# Patient Record
Sex: FEMALE | Race: WHITE | Employment: UNEMPLOYED | URBAN - METROPOLITAN AREA
[De-identification: names, ages, dates, MRNs, and addresses within clinical notes are randomized per-mention and may not be internally consistent; named-entity substitution may affect disease eponyms.]

---

## 2018-10-30 ENCOUNTER — HOSPITAL ENCOUNTER (OUTPATIENT)
Dept: LAB | Age: 63
Discharge: HOME | End: 2018-10-30

## 2018-10-30 LAB — MAGNESIUM SERPL-MCNC: 2.3 MG/DL (ref 1.8–2.4)

## 2018-10-30 PROCEDURE — 83735 ASSAY OF MAGNESIUM: CPT

## 2018-12-03 ENCOUNTER — HOSPITAL ENCOUNTER (OUTPATIENT)
Dept: LAB | Age: 63
Discharge: HOME | End: 2018-12-03

## 2018-12-03 PROCEDURE — 83735 ASSAY OF MAGNESIUM: CPT

## 2018-12-03 PROCEDURE — 84443 ASSAY THYROID STIM HORMONE: CPT

## 2018-12-04 LAB
MAGNESIUM SERPL-MCNC: 2.5 MG/DL (ref 1.8–2.4)
TSH SERPL DL<=0.005 MIU/L-ACNC: 0.74 UIU/ML (ref 0.36–3.74)

## 2019-01-21 ENCOUNTER — HOSPITAL ENCOUNTER (OUTPATIENT)
Dept: LAB | Age: 64
Discharge: HOME | End: 2019-01-21

## 2019-01-21 LAB
BASOPHILS # BLD: 0.1 K/UL (ref 0–0.2)
BASOPHILS NFR BLD: 1 % (ref 0–2)
DIFFERENTIAL METHOD BLD: ABNORMAL
EOSINOPHIL # BLD: 0.5 K/UL (ref 0–0.8)
EOSINOPHIL NFR BLD: 6 % (ref 0.5–7.8)
ERYTHROCYTE [DISTWIDTH] IN BLOOD BY AUTOMATED COUNT: 14.3 % (ref 11.9–14.6)
HCT VFR BLD AUTO: 47.9 % (ref 35.8–46.3)
HGB BLD-MCNC: 14.8 G/DL (ref 11.7–15.4)
IMM GRANULOCYTES # BLD AUTO: 0 K/UL (ref 0–0.5)
IMM GRANULOCYTES NFR BLD AUTO: 0 % (ref 0–5)
LYMPHOCYTES # BLD: 2.9 K/UL (ref 0.5–4.6)
LYMPHOCYTES NFR BLD: 31 % (ref 13–44)
MCH RBC QN AUTO: 28.5 PG (ref 26.1–32.9)
MCHC RBC AUTO-ENTMCNC: 30.9 G/DL (ref 31.4–35)
MCV RBC AUTO: 92.3 FL (ref 79.6–97.8)
MONOCYTES # BLD: 0.8 K/UL (ref 0.1–1.3)
MONOCYTES NFR BLD: 8 % (ref 4–12)
NEUTS SEG # BLD: 5.2 K/UL (ref 1.7–8.2)
NEUTS SEG NFR BLD: 55 % (ref 43–78)
NRBC # BLD: 0 K/UL (ref 0–0.2)
PLATELET # BLD AUTO: 245 K/UL (ref 150–450)
PMV BLD AUTO: 9.6 FL (ref 9.4–12.3)
RBC # BLD AUTO: 5.19 M/UL (ref 4.05–5.2)
WBC # BLD AUTO: 9.5 K/UL (ref 4.3–11.1)

## 2019-01-21 PROCEDURE — 85025 COMPLETE CBC W/AUTO DIFF WBC: CPT

## 2019-12-16 ENCOUNTER — HOSPITAL ENCOUNTER (OUTPATIENT)
Dept: LAB | Age: 64
Discharge: HOME | End: 2019-12-16

## 2019-12-16 LAB
BASOPHILS # BLD: 0.1 K/UL (ref 0–0.2)
BASOPHILS NFR BLD: 1 % (ref 0–2)
DIFFERENTIAL METHOD BLD: ABNORMAL
EOSINOPHIL # BLD: 0.5 K/UL (ref 0–0.8)
EOSINOPHIL NFR BLD: 7 % (ref 0.5–7.8)
ERYTHROCYTE [DISTWIDTH] IN BLOOD BY AUTOMATED COUNT: 14.1 % (ref 11.9–14.6)
EST. AVERAGE GLUCOSE BLD GHB EST-MCNC: 120 MG/DL
HBA1C MFR BLD: 5.8 % (ref 4.8–6)
HCT VFR BLD AUTO: 43.1 % (ref 35.8–46.3)
HGB BLD-MCNC: 13.4 G/DL (ref 11.7–15.4)
IMM GRANULOCYTES # BLD AUTO: 0 K/UL (ref 0–0.5)
IMM GRANULOCYTES NFR BLD AUTO: 0 % (ref 0–5)
LYMPHOCYTES # BLD: 2.7 K/UL (ref 0.5–4.6)
LYMPHOCYTES NFR BLD: 36 % (ref 13–44)
MCH RBC QN AUTO: 28 PG (ref 26.1–32.9)
MCHC RBC AUTO-ENTMCNC: 31.1 G/DL (ref 31.4–35)
MCV RBC AUTO: 90 FL (ref 79.6–97.8)
MONOCYTES # BLD: 0.5 K/UL (ref 0.1–1.3)
MONOCYTES NFR BLD: 6 % (ref 4–12)
NEUTS SEG # BLD: 3.7 K/UL (ref 1.7–8.2)
NEUTS SEG NFR BLD: 50 % (ref 43–78)
NRBC # BLD: 0 K/UL (ref 0–0.2)
PLATELET # BLD AUTO: 193 K/UL (ref 150–450)
PMV BLD AUTO: 10.3 FL (ref 9.4–12.3)
RBC # BLD AUTO: 4.79 M/UL (ref 4.05–5.2)
WBC # BLD AUTO: 7.5 K/UL (ref 4.3–11.1)

## 2019-12-16 PROCEDURE — 85025 COMPLETE CBC W/AUTO DIFF WBC: CPT

## 2019-12-16 PROCEDURE — 83036 HEMOGLOBIN GLYCOSYLATED A1C: CPT

## 2022-06-03 ENCOUNTER — APPOINTMENT (OUTPATIENT)
Dept: GENERAL RADIOLOGY | Age: 67
DRG: 689 | End: 2022-06-03
Payer: MEDICARE

## 2022-06-03 ENCOUNTER — HOSPITAL ENCOUNTER (INPATIENT)
Age: 67
LOS: 11 days | Discharge: NURSING, REHAB OR LONG-TERM CARE | DRG: 689 | End: 2022-06-14
Attending: EMERGENCY MEDICINE
Payer: MEDICARE

## 2022-06-03 ENCOUNTER — APPOINTMENT (OUTPATIENT)
Dept: CT IMAGING | Age: 67
DRG: 689 | End: 2022-06-03
Payer: MEDICARE

## 2022-06-03 DIAGNOSIS — R50.9 ACUTE FEBRILE ILLNESS: ICD-10-CM

## 2022-06-03 DIAGNOSIS — R41.0 DELIRIUM: Primary | ICD-10-CM

## 2022-06-03 PROBLEM — N39.0 UTI (URINARY TRACT INFECTION): Status: ACTIVE | Noted: 2022-01-01

## 2022-06-03 PROBLEM — G93.41 ACUTE METABOLIC ENCEPHALOPATHY: Status: ACTIVE | Noted: 2022-06-03

## 2022-06-03 LAB
ALBUMIN SERPL-MCNC: 3 G/DL (ref 3.2–4.6)
ALBUMIN/GLOB SERPL: 0.8 {RATIO} (ref 1.2–3.5)
ALP SERPL-CCNC: 113 U/L (ref 50–136)
ALT SERPL-CCNC: 8 U/L (ref 12–65)
AMORPH CRY URNS QL MICRO: ABNORMAL
ANION GAP SERPL CALC-SCNC: 10 MMOL/L (ref 7–16)
APPEARANCE FLD: CLEAR
APPEARANCE UR: ABNORMAL
AST SERPL-CCNC: 14 U/L (ref 15–37)
B PERT DNA SPEC QL NAA+PROBE: NOT DETECTED
BACTERIA URNS QL MICRO: ABNORMAL /HPF
BASOPHILS # BLD: 0 K/UL (ref 0–0.2)
BASOPHILS NFR BLD: 1 % (ref 0–2)
BILIRUB SERPL-MCNC: 0.5 MG/DL (ref 0.2–1.1)
BILIRUB UR QL: NEGATIVE
BORDETELLA PARAPERTUSSIS BY PCR: NOT DETECTED
BUN SERPL-MCNC: 7 MG/DL (ref 8–23)
C PNEUM DNA SPEC QL NAA+PROBE: NOT DETECTED
CALCIUM SERPL-MCNC: 8.7 MG/DL (ref 8.3–10.4)
CASTS URNS QL MICRO: 0 /LPF
CHLORIDE SERPL-SCNC: 104 MMOL/L (ref 98–107)
CO2 SERPL-SCNC: 24 MMOL/L (ref 21–32)
COLOR FLD: COLORLESS
COLOR UR: YELLOW
CREAT SERPL-MCNC: 0.62 MG/DL (ref 0.6–1)
CRYSTALS URNS QL MICRO: 0 /LPF
DIFFERENTIAL METHOD BLD: ABNORMAL
EKG ATRIAL RATE: 115 BPM
EKG DIAGNOSIS: NORMAL
EKG P AXIS: 59 DEGREES
EKG P-R INTERVAL: 152 MS
EKG Q-T INTERVAL: 324 MS
EKG QRS DURATION: 69 MS
EKG QTC CALCULATION (BAZETT): 449 MS
EKG R AXIS: -20 DEGREES
EKG T AXIS: 47 DEGREES
EKG VENTRICULAR RATE: 115 BPM
EOSINOPHIL # BLD: 0.1 K/UL (ref 0–0.8)
EOSINOPHIL NFR BLD: 1 % (ref 0.5–7.8)
EPI CELLS #/AREA URNS HPF: 0 /HPF
ERYTHROCYTE [DISTWIDTH] IN BLOOD BY AUTOMATED COUNT: 14 % (ref 11.9–14.6)
FLUAV SUBTYP SPEC NAA+PROBE: NOT DETECTED
FLUBV RNA SPEC QL NAA+PROBE: NOT DETECTED
GLOBULIN SER CALC-MCNC: 3.6 G/DL (ref 2.3–3.5)
GLUCOSE BLD STRIP.AUTO-MCNC: 148 MG/DL (ref 65–100)
GLUCOSE CSF-MCNC: 67 MG/DL (ref 40–70)
GLUCOSE SERPL-MCNC: 125 MG/DL (ref 65–100)
GLUCOSE UR STRIP.AUTO-MCNC: NEGATIVE MG/DL
HADV DNA SPEC QL NAA+PROBE: NOT DETECTED
HCOV 229E RNA SPEC QL NAA+PROBE: NOT DETECTED
HCOV HKU1 RNA SPEC QL NAA+PROBE: NOT DETECTED
HCOV NL63 RNA SPEC QL NAA+PROBE: NOT DETECTED
HCOV OC43 RNA SPEC QL NAA+PROBE: NOT DETECTED
HCT VFR BLD AUTO: 40 % (ref 35.8–46.3)
HGB BLD-MCNC: 13 G/DL (ref 11.7–15.4)
HGB UR QL STRIP: ABNORMAL
HMPV RNA SPEC QL NAA+PROBE: NOT DETECTED
HPIV1 RNA SPEC QL NAA+PROBE: NOT DETECTED
HPIV2 RNA SPEC QL NAA+PROBE: NOT DETECTED
HPIV3 RNA SPEC QL NAA+PROBE: NOT DETECTED
HPIV4 RNA SPEC QL NAA+PROBE: NOT DETECTED
IMM GRANULOCYTES # BLD AUTO: 0 K/UL (ref 0–0.5)
IMM GRANULOCYTES NFR BLD AUTO: 1 % (ref 0–5)
KETONES UR QL STRIP.AUTO: ABNORMAL MG/DL
LACTATE SERPL-SCNC: 1.4 MMOL/L (ref 0.4–2)
LEUKOCYTE ESTERASE UR QL STRIP.AUTO: NEGATIVE
LYMPHOCYTES # BLD: 0.3 K/UL (ref 0.5–4.6)
LYMPHOCYTES NFR BLD: 6 % (ref 13–44)
M PNEUMO DNA SPEC QL NAA+PROBE: NOT DETECTED
MAGNESIUM SERPL-MCNC: 2.4 MG/DL (ref 1.8–2.4)
MCH RBC QN AUTO: 28 PG (ref 26.1–32.9)
MCHC RBC AUTO-ENTMCNC: 32.5 G/DL (ref 31.4–35)
MCV RBC AUTO: 86.2 FL (ref 79.6–97.8)
MONOCYTES # BLD: 0.2 K/UL (ref 0.1–1.3)
MONOCYTES NFR BLD: 5 % (ref 4–12)
MUCOUS THREADS URNS QL MICRO: 0 /LPF
NEUTS SEG # BLD: 4.2 K/UL (ref 1.7–8.2)
NEUTS SEG NFR BLD: 86 % (ref 43–78)
NITRITE UR QL STRIP.AUTO: POSITIVE
NRBC # BLD: 0 K/UL (ref 0–0.2)
NUC CELL # FLD: 2 /CU MM
PH UR STRIP: 7.5 [PH] (ref 5–9)
PLATELET # BLD AUTO: 141 K/UL (ref 150–450)
PMV BLD AUTO: 8.9 FL (ref 9.4–12.3)
POTASSIUM SERPL-SCNC: 3.3 MMOL/L (ref 3.5–5.1)
PROCALCITONIN SERPL-MCNC: 0.1 NG/ML (ref 0–0.49)
PROT CSF-MCNC: 80 MG/DL (ref 15–45)
PROT SERPL-MCNC: 6.6 G/DL (ref 6.3–8.2)
PROT UR STRIP-MCNC: 30 MG/DL
RBC # BLD AUTO: 4.64 M/UL (ref 4.05–5.2)
RBC # FLD: 3 /CU MM
RBC #/AREA URNS HPF: ABNORMAL /HPF
RSV RNA SPEC QL NAA+PROBE: NOT DETECTED
RV+EV RNA SPEC QL NAA+PROBE: NOT DETECTED
SARS-COV-2 RNA RESP QL NAA+PROBE: NOT DETECTED
SERVICE CMNT-IMP: ABNORMAL
SODIUM SERPL-SCNC: 138 MMOL/L (ref 136–145)
SP GR UR REFRACTOMETRY: 1.02 (ref 1–1.02)
SPECIMEN SOURCE FLD: NORMAL
T4 SERPL-MCNC: 9.4 UG/DL (ref 4.8–13.9)
TSH W FREE THYROID IF ABNORMAL: 0.87 UIU/ML (ref 0.36–3.74)
TSH, 3RD GENERATION: 0.91 UIU/ML (ref 0.36–3.74)
TUBE # CSF: 1
TUBE # CSF: 1
UROBILINOGEN UR QL STRIP.AUTO: 0.2 EU/DL (ref 0.2–1)
WBC # BLD AUTO: 4.9 K/UL (ref 4.3–11.1)
WBC URNS QL MICRO: ABNORMAL /HPF

## 2022-06-03 PROCEDURE — 99285 EMERGENCY DEPT VISIT HI MDM: CPT

## 2022-06-03 PROCEDURE — 87186 SC STD MICRODIL/AGAR DIL: CPT

## 2022-06-03 PROCEDURE — 84443 ASSAY THYROID STIM HORMONE: CPT

## 2022-06-03 PROCEDURE — 81015 MICROSCOPIC EXAM OF URINE: CPT

## 2022-06-03 PROCEDURE — 009U3ZX DRAINAGE OF SPINAL CANAL, PERCUTANEOUS APPROACH, DIAGNOSTIC: ICD-10-PCS | Performed by: INTERNAL MEDICINE

## 2022-06-03 PROCEDURE — 83735 ASSAY OF MAGNESIUM: CPT

## 2022-06-03 PROCEDURE — 85025 COMPLETE CBC W/AUTO DIFF WBC: CPT

## 2022-06-03 PROCEDURE — 70450 CT HEAD/BRAIN W/O DYE: CPT

## 2022-06-03 PROCEDURE — 6360000002 HC RX W HCPCS: Performed by: EMERGENCY MEDICINE

## 2022-06-03 PROCEDURE — 1100000000 HC RM PRIVATE

## 2022-06-03 PROCEDURE — 87205 SMEAR GRAM STAIN: CPT

## 2022-06-03 PROCEDURE — 71045 X-RAY EXAM CHEST 1 VIEW: CPT

## 2022-06-03 PROCEDURE — 82962 GLUCOSE BLOOD TEST: CPT

## 2022-06-03 PROCEDURE — 2580000003 HC RX 258: Performed by: FAMILY MEDICINE

## 2022-06-03 PROCEDURE — 87088 URINE BACTERIA CULTURE: CPT

## 2022-06-03 PROCEDURE — 6360000002 HC RX W HCPCS: Performed by: FAMILY MEDICINE

## 2022-06-03 PROCEDURE — 84436 ASSAY OF TOTAL THYROXINE: CPT

## 2022-06-03 PROCEDURE — 87040 BLOOD CULTURE FOR BACTERIA: CPT

## 2022-06-03 PROCEDURE — 96361 HYDRATE IV INFUSION ADD-ON: CPT

## 2022-06-03 PROCEDURE — 83605 ASSAY OF LACTIC ACID: CPT

## 2022-06-03 PROCEDURE — 87086 URINE CULTURE/COLONY COUNT: CPT

## 2022-06-03 PROCEDURE — 80053 COMPREHEN METABOLIC PANEL: CPT

## 2022-06-03 PROCEDURE — 82945 GLUCOSE OTHER FLUID: CPT

## 2022-06-03 PROCEDURE — 96365 THER/PROPH/DIAG IV INF INIT: CPT

## 2022-06-03 PROCEDURE — 81003 URINALYSIS AUTO W/O SCOPE: CPT

## 2022-06-03 PROCEDURE — 84145 PROCALCITONIN (PCT): CPT

## 2022-06-03 PROCEDURE — 0202U NFCT DS 22 TRGT SARS-COV-2: CPT

## 2022-06-03 PROCEDURE — 84157 ASSAY OF PROTEIN OTHER: CPT

## 2022-06-03 PROCEDURE — 2580000003 HC RX 258: Performed by: EMERGENCY MEDICINE

## 2022-06-03 PROCEDURE — 89050 BODY FLUID CELL COUNT: CPT

## 2022-06-03 PROCEDURE — 62270 DX LMBR SPI PNXR: CPT

## 2022-06-03 PROCEDURE — 93005 ELECTROCARDIOGRAM TRACING: CPT | Performed by: EMERGENCY MEDICINE

## 2022-06-03 RX ORDER — CARBOXYMETHYLCELLULOSE SODIUM 10 MG/ML
1 GEL OPHTHALMIC EVERY 12 HOURS
Status: DISCONTINUED | OUTPATIENT
Start: 2022-06-03 | End: 2022-06-14 | Stop reason: HOSPADM

## 2022-06-03 RX ORDER — DULOXETIN HYDROCHLORIDE 30 MG/1
120 CAPSULE, DELAYED RELEASE ORAL DAILY
Status: DISCONTINUED | OUTPATIENT
Start: 2022-06-03 | End: 2022-06-14 | Stop reason: HOSPADM

## 2022-06-03 RX ORDER — ACETAMINOPHEN 650 MG/1
650 SUPPOSITORY RECTAL EVERY 6 HOURS PRN
Status: DISCONTINUED | OUTPATIENT
Start: 2022-06-03 | End: 2022-06-14 | Stop reason: HOSPADM

## 2022-06-03 RX ORDER — POLYETHYLENE GLYCOL 3350 17 G/17G
17 POWDER, FOR SOLUTION ORAL DAILY PRN
Status: DISCONTINUED | OUTPATIENT
Start: 2022-06-03 | End: 2022-06-14 | Stop reason: HOSPADM

## 2022-06-03 RX ORDER — SODIUM CHLORIDE, SODIUM LACTATE, POTASSIUM CHLORIDE, AND CALCIUM CHLORIDE .6; .31; .03; .02 G/100ML; G/100ML; G/100ML; G/100ML
30 INJECTION, SOLUTION INTRAVENOUS ONCE
Status: COMPLETED | OUTPATIENT
Start: 2022-06-03 | End: 2022-06-03

## 2022-06-03 RX ORDER — LEVOTHYROXINE SODIUM 0.05 MG/1
25 TABLET ORAL DAILY
Status: DISCONTINUED | OUTPATIENT
Start: 2022-06-03 | End: 2022-06-14 | Stop reason: HOSPADM

## 2022-06-03 RX ORDER — PANTOPRAZOLE SODIUM 40 MG/1
40 TABLET, DELAYED RELEASE ORAL
Status: DISCONTINUED | OUTPATIENT
Start: 2022-06-04 | End: 2022-06-03

## 2022-06-03 RX ORDER — INSULIN LISPRO 100 [IU]/ML
0-8 INJECTION, SOLUTION INTRAVENOUS; SUBCUTANEOUS
Status: DISCONTINUED | OUTPATIENT
Start: 2022-06-03 | End: 2022-06-04

## 2022-06-03 RX ORDER — TRAZODONE HYDROCHLORIDE 50 MG/1
25 TABLET ORAL NIGHTLY
Status: DISCONTINUED | OUTPATIENT
Start: 2022-06-03 | End: 2022-06-14 | Stop reason: HOSPADM

## 2022-06-03 RX ORDER — BUTALBITAL, ACETAMINOPHEN AND CAFFEINE 50; 325; 40 MG/1; MG/1; MG/1
1 TABLET ORAL EVERY 4 HOURS PRN
Status: DISCONTINUED | OUTPATIENT
Start: 2022-06-03 | End: 2022-06-14 | Stop reason: HOSPADM

## 2022-06-03 RX ORDER — ARIPIPRAZOLE 5 MG/1
15 TABLET ORAL DAILY
Status: DISCONTINUED | OUTPATIENT
Start: 2022-06-03 | End: 2022-06-14 | Stop reason: HOSPADM

## 2022-06-03 RX ORDER — SODIUM CHLORIDE 0.9 % (FLUSH) 0.9 %
3 SYRINGE (ML) INJECTION EVERY 8 HOURS
Status: DISCONTINUED | OUTPATIENT
Start: 2022-06-03 | End: 2022-06-14

## 2022-06-03 RX ORDER — SODIUM CHLORIDE 9 MG/ML
INJECTION, SOLUTION INTRAVENOUS PRN
Status: DISCONTINUED | OUTPATIENT
Start: 2022-06-03 | End: 2022-06-14 | Stop reason: HOSPADM

## 2022-06-03 RX ORDER — ACETAMINOPHEN 325 MG/1
650 TABLET ORAL EVERY 6 HOURS PRN
Status: DISCONTINUED | OUTPATIENT
Start: 2022-06-03 | End: 2022-06-14 | Stop reason: HOSPADM

## 2022-06-03 RX ORDER — HYDRALAZINE HYDROCHLORIDE 20 MG/ML
20 INJECTION INTRAMUSCULAR; INTRAVENOUS EVERY 6 HOURS PRN
Status: DISCONTINUED | OUTPATIENT
Start: 2022-06-03 | End: 2022-06-14 | Stop reason: HOSPADM

## 2022-06-03 RX ORDER — KETOROLAC TROMETHAMINE 10 MG/1
10 TABLET, FILM COATED ORAL EVERY 6 HOURS PRN
Status: DISPENSED | OUTPATIENT
Start: 2022-06-03 | End: 2022-06-08

## 2022-06-03 RX ORDER — SODIUM CHLORIDE 0.9 % (FLUSH) 0.9 %
5-40 SYRINGE (ML) INJECTION PRN
Status: DISCONTINUED | OUTPATIENT
Start: 2022-06-03 | End: 2022-06-14 | Stop reason: HOSPADM

## 2022-06-03 RX ORDER — INSULIN LISPRO 100 [IU]/ML
0-4 INJECTION, SOLUTION INTRAVENOUS; SUBCUTANEOUS NIGHTLY
Status: DISCONTINUED | OUTPATIENT
Start: 2022-06-03 | End: 2022-06-04 | Stop reason: SDUPTHER

## 2022-06-03 RX ORDER — DIVALPROEX SODIUM 125 MG/1
250 CAPSULE, COATED PELLETS ORAL EVERY 8 HOURS SCHEDULED
Status: DISCONTINUED | OUTPATIENT
Start: 2022-06-03 | End: 2022-06-14 | Stop reason: HOSPADM

## 2022-06-03 RX ORDER — TOPIRAMATE 50 MG/1
50 TABLET, FILM COATED ORAL DAILY
Status: DISCONTINUED | OUTPATIENT
Start: 2022-06-03 | End: 2022-06-14 | Stop reason: HOSPADM

## 2022-06-03 RX ORDER — POTASSIUM CHLORIDE 20 MEQ/1
40 TABLET, EXTENDED RELEASE ORAL ONCE
Status: COMPLETED | OUTPATIENT
Start: 2022-06-03 | End: 2022-06-06

## 2022-06-03 RX ORDER — HEPARIN SODIUM 5000 [USP'U]/ML
5000 INJECTION, SOLUTION INTRAVENOUS; SUBCUTANEOUS EVERY 8 HOURS SCHEDULED
Status: DISCONTINUED | OUTPATIENT
Start: 2022-06-03 | End: 2022-06-14 | Stop reason: HOSPADM

## 2022-06-03 RX ORDER — SODIUM CHLORIDE 0.9 % (FLUSH) 0.9 %
5-40 SYRINGE (ML) INJECTION EVERY 12 HOURS SCHEDULED
Status: DISCONTINUED | OUTPATIENT
Start: 2022-06-03 | End: 2022-06-14 | Stop reason: HOSPADM

## 2022-06-03 RX ADMIN — CEFTRIAXONE 1000 MG: 1 INJECTION, POWDER, FOR SOLUTION INTRAMUSCULAR; INTRAVENOUS at 22:42

## 2022-06-03 RX ADMIN — PIPERACILLIN AND TAZOBACTAM 4500 MG: 4; .5 INJECTION, POWDER, LYOPHILIZED, FOR SOLUTION INTRAVENOUS at 10:30

## 2022-06-03 RX ADMIN — SODIUM CHLORIDE, PRESERVATIVE FREE 10 ML: 5 INJECTION INTRAVENOUS at 22:28

## 2022-06-03 RX ADMIN — SODIUM CHLORIDE, POTASSIUM CHLORIDE, SODIUM LACTATE AND CALCIUM CHLORIDE 1641 ML: 600; 310; 30; 20 INJECTION, SOLUTION INTRAVENOUS at 11:14

## 2022-06-03 RX ADMIN — HEPARIN SODIUM 5000 UNITS: 5000 INJECTION INTRAVENOUS; SUBCUTANEOUS at 22:47

## 2022-06-03 ASSESSMENT — ENCOUNTER SYMPTOMS: VOMITING: 0

## 2022-06-03 NOTE — PROGRESS NOTES
TRANSFER - IN REPORT:    Verbal report received from ALICIA Whyte on Angel Vivas  being received from ER for routine progression of patient care      Report consisted of patient's Situation, Background, Assessment and   Recommendations(SBAR). Information from the following report(s) Nurse Handoff Report was reviewed with the receiving nurse. Opportunity for questions and clarification was provided. Assessment completed upon patient's arrival to unit and care assumed.

## 2022-06-03 NOTE — ED TRIAGE NOTES
Pt arrives via [facility] from [facility]. Per EMS facility reports pt is altered. At baseline facility reports pt is conversational and able to move all extremities. On arrival pt is nonverbal. Last time facility reports pt was normal was Wednesday.

## 2022-06-03 NOTE — H&P
Hospitalist Admission History and Physical         NAME:            Zeny Guerrero    Age:                61 y.o.    :               1955    MRN:              [id number]    PCP: Chiquis Contreras MD    Consulting MD:    Treatment Team: Attending Provider: Nidia Ash DO; Registered Nurse: Shanika Zamora RN         Chief Complaint   Patient presents with    Altered Mental Status   HPI:    Patient is a 61 y.o. female who presented from nursing facility for cc AMS. History cannot be obtained by the patient but sister is by bedside who states her sisters AMS started this AM by not talking. Usually able to speak without issues. Hx of CVA with residual left sided weakness, bedbound, no teeth but able to eat regular food, CAD, DM type II, PE no longer on anticoagulation, anxiety, hypothyroidism, chronic pain, and parkinsons. Vitals - , BP elevated. Labs- K 3.3. Procal 0.1. Viral panel negative. UA with positive nitrites and 4+ bacteria. WBC 0-3    CT head and chest x ray with no significant acute findings.   Past Medical History:   Diagnosis Date    Arthritis     Back, knees    CAD (coronary artery disease)     Chronic kidney disease     2010 ARF-- no dialysis--  r/t sepsis per pt    Chronic pain     Coagulation defects     Diabetes ([address] x 6 yrs     type2- sqbs avg am- 125-130---- hypo at [de-identified] -80's    GERD (gastroesophageal reflux disease)     controlled with meds    Hypertension x 5 + yrs    controlled with meds    Morbid obesity ([address]     bmi=47    PE [facility]ol[facility]) [facility] ,     Psychiatric disorder     anxiety depression    Stroke [facility]) 2 yrs ago    left sided weakness    Thromboembolus ([address]     Thyroid disease     Unspecified sleep apnea     wears cpap-\" most of time \" per pt-- wears O2 at hs \" sometimes\" per pt            Past Surgical History:   Procedure Laterality Date    ADENOIDECTOMY  as child   [address], [address] [facility] liothyronine (CYTOMEL) 25 MCG tablet Take 25 mcg by mouth daily    Ar Automatic Reconciliation   LORazepam (ATIVAN) 2 MG tablet Take 2 mg by mouth 2 times daily. Ar Automatic Reconciliation   nitroGLYCERIN (NITROSTAT) 0.4 MG SL tablet Place 0.4 mg under the tongue    Ar Automatic Reconciliation   oxyCODONE (OXY-IR) 15 MG immediate release tablet Take 15 mg by mouth every 8 hours as needed. Ar Automatic Reconciliation   pregabalin (LYRICA) 75 MG capsule Take by mouth. Ar Automatic Reconciliation   rosuvastatin (CRESTOR) 40 MG tablet Take 20 mg by mouth    Ar Automatic Reconciliation   tolterodine (DETROL LA) 4 MG extended release capsule Take 4 mg by mouth daily    Ar Automatic Reconciliation   traMADol (ULTRAM) 50 MG tablet Take 50 mg by mouth every 6 hours as needed. Ar Automatic Reconciliation   zolpidem (AMBIEN) 10 MG tablet Take by mouth. Ar Automatic Reconciliation                      Review of Systems    Cannot obtain due to AMS      Objective:         Patient Vitals for the past 24 hrs:   Temp Pulse Resp BP SpO2   06/03/22 1116 -- (!) 111 19 (!) 171/112 97 %   06/03/22 1056 -- (!) 114 20 (!) 162/110 96 %   06/03/22 1053 100.3 °F (37.9 °C) (!) 113 20 -- 95 %   06/03/22 1023 -- (!) 116 24 (!) 152/81 92 %            No intake/output data recorded. No intake/output data recorded.          Data Review:   Recent Results (from the past 24 hour(s))   CBC with Auto Differential    Collection Time: 06/03/22 10:37 AM   Result Value Ref Range    WBC 4.9 4.3 - 11.1 K/uL    RBC 4.64 4.05 - 5.2 M/uL    Hemoglobin 13.0 11.7 - 15.4 g/dL    Hematocrit 40.0 35.8 - 46.3 %    MCV 86.2 79.6 - 97.8 FL    MCH 28.0 26.1 - 32.9 PG    MCHC 32.5 31.4 - 35.0 g/dL    RDW 14.0 11.9 - 14.6 %    Platelets 517 (L) 819 - 450 K/uL    MPV 8.9 (L) 9.4 - 12.3 FL    nRBC 0.00 0.0 - 0.2 K/uL    Differential Type AUTOMATED      Seg Neutrophils 86 (H) 43 - 78 %    Lymphocytes 6 (L) 13 - 44 %    Monocytes 5 4.0 - 12.0 % Eosinophils % 1 0.5 - 7.8 %    Basophils 1 0.0 - 2.0 %    Immature Granulocytes 1 0.0 - 5.0 %    Segs Absolute 4.2 1.7 - 8.2 K/UL    Absolute Lymph # 0.3 (L) 0.5 - 4.6 K/UL    Absolute Mono # 0.2 0.1 - 1.3 K/UL    Absolute Eos # 0.1 0.0 - 0.8 K/UL    Basophils Absolute 0.0 0.0 - 0.2 K/UL    Absolute Immature Granulocyte 0.0 0.0 - 0.5 K/UL   Lactic Acid Now and in 2 Hours    Collection Time: 06/03/22 10:37 AM   Result Value Ref Range    Lactic Acid, Plasma 1.4 0.4 - 2.0 MMOL/L   Procalcitonin    Collection Time: 06/03/22 10:37 AM   Result Value Ref Range    Procalcitonin 0.10 0.00 - 0.49 ng/mL   EKG 12 Lead    Collection Time: 06/03/22 10:43 AM   Result Value Ref Range    Ventricular Rate 115 BPM    Atrial Rate 115 BPM    P-R Interval 152 ms    QRS Duration 69 ms    Q-T Interval 324 ms    QTc Calculation (Bazett) 449 ms    P Axis 59 degrees    R Axis -20 degrees    T Axis 47 degrees    Diagnosis Sinus tachycardia    Urinalysis w rflx microscopic    Collection Time: 06/03/22 10:52 AM   Result Value Ref Range    Color, UA YELLOW      Appearance HAZY      Specific [place], UA 1.020 1.001 - 1.023      pH, Urine 7.5 5.0 - 9.0      Protein, UA 30 (A) NEG mg/dL    Glucose, UA Negative mg/dL    Ketones, Urine TRACE (A) NEG mg/dL    Bilirubin Urine Negative NEG      Blood, Urine TRACE (A) NEG      Urobilinogen, Urine 0.2 0.2 - 1.0 EU/dL    Nitrite, Urine Positive (A) NEG      Leukocyte Esterase, Urine Negative NEG     Urinalysis, Micro    Collection Time: 06/03/22 10:52 AM   Result Value Ref Range    WBC, UA 0-3 0 /hpf    RBC, UA 3-5 0 /hpf    Epithelial Cells UA 0 0 /hpf    BACTERIA, URINE 4+ (H) 0 /hpf    Casts 0 0 /lpf    Crystals 0 0 /LPF    AMORPHOUS CRYSTAL TRACE 0      Mucus, UA 0 0 /lpf   Respiratory Panel, Molecular, with COVID-19 (Restricted: peds pts or suitable admitted adults)    Collection Time: 06/03/22 10:56 AM    Specimen: Nasopharyngeal   Result Value Ref Range    Adenovirus by PCR NOT DETECTED Coronavirus 229E by PCR NOT DETECTED      Coronavirus HKU1 by PCR NOT DETECTED      Coronavirus NL63 by PCR NOT DETECTED      Coronavirus OC43 by PCR NOT DETECTED      SARS-CoV-2, PCR NOT DETECTED      Human Metapneumovirus by PCR NOT DETECTED      Rhinovirus Enterovirus PCR NOT DETECTED      Influenza A by PCR NOT DETECTED      INFLUENZA B PCR NOT DETECTED      Parainfluenza 1 PCR NOT DETECTED      Parainfluenza 2 PCR NOT DETECTED      Parainfluenza 3 PCR NOT DETECTED      Parainfluenza 4 PCR NOT DETECTED      Respiratory Syncytial Virus by PCR NOT DETECTED      Bordetella parapertussis by PCR NOT DETECTED      Bordetella pertussis by PCR NOT DETECTED      Chlamydophila Pneumonia PCR NOT DETECTED      Mycoplasma pneumo by PCR NOT DETECTED     Comprehensive Metabolic Panel    Collection Time: 06/03/22 12:44 PM   Result Value Ref Range    Sodium 138 136 - 145 mmol/L    Potassium 3.3 (L) 3.5 - 5.1 mmol/L    Chloride 104 98 - 107 mmol/L    CO2 24 21 - 32 mmol/L    Anion Gap 10 7 - 16 mmol/L    Glucose 125 (H) 65 - 100 mg/dL    BUN 7 (L) 8 - 23 MG/DL    CREATININE 0.62 0.6 - 1.0 MG/DL    GFR African American >60 >60 ml/min/1.73m2    GFR Non- >60 >60 ml/min/1.73m2    Calcium 8.7 8.3 - 10.4 MG/DL    Total Bilirubin 0.5 0.2 - 1.1 MG/DL    ALT 8 (L) 12 - 65 U/L    AST 14 (L) 15 - 37 U/L    Alk Phosphatase 113 50 - 136 U/L    Total Protein 6.6 6.3 - 8.2 g/dL    Albumin 3.0 (L) 3.2 - 4.6 g/dL    Globulin 3.6 (H) 2.3 - 3.5 g/dL    Albumin/Globulin Ratio 0.8 (L) 1.2 - 3.5     TSH with Reflex    Collection Time: 06/03/22 12:44 PM   Result Value Ref Range    TSH w Free Thyroid if Abnormal 0.87 0.358 - 3.740 UIU/ML            Physical Exam:         General:    Alert, frail appearing, not speaking. Pale    Eyes:    Conjunctivae/corneas clear. Ears:    Normal     Nose:    Nares normal.     Mouth/Throat:    No obvious deficit    Neck:     no JVD.     Back:     deferred    Lungs:     Clear to auscultation bilaterally. Heart:    Sinus tachycardia    Abdomen:     Soft, non-tender. Bowel sounds normal. No masses,  No organomegaly. Extremities:    Chronic 2+ edema to LE bilaterally     Skin:    Skin color, texture, turgor normal. No rashes or lesions    Neurologic:    Not speaking. Very limited ROM in bed. Assessment and Plan         Principal Problem:    Acute metabolic encephalopathy  Active Problems:    UTI (urinary tract infection)    Diabetes mellitus (HCC)    RYAN (obstructive sleep apnea)    Hypokalemia    Stroke ([facility]    HTN (hypertension)  Resolved Problems:    * No resolved hospital problems. *    Metabolic encephalopathy - Likely due to suspected UTI. Order urine culture and blood cultures pending. ER to perform LP but my suspicion is low since she is moving her neck in room without obvious pain. Recheck TSH/T4, ammonia. Ceftriaxone for now. Was given Zosyn in the ER. Holding gabapentin and Zanaflex. Parkinson d/o- Sinemet TID    Hypothyroidism - Recheck TSH/T4, snythroid    Depression - abilify, depakote, trazodone    Chronic headaches - Topamax. DM type II - SS    HTN- does not appear to take any medications for BP control. PRN hydralazine. RYAN- does not wear CPAP     No nursing home papers by bedside. I have reviewed a note from April of this year to obtain her home medications. FULL CODE until we have papers from nursing home. Sister states the patient makes her own medical decisions.      DVT prophylaxis - Heparin    Signed By:    Chikis Serna DO    Meagan 3, 2022

## 2022-06-03 NOTE — ED PROVIDER NOTES
visualization of images, tracings, or specimens: yes    Risk of Complications, Morbidity, and/or Mortality  Presenting problems: moderate  Diagnostic procedures: low  Management options: theresa Connolly is a 61 y.o. female who presents to the Emergency Department with chief complaint of    Chief Complaint   Patient presents with    Altered Mental Status      82-year-old female brought in by EMS from [facility]-care facility. Patient is lethargic cannot give any history whatsoever. Medics state patient has had increasing lethargy and decreased responsiveness. They state and they asked repeatedly when last known well was and they were told 48 hours ago. No known trauma. No fever no vomiting. No MAR with the patient. There is some documentation denies a history of hypertension and diabetes along with coronary artery disease. Mention of a stroke in the past with residual left-sided weakness. Also history of pulmonary embolism. Unknown if patient is on anticoagulants. Also history of thyroid disease. Medics thought patient's left arm is somewhat weaker than the right. The history is provided by the EMS personnel and the nursing home. Altered Mental Status  Presenting symptoms: lethargy    Severity:  Moderate  Most recent episode:  2 days ago  Episode history:  Continuous  Duration:  2 days  Timing:  Constant  Progression:  Worsening  Chronicity:  New  Context: dementia and nursing home resident    Associated symptoms: no fever, no seizures and no vomiting        All other systems reviewed and are negative. Review of Systems   Unable to perform ROS: Dementia   Constitutional: Negative for fever. Gastrointestinal: Negative for vomiting. Neurological: Negative for seizures.        Past Medical History:   Diagnosis Date    Arthritis     Back, knees    CAD (coronary artery disease)     Chronic kidney disease     August 2010 ARF-- no dialysis--  r/t sepsis per pt    Chronic pain     Coagulation defects     Diabetes ([facility] x 6 yrs     type2- sqbs avg am- 125-130---- hypo at [de-identified] -80's    GERD (gastroesophageal reflux disease)     controlled with meds    Hypertension x 5 + yrs    controlled with meds    Morbid obesity ([facility]     bmi=47    PE (pulmonary thromboembolism) ([facility] 2007, 2009    Psychiatric disorder     anxiety depression    Stroke [facility]) 2 yrs ago    left sided weakness    Thromboembolus ([facility]     Thyroid disease     Unspecified sleep apnea     wears cpap-\" most of time \" per pt-- wears O2 at hs \" sometimes\" per pt        Past Surgical History:   Procedure Laterality Date    ADENOIDECTOMY  as child    CHOLECYSTECTOMY, LAPAROSCOPIC      1993    HEENT      myringotomy x 2    HYSTERECTOMY  1992    OTHER SURGICAL HISTORY      Odon teeth    TONSILLECTOMY  as teen        Family History   Problem Relation Age of Onset    Liver Disease Father     Kidney Disease Father     Cancer Mother            Social Connections:     Frequency of Communication with Friends and Family: Not on file    Frequency of Social Gatherings with Friends and Family: Not on file    Attends Islam Services: Not on file    Active Member of [address] [place] [address] [employer] or Organizations: Not on file    Attends Club or Organization Meetings: Not on file    Marital Status: Not on file        Allergies   Allergen Reactions    Acetaminophen Other (See Comments)    Carisoprodol Other (See Comments)    Cephalexin Nausea Only    Cyclobenzaprine Other (See Comments)    Erythromycin Nausea Only    Gabapentin Other (See Comments)    Hydrocodone-Acetaminophen Other (See Comments)    Metoclopramide Other (See Comments)    Penicillins Swelling    Povidone-Iodine Other (See Comments)    Risperidone Other (See Comments)    Sulfa Antibiotics Nausea Only    Codeine Rash    Oxycodone-Acetaminophen Rash        Vitals signs and nursing note reviewed. No data found. Physical Exam  Vitals and nursing note reviewed. Constitutional:       General: She is in acute distress. Appearance: She is ill-appearing. Comments: Lethargic   HENT:      Head: Normocephalic and atraumatic. Right Ear: External ear normal.      Left Ear: External ear normal.      Mouth/Throat:      Mouth: Mucous membranes are moist.      Pharynx: Oropharynx is clear. Eyes:      General: No scleral icterus. Extraocular Movements: Extraocular movements intact. Pupils: Pupils are equal, round, and reactive to light. Cardiovascular:      Rate and Rhythm: Normal rate and regular rhythm. Pulmonary:      Effort: Pulmonary effort is normal.      Breath sounds: Normal breath sounds. Abdominal:      Palpations: Abdomen is soft. Tenderness: There is no abdominal tenderness. Musculoskeletal:         General: No swelling or tenderness. Cervical back: Normal range of motion and neck supple. Right lower leg: No edema. Left lower leg: No edema. Skin:     General: Skin is warm and dry. Neurological:      General: No focal deficit present. Mental Status: She is lethargic. GCS: GCS eye subscore is 3. GCS verbal subscore is 4. GCS motor subscore is 6. Comments: Opens eyes to loud verbal.  Will  equally. Withdraws to stimulation of heels. Somewhat poor muscle tone throughout. No obvious focal weakness at this point. Lumbar Puncture    Date/Time: 6/3/2022 4:17 PM  Performed by: Emani Mccray MD  Authorized by: Cheli Boateng DO     Consent:     Consent obtained:  Written    Consent given by:  Healthcare agent    Risks discussed:  Infection and nerve damage    Alternatives discussed:  No treatment  Pre-procedure details:     Procedure purpose:  Diagnostic  Anesthesia (see MAR for exact dosages):      Anesthesia method:  Local infiltration    Local anesthetic:  Lidocaine 1% WITH epi  Procedure details:     Lumbar space:  L4-L5 interspace    Needle gauge:  20    Needle type:  Spinal needle - Quincke tip    Needle length (in):  3.5    Number of attempts:  2    Fluid appearance:  Clear    Tubes of fluid:  4    Total volume (ml):  6  Post-procedure:     Puncture site:  Adhesive bandage applied    Patient tolerance of procedure: Tolerated well, no immediate complications  Comments:      No specimens obtained. No blood loss. Bedside timeout performed.         Labs Reviewed   CULTURE, BLOOD 1   CULTURE, BLOOD 1   COVID-19, FLU A/B, AND RSV COMBO   CULTURE, URINE   CBC WITH AUTO DIFFERENTIAL   COMPREHENSIVE METABOLIC PANEL   TSH WITH REFLEX   LACTIC ACID   LACTIC ACID   PROCALCITONIN   URINALYSIS   POCT GLUCOSE        XR CHEST PORTABLE    (Results Pending)            Emerson Coma Scale  Eye Opening: Spontaneous  Best Verbal Response: None  Best Motor Response: Obeys commands  Emerson Coma Scale Score: 11           Results Include:    Recent Results (from the past 24 hour(s))   CBC with Auto Differential    Collection Time: 06/03/22 10:37 AM   Result Value Ref Range    WBC 4.9 4.3 - 11.1 K/uL    RBC 4.64 4.05 - 5.2 M/uL    Hemoglobin 13.0 11.7 - 15.4 g/dL    Hematocrit 40.0 35.8 - 46.3 %    MCV 86.2 79.6 - 97.8 FL    MCH 28.0 26.1 - 32.9 PG    MCHC 32.5 31.4 - 35.0 g/dL    RDW 14.0 11.9 - 14.6 %    Platelets 604 (L) 019 - 450 K/uL    MPV 8.9 (L) 9.4 - 12.3 FL    nRBC 0.00 0.0 - 0.2 K/uL    Differential Type AUTOMATED      Seg Neutrophils 86 (H) 43 - 78 %    Lymphocytes 6 (L) 13 - 44 %    Monocytes 5 4.0 - 12.0 %    Eosinophils % 1 0.5 - 7.8 %    Basophils 1 0.0 - 2.0 %    Immature Granulocytes 1 0.0 - 5.0 %    Segs Absolute 4.2 1.7 - 8.2 K/UL    Absolute Lymph # 0.3 (L) 0.5 - 4.6 K/UL    Absolute Mono # 0.2 0.1 - 1.3 K/UL    Absolute Eos # 0.1 0.0 - 0.8 K/UL    Basophils Absolute 0.0 0.0 - 0.2 K/UL    Absolute Immature Granulocyte 0.0 0.0 - 0.5 K/UL   Lactic Acid Now and in 2 Hours    Collection Time: 06/03/22 10:37 AM   Result Value Ref Range    Lactic Acid, Plasma 1.4 0.4 - 2.0 MMOL/L   Procalcitonin Collection Time: 06/03/22 10:37 AM   Result Value Ref Range    Procalcitonin 0.10 0.00 - 0.49 ng/mL   EKG 12 Lead    Collection Time: 06/03/22 10:43 AM   Result Value Ref Range    Ventricular Rate 115 BPM    Atrial Rate 115 BPM    P-R Interval 152 ms    QRS Duration 69 ms    Q-T Interval 324 ms    QTc Calculation (Bazett) 449 ms    P Axis 59 degrees    R Axis -20 degrees    T Axis 47 degrees    Diagnosis Sinus tachycardia    Urinalysis w rflx microscopic    Collection Time: 06/03/22 10:52 AM   Result Value Ref Range    Color, UA YELLOW      Appearance HAZY      Specific [place], UA 1.020 1.001 - 1.023      pH, Urine 7.5 5.0 - 9.0      Protein, UA 30 (A) NEG mg/dL    Glucose, UA Negative mg/dL    Ketones, Urine TRACE (A) NEG mg/dL    Bilirubin Urine Negative NEG      Blood, Urine TRACE (A) NEG      Urobilinogen, Urine 0.2 0.2 - 1.0 EU/dL    Nitrite, Urine Positive (A) NEG      Leukocyte Esterase, Urine Negative NEG     Urinalysis, Micro    Collection Time: 06/03/22 10:52 AM   Result Value Ref Range    WBC, UA 0-3 0 /hpf    RBC, UA 3-5 0 /hpf    Epithelial Cells UA 0 0 /hpf    BACTERIA, URINE 4+ (H) 0 /hpf    Casts 0 0 /lpf    Crystals 0 0 /LPF    AMORPHOUS CRYSTAL TRACE 0      Mucus, UA 0 0 /lpf   Respiratory Panel, Molecular, with COVID-19 (Restricted: peds pts or suitable admitted adults)    Collection Time: 06/03/22 10:56 AM    Specimen: Nasopharyngeal   Result Value Ref Range    Adenovirus by PCR NOT DETECTED      Coronavirus 229E by PCR NOT DETECTED      Coronavirus HKU1 by PCR NOT DETECTED      Coronavirus NL63 by PCR NOT DETECTED      Coronavirus OC43 by PCR NOT DETECTED      SARS-CoV-2, PCR NOT DETECTED      Human Metapneumovirus by PCR NOT DETECTED      Rhinovirus Enterovirus PCR NOT DETECTED      Influenza A by PCR NOT DETECTED      INFLUENZA B PCR NOT DETECTED      Parainfluenza 1 PCR NOT DETECTED      Parainfluenza 2 PCR NOT DETECTED      Parainfluenza 3 PCR NOT DETECTED      Parainfluenza 4 PCR NOT DETECTED      Respiratory Syncytial Virus by PCR NOT DETECTED      Bordetella parapertussis by PCR NOT DETECTED      Bordetella pertussis by PCR NOT DETECTED      Chlamydophila Pneumonia PCR NOT DETECTED      Mycoplasma pneumo by PCR NOT DETECTED     Comprehensive Metabolic Panel    Collection Time: 06/03/22 12:44 PM   Result Value Ref Range    Sodium 138 136 - 145 mmol/L    Potassium 3.3 (L) 3.5 - 5.1 mmol/L    Chloride 104 98 - 107 mmol/L    CO2 24 21 - 32 mmol/L    Anion Gap 10 7 - 16 mmol/L    Glucose 125 (H) 65 - 100 mg/dL    BUN 7 (L) 8 - 23 MG/DL    CREATININE 0.62 0.6 - 1.0 MG/DL    GFR African American >60 >60 ml/min/1.73m2    GFR Non- >60 >60 ml/min/1.73m2    Calcium 8.7 8.3 - 10.4 MG/DL    Total Bilirubin 0.5 0.2 - 1.1 MG/DL    ALT 8 (L) 12 - 65 U/L    AST 14 (L) 15 - 37 U/L    Alk Phosphatase 113 50 - 136 U/L    Total Protein 6.6 6.3 - 8.2 g/dL    Albumin 3.0 (L) 3.2 - 4.6 g/dL    Globulin 3.6 (H) 2.3 - 3.5 g/dL    Albumin/Globulin Ratio 0.8 (L) 1.2 - 3.5     TSH with Reflex    Collection Time: 06/03/22 12:44 PM   Result Value Ref Range    TSH w Free Thyroid if Abnormal 0.87 0.358 - 3.740 UIU/ML     CT HEAD WO CONTRAST    Result Date: 6/3/2022  CT of the head without contrast. CLINICAL INDICATION: Lethargy, altered mental status PROCEDURE: Serial thin section axial images are obtained from the cranial vertex through the skull base without the administration of intravenous contrast. Radiation dose reduction techniques were used for this study. Our CT scanners use one or all of the following: Automated exposure control, adjusted of the mA and/or kV according to patient size, iterative reconstruction COMPARISON: Head CT dated 2/15/2013. FINDINGS: There is no acute intracranial hemorrhage, mass, or mass effect. No abnormal extra-axial fluid collections identified. There is no hydrocephalus. The basilar cisterns are widely patent.  Extensive bilateral patchy white matter hypoattenuation again noted. It is unchanged. No findings present to indicate large, cortical-based territorial infarction. There is a small old right basal ganglia lacunar infarct. No skull fracture or aggressive osseous lesion noted. Thick mucoperiosteal thickening is noted in the right maxillary sinus. The mastoid air cells are clear. 1. No acute intracranial abnormality. 2. Extensive bilateral white matter hypoattenuation most in keeping with chronic microangiopathic disease. 3. Chronic appearing mucoperiosteal thickening in the right maxillary sinus. XR CHEST PORTABLE    Result Date: 6/3/2022  Portable chest x-ray CLINICAL INDICATION: Altered mental status. FINDINGS: Single AP view the chest compared to a similar exam dated 8/17/2006 shows mild subsegmental atelectasis in the right midlung. Otherwise the lung parenchyma is clear. There is no pleural effusion or pneumothorax. The cardiac silhouette and mediastinum are unremarkable. Mild subsegmental atelectasis in the right midlung. Otherwise no acute abnormality. 3:14 PM  Family has arrived. They do state that the patient is much more lethargic than usual.  Sister was able to FaceTime with her last night. Patient gives occasional one-word answers. Patient answers yes when asked that she has had a headache. Sister states that she always has a headache. Sister states situation happened like this before that cleared up after a few days. Perhaps viral syndrome with metabolic encephalopathy. We will asked hospitalist regarding admission. Voice dictation software was used during the making of this note. This software is not perfect and grammatical and other typographical errors may be present. This note has not been completely proofread for errors.      Tayo Vail MD  06/03/22 1037       Tayo Vail MD  06/03/22 9171       Tayo Vail MD  06/03/22 3249

## 2022-06-04 LAB
ANION GAP SERPL CALC-SCNC: 13 MMOL/L (ref 7–16)
BASOPHILS # BLD: 0 K/UL (ref 0–0.2)
BASOPHILS NFR BLD: 0 % (ref 0–2)
BUN SERPL-MCNC: 7 MG/DL (ref 8–23)
CALCIUM SERPL-MCNC: 8.9 MG/DL (ref 8.3–10.4)
CHLORIDE SERPL-SCNC: 99 MMOL/L (ref 98–107)
CO2 SERPL-SCNC: 23 MMOL/L (ref 21–32)
CREAT SERPL-MCNC: 0.5 MG/DL (ref 0.6–1)
DIFFERENTIAL METHOD BLD: ABNORMAL
EOSINOPHIL # BLD: 0 K/UL (ref 0–0.8)
EOSINOPHIL NFR BLD: 0 % (ref 0.5–7.8)
ERYTHROCYTE [DISTWIDTH] IN BLOOD BY AUTOMATED COUNT: 13.3 % (ref 11.9–14.6)
GLUCOSE BLD STRIP.AUTO-MCNC: 108 MG/DL (ref 65–100)
GLUCOSE BLD STRIP.AUTO-MCNC: 119 MG/DL (ref 65–100)
GLUCOSE BLD STRIP.AUTO-MCNC: 127 MG/DL (ref 65–100)
GLUCOSE BLD STRIP.AUTO-MCNC: 132 MG/DL (ref 65–100)
GLUCOSE SERPL-MCNC: 133 MG/DL (ref 65–100)
HCT VFR BLD AUTO: 44.9 % (ref 35.8–46.3)
HGB BLD-MCNC: 14.7 G/DL (ref 11.7–15.4)
IMM GRANULOCYTES # BLD AUTO: 0 K/UL (ref 0–0.5)
IMM GRANULOCYTES NFR BLD AUTO: 0 % (ref 0–5)
LYMPHOCYTES # BLD: 0.7 K/UL (ref 0.5–4.6)
LYMPHOCYTES NFR BLD: 7 % (ref 13–44)
MAGNESIUM SERPL-MCNC: 2.3 MG/DL (ref 1.8–2.4)
MCH RBC QN AUTO: 27.6 PG (ref 26.1–32.9)
MCHC RBC AUTO-ENTMCNC: 32.7 G/DL (ref 31.4–35)
MCV RBC AUTO: 84.4 FL (ref 79.6–97.8)
MONOCYTES # BLD: 0.7 K/UL (ref 0.1–1.3)
MONOCYTES NFR BLD: 8 % (ref 4–12)
NEUTS SEG # BLD: 7.9 K/UL (ref 1.7–8.2)
NEUTS SEG NFR BLD: 85 % (ref 43–78)
NRBC # BLD: 0 K/UL (ref 0–0.2)
PLATELET # BLD AUTO: 142 K/UL (ref 150–450)
PMV BLD AUTO: 10 FL (ref 9.4–12.3)
POTASSIUM SERPL-SCNC: 2.9 MMOL/L (ref 3.5–5.1)
RBC # BLD AUTO: 5.32 M/UL (ref 4.05–5.2)
SERVICE CMNT-IMP: ABNORMAL
SODIUM SERPL-SCNC: 135 MMOL/L (ref 136–145)
WBC # BLD AUTO: 9.3 K/UL (ref 4.3–11.1)

## 2022-06-04 PROCEDURE — 80048 BASIC METABOLIC PNL TOTAL CA: CPT

## 2022-06-04 PROCEDURE — 85025 COMPLETE CBC W/AUTO DIFF WBC: CPT

## 2022-06-04 PROCEDURE — 6370000000 HC RX 637 (ALT 250 FOR IP): Performed by: FAMILY MEDICINE

## 2022-06-04 PROCEDURE — 82962 GLUCOSE BLOOD TEST: CPT

## 2022-06-04 PROCEDURE — 6360000002 HC RX W HCPCS: Performed by: FAMILY MEDICINE

## 2022-06-04 PROCEDURE — 51798 US URINE CAPACITY MEASURE: CPT

## 2022-06-04 PROCEDURE — 83735 ASSAY OF MAGNESIUM: CPT

## 2022-06-04 PROCEDURE — 2500000003 HC RX 250 WO HCPCS: Performed by: HOSPITALIST

## 2022-06-04 PROCEDURE — 36415 COLL VENOUS BLD VENIPUNCTURE: CPT

## 2022-06-04 PROCEDURE — 2580000003 HC RX 258: Performed by: INTERNAL MEDICINE

## 2022-06-04 PROCEDURE — 1100000000 HC RM PRIVATE

## 2022-06-04 PROCEDURE — 2580000003 HC RX 258: Performed by: FAMILY MEDICINE

## 2022-06-04 RX ORDER — DEXTROSE MONOHYDRATE 50 MG/ML
100 INJECTION, SOLUTION INTRAVENOUS PRN
Status: DISCONTINUED | OUTPATIENT
Start: 2022-06-04 | End: 2022-06-14 | Stop reason: HOSPADM

## 2022-06-04 RX ORDER — SODIUM CHLORIDE 9 MG/ML
INJECTION, SOLUTION INTRAVENOUS CONTINUOUS
Status: DISCONTINUED | OUTPATIENT
Start: 2022-06-04 | End: 2022-06-14 | Stop reason: HOSPADM

## 2022-06-04 RX ORDER — ROSUVASTATIN CALCIUM 10 MG/1
20 TABLET, COATED ORAL NIGHTLY
Status: DISCONTINUED | OUTPATIENT
Start: 2022-06-04 | End: 2022-06-14 | Stop reason: HOSPADM

## 2022-06-04 RX ORDER — ASPIRIN 325 MG
325 TABLET ORAL DAILY
Status: DISCONTINUED | OUTPATIENT
Start: 2022-06-04 | End: 2022-06-14 | Stop reason: HOSPADM

## 2022-06-04 RX ORDER — METOPROLOL TARTRATE 5 MG/5ML
5 INJECTION INTRAVENOUS EVERY 6 HOURS PRN
Status: DISCONTINUED | OUTPATIENT
Start: 2022-06-04 | End: 2022-06-14 | Stop reason: HOSPADM

## 2022-06-04 RX ORDER — GLUCAGON 1 MG/ML
1 KIT INJECTION PRN
Status: DISCONTINUED | OUTPATIENT
Start: 2022-06-04 | End: 2022-06-14 | Stop reason: HOSPADM

## 2022-06-04 RX ORDER — INSULIN LISPRO 100 [IU]/ML
0-8 INJECTION, SOLUTION INTRAVENOUS; SUBCUTANEOUS EVERY 6 HOURS
Status: DISCONTINUED | OUTPATIENT
Start: 2022-06-04 | End: 2022-06-08

## 2022-06-04 RX ADMIN — CARBOXYMETHYLCELLULOSE SODIUM 1 DROP: 10 GEL OPHTHALMIC at 21:45

## 2022-06-04 RX ADMIN — CEFTRIAXONE 1000 MG: 1 INJECTION, POWDER, FOR SOLUTION INTRAMUSCULAR; INTRAVENOUS at 17:31

## 2022-06-04 RX ADMIN — HEPARIN SODIUM 5000 UNITS: 5000 INJECTION INTRAVENOUS; SUBCUTANEOUS at 05:43

## 2022-06-04 RX ADMIN — SODIUM CHLORIDE: 900 INJECTION, SOLUTION INTRAVENOUS at 16:00

## 2022-06-04 RX ADMIN — CARBOXYMETHYLCELLULOSE SODIUM 1 DROP: 10 GEL OPHTHALMIC at 01:13

## 2022-06-04 RX ADMIN — SODIUM CHLORIDE, PRESERVATIVE FREE 10 ML: 5 INJECTION INTRAVENOUS at 21:49

## 2022-06-04 RX ADMIN — HEPARIN SODIUM 5000 UNITS: 5000 INJECTION INTRAVENOUS; SUBCUTANEOUS at 16:16

## 2022-06-04 RX ADMIN — METOPROLOL TARTRATE 5 MG: 5 INJECTION INTRAVENOUS at 23:55

## 2022-06-04 RX ADMIN — HEPARIN SODIUM 5000 UNITS: 5000 INJECTION INTRAVENOUS; SUBCUTANEOUS at 21:44

## 2022-06-04 ASSESSMENT — PAIN SCALES - WONG BAKER
WONGBAKER_NUMERICALRESPONSE: 0
WONGBAKER_NUMERICALRESPONSE: 0

## 2022-06-04 NOTE — PROGRESS NOTES
Hospitalist Progress Note   Admit Date:  6/3/2022 10:16 AM   Name:  Tasia Palmer   Age:  61 y.o. Sex:  female  :  1955   MRN:  [id number]   Room:  [facility]/    Presenting Complaint: Altered Mental Status     Reason(s) for Admission: Delirium [R41.0]  Acute febrile illness [Y42.7]  Acute metabolic encephalopathy [G76.27]     Hospital Course & Interval History:     Patient with past medical history of  Previous CVA with residual left-sided weakness  Bed-bound  Patient has no teeth but able to eat regular food. CAD  Type 2 diabetes mellitus   history of pulmonary embolism, not on anticoagulation now. Anxiety  Hypothyroidism  Chronic pain  Parkinson disease    Patient was brought to hospital from nursing facility due to change in mental status. Normally patient could speak without issues. No detailed history is available. Patient was found to have elevated blood pressure, with heart rate of 111/min. Potassium was low, procalcitonin was low. Viral panel was negative. UA is positive for nitrites and 4+ bacteria, no white blood cells however. CT head and chest x-ray showed no significant acute findings. Patient was admitted due to change in mental status and for treatment of UTI. Subjective/24hr Events (22):    22   Patient is lying in bed and opens eyes when she is called and stimulated. She does not answer questions. No fever. No shaking. No chills. No report of shortness of breath. No report of chest pain. No diarrhea. No blood per rectum. No blood per urine. Assessment & Plan:     Principal Problem:    Acute metabolic encephalopathy    UTI (urinary tract infection)  Patient is on empiric ceftriaxone 1 g every 24 hours. We will follow-up on culture result  We will monitor the patient's mentation, and response to treatment. Active Problems:    Diabetes mellitus ([address]  Monitor blood sugar. Cover with insulin sliding scale accordingly.         RYAN (obstructive sleep apnea)  Noted       Hypokalemia  Potassium supplementation      Stroke [facility])  Previous stroke  Continue with aspirin, rosuvastatin. HTN (hypertension)  Blood pressure is elevated sometimes. Will monitor closely. May need to start antihypertensive medication gingerly. Discharge Planning:    to be determined     Diet:  Diet NPO  DVT PPx: heparin SC   Code status: Full Code    Hospital Problems:  Principal Problem:    Acute metabolic encephalopathy  Active Problems:    UTI (urinary tract infection)    Diabetes mellitus (HCC)    RYAN (obstructive sleep apnea)    Hypokalemia    Stroke ([facility] [address]    HTN (hypertension)  Resolved Problems:    * No resolved hospital problems. *      Objective:     Patient Vitals for the past 24 hrs:   Temp Pulse Resp BP SpO2   06/04/22 0818 98.1 °F (36.7 °C) (!) 105 18 (!) 148/102 96 %   06/04/22 0338 97.9 °F (36.6 °C) (!) 110 12 (!) 141/104 95 %   06/03/22 2247 -- (!) 115 -- (!) 150/113 --   06/03/22 2243 98.2 °F (36.8 °C) (!) 121 -- (!) 140/111 95 %   06/03/22 1849 98.2 °F (36.8 °C) (!) 118 18 (!) 151/106 94 %   06/03/22 1516 -- (!) 108 -- -- --   06/03/22 1446 -- -- 20 -- --   06/03/22 1116 -- (!) 111 19 (!) 171/112 97 %   06/03/22 1056 -- (!) 114 20 (!) 162/110 96 %   06/03/22 1053 100.3 °F (37.9 °C) (!) 113 20 -- 95 %       Oxygen Therapy  SpO2: 96 %  O2 Device: None (Room air)  O2 Flow Rate (L/min): 4 L/min    Estimated body mass index is 25.92 kg/m² as calculated from the following:    Height as of this encounter: 5' 4\" (1.626 m). Weight as of this encounter: 151 lb (68.5 kg). Intake/Output Summary (Last 24 hours) at 6/4/2022 1049  Last data filed at 6/4/2022 0339  Gross per 24 hour   Intake 96 ml   Output 101 ml   Net -5 ml         Physical Exam:     Blood pressure (!) 148/102, pulse (!) 105, temperature 98.1 °F (36.7 °C), temperature source Oral, resp. rate 18, height 5' 4\" (1.626 m), weight 151 lb (68.5 kg), SpO2 96 %. General:    Well nourished. Patient is lying flat quietly in bed. She opens eyes when she is called. She does not answer questions. She does not answer her name when she was asked about it. Head:  Normocephalic, atraumatic  Eyes:  Sclerae appear normal.  Pupils equally round. ENT:  Nares appear normal, no drainage. Moist oral mucosa  Neck:  No restricted ROM. Trachea midline   CV:   RRR. No m/r/g. No jugular venous distension. Lungs:   CTAB. No wheezing, rhonchi, or rales. Respirations even, unlabored  Abdomen: Bowel sounds present. Soft, nontender, nondistended. Extremities: No cyanosis or clubbing. No edema  Skin:     No rashes and normal coloration. Warm and dry. Neuro:  As mentioned above, patient responded minimally to verbal stimuli. Does not seem to have localized weakness at this time. No spasticity of her extremities.        I have reviewed ordered lab tests and independently visualized imaging below:    Recent Labs:  Recent Results (from the past 48 hour(s))   CBC with Auto Differential    Collection Time: 06/03/22 10:37 AM   Result Value Ref Range    WBC 4.9 4.3 - 11.1 K/uL    RBC 4.64 4.05 - 5.2 M/uL    Hemoglobin 13.0 11.7 - 15.4 g/dL    Hematocrit 40.0 35.8 - 46.3 %    MCV 86.2 79.6 - 97.8 FL    MCH 28.0 26.1 - 32.9 PG    MCHC 32.5 31.4 - 35.0 g/dL    RDW 14.0 11.9 - 14.6 %    Platelets 509 (L) 348 - 450 K/uL    MPV 8.9 (L) 9.4 - 12.3 FL    nRBC 0.00 0.0 - 0.2 K/uL    Differential Type AUTOMATED      Seg Neutrophils 86 (H) 43 - 78 %    Lymphocytes 6 (L) 13 - 44 %    Monocytes 5 4.0 - 12.0 %    Eosinophils % 1 0.5 - 7.8 %    Basophils 1 0.0 - 2.0 %    Immature Granulocytes 1 0.0 - 5.0 %    Segs Absolute 4.2 1.7 - 8.2 K/UL    Absolute Lymph # 0.3 (L) 0.5 - 4.6 K/UL    Absolute Mono # 0.2 0.1 - 1.3 K/UL    Absolute Eos # 0.1 0.0 - 0.8 K/UL    Basophils Absolute 0.0 0.0 - 0.2 K/UL    Absolute Immature Granulocyte 0.0 0.0 - 0.5 K/UL   Lactic Acid Now and in 2 Hours    Collection Time: 06/03/22 10:37 AM   Result 0 0 /lpf   Respiratory Panel, Molecular, with COVID-19 (Restricted: peds pts or suitable admitted adults)    Collection Time: 06/03/22 10:56 AM    Specimen: Nasopharyngeal   Result Value Ref Range    Adenovirus by PCR NOT DETECTED      Coronavirus 229E by PCR NOT DETECTED      Coronavirus HKU1 by PCR NOT DETECTED      Coronavirus NL63 by PCR NOT DETECTED      Coronavirus OC43 by PCR NOT DETECTED      SARS-CoV-2, PCR NOT DETECTED      Human Metapneumovirus by PCR NOT DETECTED      Rhinovirus Enterovirus PCR NOT DETECTED      Influenza A by PCR NOT DETECTED      INFLUENZA B PCR NOT DETECTED      Parainfluenza 1 PCR NOT DETECTED      Parainfluenza 2 PCR NOT DETECTED      Parainfluenza 3 PCR NOT DETECTED      Parainfluenza 4 PCR NOT DETECTED      Respiratory Syncytial Virus by PCR NOT DETECTED      Bordetella parapertussis by PCR NOT DETECTED      Bordetella pertussis by PCR NOT DETECTED      Chlamydophila Pneumonia PCR NOT DETECTED      Mycoplasma pneumo by PCR NOT DETECTED     Culture, Blood 1    Collection Time: 06/03/22 12:44 PM    Specimen: Blood   Result Value Ref Range    Special Requests LEFT  HAND        Culture NO GROWTH AFTER 15 HOURS     Comprehensive Metabolic Panel    Collection Time: 06/03/22 12:44 PM   Result Value Ref Range    Sodium 138 136 - 145 mmol/L    Potassium 3.3 (L) 3.5 - 5.1 mmol/L    Chloride 104 98 - 107 mmol/L    CO2 24 21 - 32 mmol/L    Anion Gap 10 7 - 16 mmol/L    Glucose 125 (H) 65 - 100 mg/dL    BUN 7 (L) 8 - 23 MG/DL    CREATININE 0.62 0.6 - 1.0 MG/DL    GFR African American >60 >60 ml/min/1.73m2    GFR Non- >60 >60 ml/min/1.73m2    Calcium 8.7 8.3 - 10.4 MG/DL    Total Bilirubin 0.5 0.2 - 1.1 MG/DL    ALT 8 (L) 12 - 65 U/L    AST 14 (L) 15 - 37 U/L    Alk Phosphatase 113 50 - 136 U/L    Total Protein 6.6 6.3 - 8.2 g/dL    Albumin 3.0 (L) 3.2 - 4.6 g/dL    Globulin 3.6 (H) 2.3 - 3.5 g/dL    Albumin/Globulin Ratio 0.8 (L) 1.2 - 3.5     TSH with Reflex    Collection Time: 06/03/22 12:44 PM   Result Value Ref Range    TSH w Free Thyroid if Abnormal 0.87 0.358 - 3.740 UIU/ML   Magnesium    Collection Time: 06/03/22 12:44 PM   Result Value Ref Range    Magnesium 2.4 1.8 - 2.4 mg/dL   Glucose CSF    Collection Time: 06/03/22  4:23 PM   Result Value Ref Range    TUBE NUMBER 1      Glucose, CSF 67 40 - 70 MG/DL   Protein CSF    Collection Time: 06/03/22  4:23 PM   Result Value Ref Range    TUBE NUMBER 1      Protein, CSF 80 (H) 15 - 45 MG/DL   Culture, CSF    Collection Time: 06/03/22  4:23 PM    Specimen: CSF   Result Value Ref Range    Special Requests NO SPECIAL REQUESTS      Gram stain NO DEFINITE ORGANISM SEEN      Gram stain 0 wbc seen per hpf     Culture        No growth after short period of incubation. Further results to follow after overnight incubation.    Body fluid cell count    Collection Time: 06/03/22  4:23 PM   Result Value Ref Range    Specimen CEREBROSPINAL FLUID      Color, Fluid COLORLESS     Appearance, Fluid CLEAR      RBC, Fluid 3 /cu mm    WBC, Fluid 2 /cu mm   POCT Glucose    Collection Time: 06/03/22 10:43 PM   Result Value Ref Range    POC Glucose 148 (H) 65 - 100 mg/dL    Performed by: Aydin Rhodes    Basic Metabolic Panel w/ Reflex to MG    Collection Time: 06/04/22  4:28 AM   Result Value Ref Range    Sodium 135 (L) 136 - 145 mmol/L    Potassium 2.9 (L) 3.5 - 5.1 mmol/L    Chloride 99 98 - 107 mmol/L    CO2 23 21 - 32 mmol/L    Anion Gap 13 7 - 16 mmol/L    Glucose 133 (H) 65 - 100 mg/dL    BUN 7 (L) 8 - 23 MG/DL    CREATININE 0.50 (L) 0.6 - 1.0 MG/DL    GFR African American >60 >60 ml/min/1.73m2    GFR Non- >60 >60 ml/min/1.73m2    Calcium 8.9 8.3 - 10.4 MG/DL   CBC with Auto Differential    Collection Time: 06/04/22  4:28 AM   Result Value Ref Range    WBC 9.3 4.3 - 11.1 K/uL    RBC 5.32 (H) 4.05 - 5.2 M/uL    Hemoglobin 14.7 11.7 - 15.4 g/dL    Hematocrit 44.9 35.8 - 46.3 %    MCV 84.4 79.6 - 97.8 FL    MCH 27.6 26.1 - 32.9 PG    MCHC 32.7 31.4 - 35.0 g/dL    RDW 13.3 11.9 - 14.6 %    Platelets 303 (L) 190 - 450 K/uL    MPV 10.0 9.4 - 12.3 FL    nRBC 0.00 0.0 - 0.2 K/uL    Differential Type AUTOMATED      Seg Neutrophils 85 (H) 43 - 78 %    Lymphocytes 7 (L) 13 - 44 %    Monocytes 8 4.0 - 12.0 %    Eosinophils % 0 (L) 0.5 - 7.8 %    Basophils 0 0.0 - 2.0 %    Immature Granulocytes 0 0.0 - 5.0 %    Segs Absolute 7.9 1.7 - 8.2 K/UL    Absolute Lymph # 0.7 0.5 - 4.6 K/UL    Absolute Mono # 0.7 0.1 - 1.3 K/UL    Absolute Eos # 0.0 0.0 - 0.8 K/UL    Basophils Absolute 0.0 0.0 - 0.2 K/UL    Absolute Immature Granulocyte 0.0 0.0 - 0.5 K/UL   Magnesium    Collection Time: 06/04/22  4:28 AM   Result Value Ref Range    Magnesium 2.3 1.8 - 2.4 mg/dL   POCT Glucose    Collection Time: 06/04/22  8:20 AM   Result Value Ref Range    POC Glucose 132 (H) 65 - 100 mg/dL    Performed by: Nereida        Other Studies:  CT HEAD WO CONTRAST    Result Date: 6/3/2022  CT of the head without contrast. CLINICAL INDICATION: Lethargy, altered mental status PROCEDURE: Serial thin section axial images are obtained from the cranial vertex through the skull base without the administration of intravenous contrast. Radiation dose reduction techniques were used for this study. Our CT scanners use one or all of the following: Automated exposure control, adjusted of the mA and/or kV according to patient size, iterative reconstruction COMPARISON: Head CT dated 2/15/2013. FINDINGS: There is no acute intracranial hemorrhage, mass, or mass effect. No abnormal extra-axial fluid collections identified. There is no hydrocephalus. The basilar cisterns are widely patent. Extensive bilateral patchy white matter hypoattenuation again noted. It is unchanged. No findings present to indicate large, cortical-based territorial infarction. There is a small old right basal ganglia lacunar infarct. No skull fracture or aggressive osseous lesion noted.  Thick mucoperiosteal thickening is noted in the right maxillary sinus. The mastoid air cells are clear. 1. No acute intracranial abnormality. 2. Extensive bilateral white matter hypoattenuation most in keeping with chronic microangiopathic disease. 3. Chronic appearing mucoperiosteal thickening in the right maxillary sinus. XR CHEST PORTABLE    Result Date: 6/3/2022  Portable chest x-ray CLINICAL INDICATION: Altered mental status. FINDINGS: Single AP view the chest compared to a similar exam dated 8/17/2006 shows mild subsegmental atelectasis in the right midlung. Otherwise the lung parenchyma is clear. There is no pleural effusion or pneumothorax. The cardiac silhouette and mediastinum are unremarkable. Mild subsegmental atelectasis in the right midlung. Otherwise no acute abnormality.       Current Meds:  Current Facility-Administered Medications   Medication Dose Route Frequency    sodium chloride flush 0.9 % injection 3 mL  3 mL IntraVENous Q8H    DULoxetine (CYMBALTA) extended release capsule 120 mg  120 mg Oral Daily    insulin lispro (HUMALOG) injection vial 0-8 Units  0-8 Units SubCUTAneous TID WC    insulin lispro (HUMALOG) injection vial 0-4 Units  0-4 Units SubCUTAneous Nightly    levothyroxine (SYNTHROID) tablet 25 mcg  25 mcg Oral Daily    carbidopa-levodopa (SINEMET)  MG per tablet 2 tablet  2 tablet Oral TID    carboxymethylcellulose (THERATEARS) 1 % ophthalmic gel 1 drop  1 drop Both Eyes Q12H    ARIPiprazole (ABILIFY) tablet 15 mg  15 mg Oral Daily    topiramate (TOPAMAX) tablet 50 mg  50 mg Oral Daily    divalproex (DEPAKOTE SPRINKLE) capsule 250 mg  250 mg Oral 3 times per day    traZODone (DESYREL) tablet 25 mg  25 mg Oral Nightly    ketorolac (TORADOL) tablet 10 mg  10 mg Oral Q6H PRN    sodium chloride flush 0.9 % injection 5-40 mL  5-40 mL IntraVENous PRN    cefTRIAXone (ROCEPHIN) 1000 mg IVPB in NS 50ml minibag  1,000 mg IntraVENous Q24H    sodium chloride flush 0.9 % injection 5-40 mL  5-40 mL IntraVENous 2 times per day    sodium chloride flush 0.9 % injection 5-40 mL  5-40 mL IntraVENous PRN    0.9 % sodium chloride infusion   IntraVENous PRN    polyethylene glycol (GLYCOLAX) packet 17 g  17 g Oral Daily PRN    acetaminophen (TYLENOL) tablet 650 mg  650 mg Oral Q6H PRN    Or    acetaminophen (TYLENOL) suppository 650 mg  650 mg Rectal Q6H PRN    heparin (porcine) injection 5,000 Units  5,000 Units SubCUTAneous 3 times per day    potassium chloride (KLOR-CON M) extended release tablet 40 mEq  40 mEq Oral Once    hydrALAZINE (APRESOLINE) injection 20 mg  20 mg IntraVENous Q6H PRN    butalbital-acetaminophen-caffeine (FIORICET, ESGIC) per tablet 1 tablet  1 tablet Oral Q4H PRN    tuberculin injection 5 Units  5 Units IntraDERmal Once    pantoprazole (PROTONIX) 40 mg in sodium chloride (PF) 10 mL injection  40 mg IntraVENous Daily       Signed:  Gorge Lundberg MD    Part of this note may have been written by using a voice dictation software. The note has been proof read but may still contain some grammatical/other typographical errors.

## 2022-06-04 NOTE — PROGRESS NOTES
Attempted to see pt this am, however, pt not appropriate for PO on this date     Ja Pastrana MA/ISI/SLP

## 2022-06-05 ENCOUNTER — APPOINTMENT (OUTPATIENT)
Dept: GENERAL RADIOLOGY | Age: 67
DRG: 689 | End: 2022-06-05
Payer: MEDICARE

## 2022-06-05 LAB
ALBUMIN SERPL-MCNC: 3 G/DL (ref 3.2–4.6)
ALBUMIN/GLOB SERPL: 0.8 {RATIO} (ref 1.2–3.5)
ALP SERPL-CCNC: 111 U/L (ref 50–136)
ALT SERPL-CCNC: 11 U/L (ref 12–65)
ANION GAP SERPL CALC-SCNC: 11 MMOL/L (ref 7–16)
AST SERPL-CCNC: 15 U/L (ref 15–37)
BASOPHILS # BLD: 0 K/UL (ref 0–0.2)
BASOPHILS NFR BLD: 0 % (ref 0–2)
BILIRUB SERPL-MCNC: 0.3 MG/DL (ref 0.2–1.1)
BUN SERPL-MCNC: 15 MG/DL (ref 8–23)
CALCIUM SERPL-MCNC: 8.9 MG/DL (ref 8.3–10.4)
CHLORIDE SERPL-SCNC: 103 MMOL/L (ref 98–107)
CO2 SERPL-SCNC: 21 MMOL/L (ref 21–32)
CREAT SERPL-MCNC: 0.5 MG/DL (ref 0.6–1)
DIFFERENTIAL METHOD BLD: ABNORMAL
EOSINOPHIL # BLD: 0 K/UL (ref 0–0.8)
EOSINOPHIL NFR BLD: 0 % (ref 0.5–7.8)
ERYTHROCYTE [DISTWIDTH] IN BLOOD BY AUTOMATED COUNT: 13.6 % (ref 11.9–14.6)
GLOBULIN SER CALC-MCNC: 3.9 G/DL (ref 2.3–3.5)
GLUCOSE BLD STRIP.AUTO-MCNC: 109 MG/DL (ref 65–100)
GLUCOSE BLD STRIP.AUTO-MCNC: 110 MG/DL (ref 65–100)
GLUCOSE BLD STRIP.AUTO-MCNC: 114 MG/DL (ref 65–100)
GLUCOSE BLD STRIP.AUTO-MCNC: 125 MG/DL (ref 65–100)
GLUCOSE SERPL-MCNC: 127 MG/DL (ref 65–100)
HCT VFR BLD AUTO: 42.2 % (ref 35.8–46.3)
HGB BLD-MCNC: 14 G/DL (ref 11.7–15.4)
IMM GRANULOCYTES # BLD AUTO: 0.1 K/UL (ref 0–0.5)
IMM GRANULOCYTES NFR BLD AUTO: 1 % (ref 0–5)
LYMPHOCYTES # BLD: 1 K/UL (ref 0.5–4.6)
LYMPHOCYTES NFR BLD: 10 % (ref 13–44)
MCH RBC QN AUTO: 27.6 PG (ref 26.1–32.9)
MCHC RBC AUTO-ENTMCNC: 33.2 G/DL (ref 31.4–35)
MCV RBC AUTO: 83.2 FL (ref 79.6–97.8)
MONOCYTES # BLD: 1 K/UL (ref 0.1–1.3)
MONOCYTES NFR BLD: 10 % (ref 4–12)
NEUTS SEG # BLD: 8 K/UL (ref 1.7–8.2)
NEUTS SEG NFR BLD: 79 % (ref 43–78)
NRBC # BLD: 0 K/UL (ref 0–0.2)
PLATELET # BLD AUTO: 140 K/UL (ref 150–450)
PMV BLD AUTO: 10.2 FL (ref 9.4–12.3)
POTASSIUM SERPL-SCNC: 3.1 MMOL/L (ref 3.5–5.1)
PROT SERPL-MCNC: 6.9 G/DL (ref 6.3–8.2)
RBC # BLD AUTO: 5.07 M/UL (ref 4.05–5.2)
SERVICE CMNT-IMP: ABNORMAL
SODIUM SERPL-SCNC: 135 MMOL/L (ref 136–145)
WBC # BLD AUTO: 10.1 K/UL (ref 4.3–11.1)

## 2022-06-05 PROCEDURE — 94760 N-INVAS EAR/PLS OXIMETRY 1: CPT

## 2022-06-05 PROCEDURE — C9113 INJ PANTOPRAZOLE SODIUM, VIA: HCPCS | Performed by: HOSPITALIST

## 2022-06-05 PROCEDURE — 2500000003 HC RX 250 WO HCPCS: Performed by: HOSPITALIST

## 2022-06-05 PROCEDURE — 6370000000 HC RX 637 (ALT 250 FOR IP): Performed by: FAMILY MEDICINE

## 2022-06-05 PROCEDURE — 92610 EVALUATE SWALLOWING FUNCTION: CPT

## 2022-06-05 PROCEDURE — 2700000000 HC OXYGEN THERAPY PER DAY

## 2022-06-05 PROCEDURE — 1100000000 HC RM PRIVATE

## 2022-06-05 PROCEDURE — 51702 INSERT TEMP BLADDER CATH: CPT

## 2022-06-05 PROCEDURE — 2580000003 HC RX 258: Performed by: EMERGENCY MEDICINE

## 2022-06-05 PROCEDURE — 85025 COMPLETE CBC W/AUTO DIFF WBC: CPT

## 2022-06-05 PROCEDURE — 2580000003 HC RX 258: Performed by: INTERNAL MEDICINE

## 2022-06-05 PROCEDURE — 2580000003 HC RX 258: Performed by: FAMILY MEDICINE

## 2022-06-05 PROCEDURE — 36415 COLL VENOUS BLD VENIPUNCTURE: CPT

## 2022-06-05 PROCEDURE — 82962 GLUCOSE BLOOD TEST: CPT

## 2022-06-05 PROCEDURE — 80053 COMPREHEN METABOLIC PANEL: CPT

## 2022-06-05 PROCEDURE — A4216 STERILE WATER/SALINE, 10 ML: HCPCS | Performed by: HOSPITALIST

## 2022-06-05 PROCEDURE — 51798 US URINE CAPACITY MEASURE: CPT

## 2022-06-05 PROCEDURE — 6370000000 HC RX 637 (ALT 250 FOR IP): Performed by: INTERNAL MEDICINE

## 2022-06-05 PROCEDURE — 6360000002 HC RX W HCPCS: Performed by: FAMILY MEDICINE

## 2022-06-05 PROCEDURE — 2580000003 HC RX 258: Performed by: HOSPITALIST

## 2022-06-05 PROCEDURE — 6360000002 HC RX W HCPCS: Performed by: HOSPITALIST

## 2022-06-05 PROCEDURE — 71045 X-RAY EXAM CHEST 1 VIEW: CPT

## 2022-06-05 RX ADMIN — DIVALPROEX SODIUM 250 MG: 125 CAPSULE ORAL at 09:57

## 2022-06-05 RX ADMIN — HEPARIN SODIUM 5000 UNITS: 5000 INJECTION INTRAVENOUS; SUBCUTANEOUS at 22:25

## 2022-06-05 RX ADMIN — DIVALPROEX SODIUM 250 MG: 125 CAPSULE ORAL at 22:20

## 2022-06-05 RX ADMIN — METOPROLOL TARTRATE 5 MG: 5 INJECTION INTRAVENOUS at 06:31

## 2022-06-05 RX ADMIN — CARBOXYMETHYLCELLULOSE SODIUM 1 DROP: 10 GEL OPHTHALMIC at 10:12

## 2022-06-05 RX ADMIN — DULOXETINE HYDROCHLORIDE 120 MG: 30 CAPSULE, DELAYED RELEASE ORAL at 09:58

## 2022-06-05 RX ADMIN — CARBIDOPA AND LEVODOPA 2 TABLET: 25; 100 TABLET ORAL at 22:24

## 2022-06-05 RX ADMIN — CEFTRIAXONE 1000 MG: 1 INJECTION, POWDER, FOR SOLUTION INTRAMUSCULAR; INTRAVENOUS at 17:09

## 2022-06-05 RX ADMIN — HYDRALAZINE HYDROCHLORIDE 20 MG: 20 INJECTION INTRAMUSCULAR; INTRAVENOUS at 03:02

## 2022-06-05 RX ADMIN — TOPIRAMATE 50 MG: 50 TABLET ORAL at 09:58

## 2022-06-05 RX ADMIN — HEPARIN SODIUM 5000 UNITS: 5000 INJECTION INTRAVENOUS; SUBCUTANEOUS at 13:47

## 2022-06-05 RX ADMIN — SODIUM CHLORIDE, PRESERVATIVE FREE 40 MG: 5 INJECTION INTRAVENOUS at 09:58

## 2022-06-05 RX ADMIN — TRAZODONE HYDROCHLORIDE 25 MG: 50 TABLET ORAL at 22:20

## 2022-06-05 RX ADMIN — ARIPIPRAZOLE 15 MG: 5 TABLET ORAL at 09:57

## 2022-06-05 RX ADMIN — LEVOTHYROXINE SODIUM 25 MCG: 0.05 TABLET ORAL at 09:58

## 2022-06-05 RX ADMIN — HEPARIN SODIUM 5000 UNITS: 5000 INJECTION INTRAVENOUS; SUBCUTANEOUS at 06:24

## 2022-06-05 RX ADMIN — CARBOXYMETHYLCELLULOSE SODIUM 1 DROP: 10 GEL OPHTHALMIC at 22:28

## 2022-06-05 RX ADMIN — CARBIDOPA AND LEVODOPA 2 TABLET: 25; 100 TABLET ORAL at 09:57

## 2022-06-05 RX ADMIN — DIVALPROEX SODIUM 250 MG: 125 CAPSULE ORAL at 13:47

## 2022-06-05 RX ADMIN — SODIUM CHLORIDE, PRESERVATIVE FREE 10 ML: 5 INJECTION INTRAVENOUS at 22:29

## 2022-06-05 RX ADMIN — CARBIDOPA AND LEVODOPA 2 TABLET: 25; 100 TABLET ORAL at 13:46

## 2022-06-05 RX ADMIN — SODIUM CHLORIDE, PRESERVATIVE FREE 10 ML: 5 INJECTION INTRAVENOUS at 10:02

## 2022-06-05 RX ADMIN — SODIUM CHLORIDE: 900 INJECTION, SOLUTION INTRAVENOUS at 06:24

## 2022-06-05 RX ADMIN — ASPIRIN 325 MG ORAL TABLET 325 MG: 325 PILL ORAL at 10:04

## 2022-06-05 RX ADMIN — KETOROLAC TROMETHAMINE 10 MG: 10 TABLET, FILM COATED ORAL at 10:01

## 2022-06-05 RX ADMIN — ROSUVASTATIN CALCIUM 20 MG: 10 TABLET, FILM COATED ORAL at 22:20

## 2022-06-05 RX ADMIN — SODIUM CHLORIDE, PRESERVATIVE FREE 3 ML: 5 INJECTION INTRAVENOUS at 17:09

## 2022-06-05 ASSESSMENT — PAIN DESCRIPTION - DESCRIPTORS: DESCRIPTORS: ACHING

## 2022-06-05 ASSESSMENT — PAIN DESCRIPTION - LOCATION: LOCATION: HEAD

## 2022-06-05 ASSESSMENT — PAIN SCALES - WONG BAKER
WONGBAKER_NUMERICALRESPONSE: 0

## 2022-06-05 NOTE — PROGRESS NOTES
LTG: Patient will tolerate least restrictive oral diet without overt s/sx of airway compromise. STG: Patient will tolerate minced and moist with thin liquids without overt s/sx of airway compromise. STG pt will participate in a full ST evaluation if indicated. SPEECH LANGUAGE PATHOLOGY: DYSPHAGIA  Initial Assessment    NAME: Zachariah Pastor  : 1955  MRN: [id number]    ADMISSION DATE: 6/3/2022  ADMITTING DIAGNOSIS: has Diabetes mellitus ([facility] [address] Nausea; Coagulopathy ([facility] [address] RYAN (obstructive sleep apnea); Syncope and collapse; Anemia, unspecified; Hypokalemia; Abdominal pain; Stroke [facility]); Diarrhea; Hematoma; Tachycardia; ARF (acute renal failure) (HCC); DJD (degenerative joint disease); HTN (hypertension); Acute metabolic encephalopathy; and UTI (urinary tract infection) on their problem list.  Date of Eval: 2022  ICD-10: Treatment Diagnosis: R13.11 Dysphagia, Oral Phase    RECOMMENDATIONS   Diet:  Diet Solids Recommendation: Minced & Moist  Liquid Consistency Recommendation: Thin    Medications: Crushed in puree as able     Recommendations: Assistance with meals; Dysphagia treatment   Compensatory Swallowing Strategies:Assist feed; Alternate solids and liquids;Upright as possible for all oral intake;Small bites/sips   Therapeutic Intervention:Patient/Family education;Diet tolerance monitoring   Patient continues to require skilled intervention: Yes  D/C Recommendations: Ongoing speech therapy is recommended during this hospitalization     ASSESSMENT    Dysphagia Diagnosis: Mild to moderate oral stage dysphagia  Dysphagia Impression : Pt much more alert today and accepted PO trials. Pt tolerated thin liquids via cup and straw and pureed with no overt signs/x of aspiration. Pt with increased mastcation with mixed. Solid not assessed. ? family to bring in dentures. recommend minced and moist with thin liquids with medication crushed/whole in pureed. follow up PO trials/diet tolerance.  ST evaluation if indicated      GENERAL    History of Present Injury/Illness: Ms. Megan Urbina  has a past medical history of Arthritis, CAD (coronary artery disease), Chronic kidney disease, Chronic pain, Coagulation defects, Diabetes ([address] GERD (gastroesophageal reflux disease), Hypertension, Morbid obesity ([facility] [address] PE (pulmonary thromboembolism) ([facility] [address] Psychiatric disorder, Stroke ([facility] [address] Thromboembolus ([facility] [address] Thyroid disease, and Unspecified sleep apnea. . She also  has a past surgical history that includes Tonsillectomy (as teen); Adenoidectomy (as child); heent; Hysterectomy (1992); Cholecystectomy, laparoscopic; and other surgical history. Chart Reviewed: Yes  Behavior/Cognition: Confused; Alert  Patient Position: upright in bed  Communication Observation: Non-verbal  Follows Directions: None  Respiratory Status: O2 via nasual cannula     O2 Device: Nasal cannula              Current Diet : NPO     Pain:   Patient does not appear in pain                                        OBJECTIVE    Patient Positioning: Upright in bed   Oral Motor   Labial: No impairment  Dentition: Edentulous  Oral Hygiene: Clean;Moist  Oral Hygiene Comments: adequate  Lingual: No impairment  Dentition: Edentulous (family needs to bring in)           Oropharyngeal Phase:  Pureed;Thin;Mixed Consistencies  Neuromuscular Estim Used: No  Assessment Method(s): Observation  Patient Position: upright in bed  Consistency Presented: Mixed consistency; Thin;Pureed  How Presented: SLP-fed/Presented;Straw;Cup/sip;Spoon  Bolus Acceptance: No impairment  Bolus Formation/Control: Impaired  Type of Impairment: Delayed;Mastication  Propulsion: Delayed (# of seconds)  Oral Residue: 10-50% of bolus  Initiation of Swallow: No impairment  Aspiration Signs/Symptoms: None  Pharyngeal Phase Characteristics: No impairment, issues, or problems  Effective Modifications: Small sips and bites; Alternate liquids/solids  Cues for Modifications: Minimal        Oral Phase - Comment: mild-mod  Pharyngeal Phase: WFL  PLAN    Duration/Frequency: Continue to follow patient 2x/week for duration of hospitalization and/or until goals met    Dysphagia Outcome and Severity Scale (GURPREET)  Dysphagia Outcome Severity Scale: Level 5: Mild dysphagia- Distant supervision. May need one diet consistency restricted  Interpretation of Tool: The Dysphagia Outcome and Severity Scale (GURPREET) is a simple, easy-to-use, 7-point scale developed to systematically rate the functional severity of dysphagia based on objective assessment and make recommendations for diet level, independence level, and type of nutrition. Normal(7), Functional(6), Mild(5), Mild-Moderate(4), Moderate(3), Moderate-Severe(2), Severe(1)    Speech Therapy Prognosis  Prognosis: Fair  Prognosis Considerations: Age; Co-Morbidities; Medical Prognosis; Potential    Education: RN  Patient Education: dysphagia, diet, positioning, aspiration risk  Patient Education Response: Demonstrated understanding    Current Medications:   No current facility-administered medications on file prior to encounter. Current Outpatient Medications on File Prior to Encounter   Medication Sig Dispense Refill    ARIPiprazole (ABILIFY) 15 MG tablet Take 15 mg by mouth      aspirin 325 MG tablet Take 325 mg by mouth daily      Cetirizine HCl 10 MG CAPS Take by mouth      docusate (COLACE, DULCOLAX) 100 MG CAPS Take 100 mg by mouth 2 times daily      DULoxetine (CYMBALTA) 60 MG extended release capsule Take 60 mg by mouth      esomeprazole (NEXIUM) 40 MG delayed release capsule Take by mouth daily      fluconazole (DIFLUCAN) 200 MG tablet Take 200 mg by mouth 2 times daily      liothyronine (CYTOMEL) 25 MCG tablet Take 25 mcg by mouth daily      LORazepam (ATIVAN) 2 MG tablet Take 2 mg by mouth 2 times daily.       nitroGLYCERIN (NITROSTAT) 0.4 MG SL tablet Place 0.4 mg under the tongue      oxyCODONE (OXY-IR) 15 MG immediate release tablet Take 15 mg by mouth every 8 hours as needed.  pregabalin (LYRICA) 75 MG capsule Take by mouth.  rosuvastatin (CRESTOR) 40 MG tablet Take 20 mg by mouth      tolterodine (DETROL LA) 4 MG extended release capsule Take 4 mg by mouth daily      traMADol (ULTRAM) 50 MG tablet Take 50 mg by mouth every 6 hours as needed.  zolpidem (AMBIEN) 10 MG tablet Take by mouth. PRECAUTIONS/ALLERGIES: Acetaminophen, Carisoprodol, Cephalexin, Cyclobenzaprine, Erythromycin, Gabapentin, Hydrocodone-acetaminophen, Metoclopramide, Penicillins, Povidone-iodine, Risperidone, Sulfa antibiotics, Codeine, and Oxycodone-acetaminophen   Safety Devices in place: Yes  Type of devices:  All fall risk precautions in place,Nurse notified,Call light within reach  Restraints Initially in Place: No    Therapy Time  SLP Individual Minutes  Time In: 0830  Time Out: 0840  Minutes: [address] [place], [place]  6/5/2022 9:47 AM

## 2022-06-05 NOTE — PROGRESS NOTES
Reviewed notes for new spiritual concerns      FULL CODE    EXP D/C  2  DAYS    LIVES IN [facility]    RISK HIGH FOR DECLINE      Will follow as needed

## 2022-06-05 NOTE — PROGRESS NOTES
Hourly rounds completed. All needs met. Pt hr and bp elevated during the shift. Made On call MD aware. Orders were placed. Pt did not void throughout the shift. Bladder scanned pt. See flowsheet for results. Gave report to the oncoming day shift nurse.

## 2022-06-05 NOTE — PROGRESS NOTES
Hospitalist Progress Note   Admit Date:  6/3/2022 10:16 AM   Name:  Hui Swenson   Age:  61 y.o. Sex:  female  :  1955   MRN:  [id number]   Room:  [facility]/    Presenting Complaint: Altered Mental Status     Reason(s) for Admission: Delirium [R41.0]  Acute febrile illness [O31.6]  Acute metabolic encephalopathy [R16.65]     Hospital Course & Interval History:     Patient with past medical history of  Previous CVA with residual left-sided weakness  Bed-bound  Patient has no teeth but able to eat regular food. CAD  Type 2 diabetes mellitus   history of pulmonary embolism, not on anticoagulation now. Anxiety  Hypothyroidism  Chronic pain  Parkinson disease    Patient was brought to hospital from nursing facility due to change in mental status. Normally patient could speak without issues. No detailed history is available. Patient was found to have elevated blood pressure, with heart rate of 111/min. Potassium was low, procalcitonin was low. Viral panel was negative. UA is positive for nitrites and 4+ bacteria, no white blood cells however. CT head and chest x-ray showed no significant acute findings. Patient was admitted due to change in mental status and for treatment of UTI. Subjective/24hr Events (22):    22   Patient is lying in bed and opens eyes when she is called and stimulated. She does not answer questions. No fever. No shaking. No chills. No report of shortness of breath. No report of chest pain. No diarrhea. No blood per rectum. No blood per urine. 22   Patient is mostly lying in bed and not talking. She does not respond to verbal stimuli. No fever in the last 24 hours. No chest pain. No shortness of breath. Patient is assessed by speech therapist and is allowed to have minced and moist diet now.        Assessment & Plan:     Principal Problem:    Acute metabolic encephalopathy    UTI (urinary tract infection)  Patient is on empiric ceftriaxone 1 g every 24 hours. We will follow-up on culture result  We will monitor the patient's mentation, and response to treatment. So far, blood and urine cultures are negative. Speech therapist deems patient suitable for minced and moist diet. Will allow this. Active Problems:    Diabetes mellitus ([address]  Monitor blood sugar. Cover with insulin sliding scale accordingly. Blood sugar is mostly in 100s ranges. That is acceptable. RYAN (obstructive sleep apnea)  Noted       Hypokalemia  Potassium supplementation  Check CMP and CBC today. Stroke [facility])  Previous stroke  Continue with aspirin, rosuvastatin. HTN (hypertension)  Blood pressure is elevated sometimes. Will monitor closely. May need to start antihypertensive medication gingerly. Will give PRN medication, Hydralazine. Discharge Planning:    to be determined     Diet:  ADULT DIET; Dysphagia - Minced and Moist  DVT PPx: heparin SC   Code status: Full Code    Hospital Problems:  Principal Problem:    Acute metabolic encephalopathy  Active Problems:    UTI (urinary tract infection)    Diabetes mellitus (HCC)    RYAN (obstructive sleep apnea)    Hypokalemia    Stroke (HCC)    HTN (hypertension)  Resolved Problems:    * No resolved hospital problems.  *      Objective:     Patient Vitals for the past 24 hrs:   Temp Pulse Resp BP SpO2   06/05/22 0754 98.7 °F (37.1 °C) (!) 105 16 (!) 157/66 97 %   06/05/22 0621 -- (!) 132 -- 136/86 --   06/05/22 0300 97.5 °F (36.4 °C) (!) 107 15 (!) 183/98 92 %   06/05/22 0115 -- 97 -- (!) 148/107 94 %   06/04/22 2322 97.3 °F (36.3 °C) (!) 126 15 (!) 188/109 95 %   06/04/22 2130 -- (!) 116 -- -- 96 %   06/04/22 2040 98.2 °F (36.8 °C) (!) 120 15 (!) 163/97 100 %   06/04/22 1527 -- (!) 115 14 (!) 141/100 97 %   06/04/22 1208 97.9 °F (36.6 °C) (!) 106 20 (!) 147/77 98 %       Oxygen Therapy  SpO2: 97 %  O2 Device: None (Room air)  O2 Flow Rate (L/min): 4 L/min    Estimated body mass index is 25.92 kg/m² as calculated from the following:    Height as of this encounter: 5' 4\" (1.626 m). Weight as of this encounter: 151 lb (68.5 kg). No intake or output data in the 24 hours ending 06/05/22 0851      Physical Exam:     Blood pressure (!) 157/66, pulse (!) 105, temperature 98.7 °F (37.1 °C), temperature source Axillary, resp. rate 16, height 5' 4\" (1.626 m), weight 151 lb (68.5 kg), SpO2 97 %. General:    Well nourished. Patient is lying flat quietly in bed. She opens eyes when she is called. She does not answer questions. She does not answer her name when she was asked about it. Head:  Normocephalic, atraumatic, not pale. No icterus. Eyes:  Sclerae appear normal.  Pupils equally round. ENT:  Nares appear normal, no drainage. Moist oral mucosa  Neck:  No restricted ROM. Trachea midline   CV:   RRR. No m/r/g. No jugular venous distension. Lungs:   CTAB. No wheezing, rhonchi, or rales. Respirations even, unlabored  Abdomen: Bowel sounds present. Soft, nontender, nondistended. Extremities: No cyanosis or clubbing. No edema  Skin:     No rashes and normal coloration. Warm and dry. Neuro:  As mentioned above, patient responded minimally to verbal stimuli. Does not seem to have localized weakness at this time. No spasticity of her extremities.        I have reviewed ordered lab tests and independently visualized imaging below:    Recent Labs:  Recent Results (from the past 48 hour(s))   CBC with Auto Differential    Collection Time: 06/03/22 10:37 AM   Result Value Ref Range    WBC 4.9 4.3 - 11.1 K/uL    RBC 4.64 4.05 - 5.2 M/uL    Hemoglobin 13.0 11.7 - 15.4 g/dL    Hematocrit 40.0 35.8 - 46.3 %    MCV 86.2 79.6 - 97.8 FL    MCH 28.0 26.1 - 32.9 PG    MCHC 32.5 31.4 - 35.0 g/dL    RDW 14.0 11.9 - 14.6 %    Platelets 842 (L) 924 - 450 K/uL    MPV 8.9 (L) 9.4 - 12.3 FL    nRBC 0.00 0.0 - 0.2 K/uL    Differential Type AUTOMATED      Seg Neutrophils 86 (H) 43 - 78 %    Lymphocytes 6 (L) 13 - 44 %    Monocytes 5 4.0 - 12.0 %    Eosinophils % 1 0.5 - 7.8 %    Basophils 1 0.0 - 2.0 %    Immature Granulocytes 1 0.0 - 5.0 %    Segs Absolute 4.2 1.7 - 8.2 K/UL    Absolute Lymph # 0.3 (L) 0.5 - 4.6 K/UL    Absolute Mono # 0.2 0.1 - 1.3 K/UL    Absolute Eos # 0.1 0.0 - 0.8 K/UL    Basophils Absolute 0.0 0.0 - 0.2 K/UL    Absolute Immature Granulocyte 0.0 0.0 - 0.5 K/UL   Lactic Acid Now and in 2 Hours    Collection Time: 06/03/22 10:37 AM   Result Value Ref Range    Lactic Acid, Plasma 1.4 0.4 - 2.0 MMOL/L   Culture, Blood 1    Collection Time: 06/03/22 10:37 AM    Specimen: Blood   Result Value Ref Range    Special Requests RIGHT  HAND        Culture NO GROWTH 2 DAYS     Procalcitonin    Collection Time: 06/03/22 10:37 AM   Result Value Ref Range    Procalcitonin 0.10 0.00 - 0.49 ng/mL   TSH    Collection Time: 06/03/22 10:37 AM   Result Value Ref Range    TSH, 3RD GENERATION 0.905 0.358 - 3.740 uIU/mL   T4    Collection Time: 06/03/22 10:37 AM   Result Value Ref Range    T4 Free 9.4 4.8 - 13.9 ug/dL   EKG 12 Lead    Collection Time: 06/03/22 10:43 AM   Result Value Ref Range    Ventricular Rate 115 BPM    Atrial Rate 115 BPM    P-R Interval 152 ms    QRS Duration 69 ms    Q-T Interval 324 ms    QTc Calculation (Bazett) 449 ms    P Axis 59 degrees    R Axis -20 degrees    T Axis 47 degrees    Diagnosis Sinus tachycardia    Culture, Urine    Collection Time: 06/03/22 10:52 AM    Specimen: Urine    CATH URINE   Result Value Ref Range    Special Requests NO SPECIAL REQUESTS      Culture        No growth after short period of incubation. Further results to follow after overnight incubation.    Urinalysis w rflx microscopic    Collection Time: 06/03/22 10:52 AM   Result Value Ref Range    Color, UA YELLOW      Appearance HAZY      Specific [place], UA 1.020 1.001 - 1.023      pH, Urine 7.5 5.0 - 9.0      Protein, UA 30 (A) NEG mg/dL    Glucose, UA Negative mg/dL    Ketones, Urine TRACE (A) NEG mg/dL Bilirubin Urine Negative NEG      Blood, Urine TRACE (A) NEG      Urobilinogen, Urine 0.2 0.2 - 1.0 EU/dL    Nitrite, Urine Positive (A) NEG      Leukocyte Esterase, Urine Negative NEG     Urinalysis, Micro    Collection Time: 06/03/22 10:52 AM   Result Value Ref Range    WBC, UA 0-3 0 /hpf    RBC, UA 3-5 0 /hpf    Epithelial Cells UA 0 0 /hpf    BACTERIA, URINE 4+ (H) 0 /hpf    Casts 0 0 /lpf    Crystals 0 0 /LPF    AMORPHOUS CRYSTAL TRACE 0      Mucus, UA 0 0 /lpf   Respiratory Panel, Molecular, with COVID-19 (Restricted: peds pts or suitable admitted adults)    Collection Time: 06/03/22 10:56 AM    Specimen: Nasopharyngeal   Result Value Ref Range    Adenovirus by PCR NOT DETECTED      Coronavirus 229E by PCR NOT DETECTED      Coronavirus HKU1 by PCR NOT DETECTED      Coronavirus NL63 by PCR NOT DETECTED      Coronavirus OC43 by PCR NOT DETECTED      SARS-CoV-2, PCR NOT DETECTED      Human Metapneumovirus by PCR NOT DETECTED      Rhinovirus Enterovirus PCR NOT DETECTED      Influenza A by PCR NOT DETECTED      INFLUENZA B PCR NOT DETECTED      Parainfluenza 1 PCR NOT DETECTED      Parainfluenza 2 PCR NOT DETECTED      Parainfluenza 3 PCR NOT DETECTED      Parainfluenza 4 PCR NOT DETECTED      Respiratory Syncytial Virus by PCR NOT DETECTED      Bordetella parapertussis by PCR NOT DETECTED      Bordetella pertussis by PCR NOT DETECTED      Chlamydophila Pneumonia PCR NOT DETECTED      Mycoplasma pneumo by PCR NOT DETECTED     Culture, Blood 1    Collection Time: 06/03/22 12:44 PM    Specimen: Blood   Result Value Ref Range    Special Requests LEFT  HAND        Culture NO GROWTH 2 DAYS     Comprehensive Metabolic Panel    Collection Time: 06/03/22 12:44 PM   Result Value Ref Range    Sodium 138 136 - 145 mmol/L    Potassium 3.3 (L) 3.5 - 5.1 mmol/L    Chloride 104 98 - 107 mmol/L    CO2 24 21 - 32 mmol/L    Anion Gap 10 7 - 16 mmol/L    Glucose 125 (H) 65 - 100 mg/dL    BUN 7 (L) 8 - 23 MG/DL    CREATININE 0.62 0.6 - 1.0 MG/DL    GFR African American >60 >60 ml/min/1.73m2    GFR Non- >60 >60 ml/min/1.73m2    Calcium 8.7 8.3 - 10.4 MG/DL    Total Bilirubin 0.5 0.2 - 1.1 MG/DL    ALT 8 (L) 12 - 65 U/L    AST 14 (L) 15 - 37 U/L    Alk Phosphatase 113 50 - 136 U/L    Total Protein 6.6 6.3 - 8.2 g/dL    Albumin 3.0 (L) 3.2 - 4.6 g/dL    Globulin 3.6 (H) 2.3 - 3.5 g/dL    Albumin/Globulin Ratio 0.8 (L) 1.2 - 3.5     TSH with Reflex    Collection Time: 06/03/22 12:44 PM   Result Value Ref Range    TSH w Free Thyroid if Abnormal 0.87 0.358 - 3.740 UIU/ML   Magnesium    Collection Time: 06/03/22 12:44 PM   Result Value Ref Range    Magnesium 2.4 1.8 - 2.4 mg/dL   Glucose CSF    Collection Time: 06/03/22  4:23 PM   Result Value Ref Range    TUBE NUMBER 1      Glucose, CSF 67 40 - 70 MG/DL   Protein CSF    Collection Time: 06/03/22  4:23 PM   Result Value Ref Range    TUBE NUMBER 1      Protein, CSF 80 (H) 15 - 45 MG/DL   Culture, CSF    Collection Time: 06/03/22  4:23 PM    Specimen: CSF   Result Value Ref Range    Special Requests NO SPECIAL REQUESTS      Gram stain NO DEFINITE ORGANISM SEEN      Gram stain 0 wbc seen per hpf     Culture        No growth after short period of incubation. Further results to follow after overnight incubation.    Body fluid cell count    Collection Time: 06/03/22  4:23 PM   Result Value Ref Range    Specimen CEREBROSPINAL FLUID      Color, Fluid COLORLESS     Appearance, Fluid CLEAR      RBC, Fluid 3 /cu mm    WBC, Fluid 2 /cu mm   POCT Glucose    Collection Time: 06/03/22 10:43 PM   Result Value Ref Range    POC Glucose 148 (H) 65 - 100 mg/dL    Performed by: Randee Chun    Basic Metabolic Panel w/ Reflex to MG    Collection Time: 06/04/22  4:28 AM   Result Value Ref Range    Sodium 135 (L) 136 - 145 mmol/L    Potassium 2.9 (L) 3.5 - 5.1 mmol/L    Chloride 99 98 - 107 mmol/L    CO2 23 21 - 32 mmol/L    Anion Gap 13 7 - 16 mmol/L    Glucose 133 (H) 65 - 100 mg/dL    BUN 7 (L) 8 - 23 MG/DL CREATININE 0.50 (L) 0.6 - 1.0 MG/DL    GFR African American >60 >60 ml/min/1.73m2    GFR Non- >60 >60 ml/min/1.73m2    Calcium 8.9 8.3 - 10.4 MG/DL   CBC with Auto Differential    Collection Time: 06/04/22  4:28 AM   Result Value Ref Range    WBC 9.3 4.3 - 11.1 K/uL    RBC 5.32 (H) 4.05 - 5.2 M/uL    Hemoglobin 14.7 11.7 - 15.4 g/dL    Hematocrit 44.9 35.8 - 46.3 %    MCV 84.4 79.6 - 97.8 FL    MCH 27.6 26.1 - 32.9 PG    MCHC 32.7 31.4 - 35.0 g/dL    RDW 13.3 11.9 - 14.6 %    Platelets 944 (L) 744 - 450 K/uL    MPV 10.0 9.4 - 12.3 FL    nRBC 0.00 0.0 - 0.2 K/uL    Differential Type AUTOMATED      Seg Neutrophils 85 (H) 43 - 78 %    Lymphocytes 7 (L) 13 - 44 %    Monocytes 8 4.0 - 12.0 %    Eosinophils % 0 (L) 0.5 - 7.8 %    Basophils 0 0.0 - 2.0 %    Immature Granulocytes 0 0.0 - 5.0 %    Segs Absolute 7.9 1.7 - 8.2 K/UL    Absolute Lymph # 0.7 0.5 - 4.6 K/UL    Absolute Mono # 0.7 0.1 - 1.3 K/UL    Absolute Eos # 0.0 0.0 - 0.8 K/UL    Basophils Absolute 0.0 0.0 - 0.2 K/UL    Absolute Immature Granulocyte 0.0 0.0 - 0.5 K/UL   Magnesium    Collection Time: 06/04/22  4:28 AM   Result Value Ref Range    Magnesium 2.3 1.8 - 2.4 mg/dL   POCT Glucose    Collection Time: 06/04/22  8:20 AM   Result Value Ref Range    POC Glucose 132 (H) 65 - 100 mg/dL    Performed by: Red MapachegaetanoLiveset    POCT Glucose    Collection Time: 06/04/22 12:11 PM   Result Value Ref Range    POC Glucose 119 (H) 65 - 100 mg/dL    Performed by: Red MapachegaetanoFuture DomainCT    POCT Glucose    Collection Time: 06/04/22  4:33 PM   Result Value Ref Range    POC Glucose 127 (H) 65 - 100 mg/dL    Performed by: Nereida    POCT Glucose    Collection Time: 06/04/22  8:35 PM   Result Value Ref Range    POC Glucose 108 (H) 65 - 100 mg/dL    Performed by: Jr    POCT Glucose    Collection Time: 06/05/22  1:14 AM   Result Value Ref Range    POC Glucose 125 (H) 65 - 100 mg/dL    Performed by: Ricky    POCT Glucose    Collection Time: 06/05/22  6:23 AM   Result Value Ref Range    POC Glucose 114 (H) 65 - 100 mg/dL    Performed by: Jackie Chacon        Other Studies:  CT HEAD WO CONTRAST    Result Date: 6/3/2022  CT of the head without contrast. CLINICAL INDICATION: Lethargy, altered mental status PROCEDURE: Serial thin section axial images are obtained from the cranial vertex through the skull base without the administration of intravenous contrast. Radiation dose reduction techniques were used for this study. Our CT scanners use one or all of the following: Automated exposure control, adjusted of the mA and/or kV according to patient size, iterative reconstruction COMPARISON: Head CT dated 2/15/2013. FINDINGS: There is no acute intracranial hemorrhage, mass, or mass effect. No abnormal extra-axial fluid collections identified. There is no hydrocephalus. The basilar cisterns are widely patent. Extensive bilateral patchy white matter hypoattenuation again noted. It is unchanged. No findings present to indicate large, cortical-based territorial infarction. There is a small old right basal ganglia lacunar infarct. No skull fracture or aggressive osseous lesion noted. Thick mucoperiosteal thickening is noted in the right maxillary sinus. The mastoid air cells are clear. 1. No acute intracranial abnormality. 2. Extensive bilateral white matter hypoattenuation most in keeping with chronic microangiopathic disease. 3. Chronic appearing mucoperiosteal thickening in the right maxillary sinus. XR CHEST PORTABLE    Result Date: 6/3/2022  Portable chest x-ray CLINICAL INDICATION: Altered mental status. FINDINGS: Single AP view the chest compared to a similar exam dated 8/17/2006 shows mild subsegmental atelectasis in the right midlung. Otherwise the lung parenchyma is clear. There is no pleural effusion or pneumothorax. The cardiac silhouette and mediastinum are unremarkable. Mild subsegmental atelectasis in the right midlung.  Otherwise no acute abnormality.       Current Meds:  Current Facility-Administered Medications   Medication Dose Route Frequency    aspirin tablet 325 mg  325 mg Oral Daily    rosuvastatin (CRESTOR) tablet 20 mg  20 mg Oral Nightly    0.9 % sodium chloride infusion   IntraVENous Continuous    insulin lispro (HUMALOG) injection vial 0-8 Units  0-8 Units SubCUTAneous Q6H    glucose chewable tablet 16 g  4 tablet Oral PRN    dextrose bolus 10% 125 mL  125 mL IntraVENous PRN    Or    dextrose bolus 10% 250 mL  250 mL IntraVENous PRN    glucagon injection 1 mg  1 mg IntraMUSCular PRN    dextrose 5 % solution  100 mL/hr IntraVENous PRN    metoprolol (LOPRESSOR) injection 5 mg  5 mg IntraVENous Q6H PRN    sodium chloride flush 0.9 % injection 3 mL  3 mL IntraVENous Q8H    DULoxetine (CYMBALTA) extended release capsule 120 mg  120 mg Oral Daily    levothyroxine (SYNTHROID) tablet 25 mcg  25 mcg Oral Daily    carbidopa-levodopa (SINEMET)  MG per tablet 2 tablet  2 tablet Oral TID    carboxymethylcellulose (THERATEARS) 1 % ophthalmic gel 1 drop  1 drop Both Eyes Q12H    ARIPiprazole (ABILIFY) tablet 15 mg  15 mg Oral Daily    topiramate (TOPAMAX) tablet 50 mg  50 mg Oral Daily    divalproex (DEPAKOTE SPRINKLE) capsule 250 mg  250 mg Oral 3 times per day    traZODone (DESYREL) tablet 25 mg  25 mg Oral Nightly    ketorolac (TORADOL) tablet 10 mg  10 mg Oral Q6H PRN    sodium chloride flush 0.9 % injection 5-40 mL  5-40 mL IntraVENous PRN    cefTRIAXone (ROCEPHIN) 1000 mg IVPB in NS 50ml minibag  1,000 mg IntraVENous Q24H    sodium chloride flush 0.9 % injection 5-40 mL  5-40 mL IntraVENous 2 times per day    sodium chloride flush 0.9 % injection 5-40 mL  5-40 mL IntraVENous PRN    0.9 % sodium chloride infusion   IntraVENous PRN    polyethylene glycol (GLYCOLAX) packet 17 g  17 g Oral Daily PRN    acetaminophen (TYLENOL) tablet 650 mg  650 mg Oral Q6H PRN    Or    acetaminophen (TYLENOL) suppository 650 mg 650 mg Rectal Q6H PRN    heparin (porcine) injection 5,000 Units  5,000 Units SubCUTAneous 3 times per day    potassium chloride (KLOR-CON M) extended release tablet 40 mEq  40 mEq Oral Once    hydrALAZINE (APRESOLINE) injection 20 mg  20 mg IntraVENous Q6H PRN    butalbital-acetaminophen-caffeine (FIORICET, ESGIC) per tablet 1 tablet  1 tablet Oral Q4H PRN    tuberculin injection 5 Units  5 Units IntraDERmal Once    pantoprazole (PROTONIX) 40 mg in sodium chloride (PF) 10 mL injection  40 mg IntraVENous Daily       Signed:  Tyson Skiff, MD    Part of this note may have been written by using a voice dictation software. The note has been proof read but may still contain some grammatical/other typographical errors.

## 2022-06-06 LAB
ANION GAP SERPL CALC-SCNC: 9 MMOL/L (ref 7–16)
BACTERIA SPEC CULT: ABNORMAL
BACTERIA SPEC CULT: ABNORMAL
BUN SERPL-MCNC: 9 MG/DL (ref 8–23)
CALCIUM SERPL-MCNC: 8.5 MG/DL (ref 8.3–10.4)
CHLORIDE SERPL-SCNC: 106 MMOL/L (ref 98–107)
CO2 SERPL-SCNC: 21 MMOL/L (ref 21–32)
CREAT SERPL-MCNC: 0.5 MG/DL (ref 0.6–1)
GLUCOSE BLD STRIP.AUTO-MCNC: 105 MG/DL (ref 65–100)
GLUCOSE BLD STRIP.AUTO-MCNC: 108 MG/DL (ref 65–100)
GLUCOSE BLD STRIP.AUTO-MCNC: 113 MG/DL (ref 65–100)
GLUCOSE BLD STRIP.AUTO-MCNC: 96 MG/DL (ref 65–100)
GLUCOSE SERPL-MCNC: 122 MG/DL (ref 65–100)
POTASSIUM SERPL-SCNC: 3 MMOL/L (ref 3.5–5.1)
SERVICE CMNT-IMP: ABNORMAL
SERVICE CMNT-IMP: NORMAL
SODIUM SERPL-SCNC: 136 MMOL/L (ref 136–145)

## 2022-06-06 PROCEDURE — 2580000003 HC RX 258: Performed by: FAMILY MEDICINE

## 2022-06-06 PROCEDURE — C9113 INJ PANTOPRAZOLE SODIUM, VIA: HCPCS | Performed by: HOSPITALIST

## 2022-06-06 PROCEDURE — 2580000003 HC RX 258: Performed by: EMERGENCY MEDICINE

## 2022-06-06 PROCEDURE — 2580000003 HC RX 258: Performed by: HOSPITALIST

## 2022-06-06 PROCEDURE — A4216 STERILE WATER/SALINE, 10 ML: HCPCS | Performed by: HOSPITALIST

## 2022-06-06 PROCEDURE — 80048 BASIC METABOLIC PNL TOTAL CA: CPT

## 2022-06-06 PROCEDURE — 6370000000 HC RX 637 (ALT 250 FOR IP): Performed by: INTERNAL MEDICINE

## 2022-06-06 PROCEDURE — 82962 GLUCOSE BLOOD TEST: CPT

## 2022-06-06 PROCEDURE — 6360000002 HC RX W HCPCS: Performed by: HOSPITALIST

## 2022-06-06 PROCEDURE — 51702 INSERT TEMP BLADDER CATH: CPT

## 2022-06-06 PROCEDURE — 92526 ORAL FUNCTION THERAPY: CPT

## 2022-06-06 PROCEDURE — 6360000002 HC RX W HCPCS: Performed by: FAMILY MEDICINE

## 2022-06-06 PROCEDURE — 6370000000 HC RX 637 (ALT 250 FOR IP): Performed by: FAMILY MEDICINE

## 2022-06-06 PROCEDURE — 1100000000 HC RM PRIVATE

## 2022-06-06 PROCEDURE — 36415 COLL VENOUS BLD VENIPUNCTURE: CPT

## 2022-06-06 PROCEDURE — 2580000003 HC RX 258: Performed by: INTERNAL MEDICINE

## 2022-06-06 RX ORDER — POTASSIUM CHLORIDE 20 MEQ/1
40 TABLET, EXTENDED RELEASE ORAL 2 TIMES DAILY WITH MEALS
Status: DISCONTINUED | OUTPATIENT
Start: 2022-06-06 | End: 2022-06-07

## 2022-06-06 RX ADMIN — TOPIRAMATE 50 MG: 50 TABLET ORAL at 08:58

## 2022-06-06 RX ADMIN — CEFTRIAXONE 1000 MG: 1 INJECTION, POWDER, FOR SOLUTION INTRAMUSCULAR; INTRAVENOUS at 17:30

## 2022-06-06 RX ADMIN — DULOXETINE HYDROCHLORIDE 120 MG: 30 CAPSULE, DELAYED RELEASE ORAL at 08:58

## 2022-06-06 RX ADMIN — LEVOTHYROXINE SODIUM 25 MCG: 0.05 TABLET ORAL at 08:59

## 2022-06-06 RX ADMIN — HEPARIN SODIUM 5000 UNITS: 5000 INJECTION INTRAVENOUS; SUBCUTANEOUS at 14:48

## 2022-06-06 RX ADMIN — POTASSIUM CHLORIDE 40 MEQ: 20 TABLET, EXTENDED RELEASE ORAL at 08:58

## 2022-06-06 RX ADMIN — POTASSIUM CHLORIDE 40 MEQ: 20 TABLET, EXTENDED RELEASE ORAL at 17:30

## 2022-06-06 RX ADMIN — CARBIDOPA AND LEVODOPA 2 TABLET: 25; 100 TABLET ORAL at 08:59

## 2022-06-06 RX ADMIN — SODIUM CHLORIDE, PRESERVATIVE FREE 3 ML: 5 INJECTION INTRAVENOUS at 01:42

## 2022-06-06 RX ADMIN — SODIUM CHLORIDE, PRESERVATIVE FREE 3 ML: 5 INJECTION INTRAVENOUS at 17:31

## 2022-06-06 RX ADMIN — ROSUVASTATIN CALCIUM 20 MG: 10 TABLET, FILM COATED ORAL at 21:02

## 2022-06-06 RX ADMIN — CARBIDOPA AND LEVODOPA 2 TABLET: 25; 100 TABLET ORAL at 21:02

## 2022-06-06 RX ADMIN — SODIUM CHLORIDE, PRESERVATIVE FREE 40 MG: 5 INJECTION INTRAVENOUS at 09:00

## 2022-06-06 RX ADMIN — NYSTATIN 500000 UNITS: 100000 SUSPENSION ORAL at 21:02

## 2022-06-06 RX ADMIN — DIVALPROEX SODIUM 250 MG: 125 CAPSULE ORAL at 05:31

## 2022-06-06 RX ADMIN — CARBIDOPA AND LEVODOPA 2 TABLET: 25; 100 TABLET ORAL at 14:48

## 2022-06-06 RX ADMIN — DIVALPROEX SODIUM 250 MG: 125 CAPSULE ORAL at 14:48

## 2022-06-06 RX ADMIN — SODIUM CHLORIDE: 900 INJECTION, SOLUTION INTRAVENOUS at 09:07

## 2022-06-06 RX ADMIN — TRAZODONE HYDROCHLORIDE 25 MG: 50 TABLET ORAL at 21:02

## 2022-06-06 RX ADMIN — HEPARIN SODIUM 5000 UNITS: 5000 INJECTION INTRAVENOUS; SUBCUTANEOUS at 05:31

## 2022-06-06 RX ADMIN — ASPIRIN 325 MG ORAL TABLET 325 MG: 325 PILL ORAL at 09:00

## 2022-06-06 RX ADMIN — DIVALPROEX SODIUM 250 MG: 125 CAPSULE ORAL at 21:03

## 2022-06-06 RX ADMIN — CARBOXYMETHYLCELLULOSE SODIUM 1 DROP: 10 GEL OPHTHALMIC at 21:03

## 2022-06-06 RX ADMIN — POTASSIUM CHLORIDE 40 MEQ: 20 TABLET, EXTENDED RELEASE ORAL at 09:00

## 2022-06-06 RX ADMIN — ARIPIPRAZOLE 15 MG: 5 TABLET ORAL at 08:59

## 2022-06-06 RX ADMIN — SODIUM CHLORIDE, PRESERVATIVE FREE 10 ML: 5 INJECTION INTRAVENOUS at 09:00

## 2022-06-06 RX ADMIN — HEPARIN SODIUM 5000 UNITS: 5000 INJECTION INTRAVENOUS; SUBCUTANEOUS at 21:02

## 2022-06-06 RX ADMIN — CARBOXYMETHYLCELLULOSE SODIUM 1 DROP: 10 GEL OPHTHALMIC at 09:08

## 2022-06-06 ASSESSMENT — PAIN SCALES - WONG BAKER
WONGBAKER_NUMERICALRESPONSE: 0
WONGBAKER_NUMERICALRESPONSE: 0

## 2022-06-06 NOTE — PROGRESS NOTES
Hospitalist Progress Note   Admit Date:  6/3/2022 10:16 AM   Name:  Aida Louise   Age:  61 y.o. Sex:  female  :  1955   MRN:  [id number]   Room:  [facility]    Presenting Complaint: Altered Mental Status     Reason(s) for Admission: Delirium [R41.0]  Acute febrile illness [Y16.2]  Acute metabolic encephalopathy [Z56.08]     Hospital Course & Interval History:     Patient with past medical history of  Previous CVA with residual left-sided weakness  Bed-bound  Patient has no teeth but able to eat regular food. CAD  Type 2 diabetes mellitus   history of pulmonary embolism, not on anticoagulation now. Anxiety  Hypothyroidism  Chronic pain  Parkinson disease    Patient was brought to hospital from nursing facility due to change in mental status. Normally patient could speak without issues. No detailed history is available. Patient was found to have elevated blood pressure, with heart rate of 111/min. Potassium was low, procalcitonin was low. Viral panel was negative. UA is positive for nitrites and 4+ bacteria, no white blood cells however. CT head and chest x-ray showed no significant acute findings. Patient was admitted due to change in mental status and for treatment of UTI. Subjective/24hr Events (22):    22   Patient is lying in bed and opens eyes when she is called and stimulated. She does not answer questions. No fever. No shaking. No chills. No report of shortness of breath. No report of chest pain. No diarrhea. No blood per rectum. No blood per urine. 22   Patient is mostly lying in bed and not talking. She does not respond to verbal stimuli. No fever in the last 24 hours. No chest pain. No shortness of breath. Patient is assessed by speech therapist and is allowed to have minced and moist diet now. 22   Patient is lying in bed and does not respond much verbally to stimuli. No fever. No shaking. No chills.    No shortness of breath. Assessment & Plan:     Principal Problem:    Acute metabolic encephalopathy    UTI (urinary tract infection)  Patient is on empiric ceftriaxone 1 g every 24 hours. So far, blood and urine cultures are negative. Speech therapist deems patient suitable for minced and moist diet. Will allow this. Mentation does not improve much although she seems more responsive to stimuli. Active Problems:    Diabetes mellitus ([facility] [address]  Monitor blood sugar. Cover with insulin sliding scale accordingly. Blood sugar is mostly in 100s ranges. That is acceptable. RYAN (obstructive sleep apnea)  Noted       Hypokalemia  Potassium supplementation  Check CMP and CBC today. Stroke [facility])  Previous stroke  Continue with aspirin, rosuvastatin. HTN (hypertension)  Blood pressure is elevated sometimes. Will monitor closely. May need to start antihypertensive medication gingerly. On PRN medication, Hydralazine. Discharge Planning:    to be determined     Diet:  ADULT DIET; Full Liquid  DVT PPx: heparin SC   Code status: Full Code    Hospital Problems:  Principal Problem:    Acute metabolic encephalopathy  Active Problems:    UTI (urinary tract infection)    Diabetes mellitus (HCC)    RYAN (obstructive sleep apnea)    Hypokalemia    Stroke ([facility]    HTN (hypertension)  Resolved Problems:    * No resolved hospital problems.  *      Objective:     Patient Vitals for the past 24 hrs:   Temp Pulse Resp BP SpO2   06/06/22 0810 97.7 °F (36.5 °C) (!) 106 16 (!) 149/87 100 %   06/05/22 2349 98.4 °F (36.9 °C) (!) 107 18 101/64 97 %   06/05/22 2007 97.7 °F (36.5 °C) (!) 103 18 (!) 131/58 100 %   06/05/22 1556 -- 100 16 -- 95 %   06/05/22 1446 97.3 °F (36.3 °C) (!) 101 20 116/73 97 %   06/05/22 1104 97 °F (36.1 °C) (!) 102 16 (!) 147/76 98 %       Oxygen Therapy  SpO2: 100 %  Pulse Oximeter Device Mode: Intermittent  O2 Device: Nasal cannula  O2 Flow Rate (L/min): 2 L/min  O2 Delivery Method: Nasal cannula    Estimated body mass index is 26.87 kg/m² as calculated from the following:    Height as of this encounter: 5' 4\" (1.626 m). Weight as of this encounter: 156 lb 8.4 oz (71 kg). Intake/Output Summary (Last 24 hours) at 6/6/2022 1042  Last data filed at 6/6/2022 0906  Gross per 24 hour   Intake 985 ml   Output 675 ml   Net 310 ml         Physical Exam:     Blood pressure (!) 149/87, pulse (!) 106, temperature 97.7 °F (36.5 °C), temperature source Oral, resp. rate 16, height 5' 4\" (1.626 m), weight 156 lb 8.4 oz (71 kg), SpO2 100 %. General:    Well nourished. Patient is lying flat quietly in bed. She opens eyes when she is called. She does not answer questions. She does not answer her name when she was asked about it. She seems to stare at me more today. Head:  Normocephalic, atraumatic, not pale. No icterus. Eyes:  Sclerae appear normal.  Pupils equally round. ENT:  Nares appear normal, no drainage. Moist oral mucosa  Neck:  No restricted ROM. Trachea midline   CV:   RRR. No m/r/g. No jugular venous distension. Lungs:   CTAB. No wheezing, rhonchi, or rales. Respirations even, unlabored  Abdomen: Bowel sounds present. Soft, nontender, nondistended. Extremities: No cyanosis or clubbing. No edema  Skin:     No rashes and normal coloration. Warm and dry. Neuro:  As mentioned above, patient responded minimally to verbal stimuli. Does not seem to have localized weakness at this time. No spasticity of her extremities.        I have reviewed ordered lab tests and independently visualized imaging below:    Recent Labs:  Recent Results (from the past 48 hour(s))   POCT Glucose    Collection Time: 06/04/22 12:11 PM   Result Value Ref Range    POC Glucose 119 (H) 65 - 100 mg/dL    Performed by: Explore Engage    POCT Glucose    Collection Time: 06/04/22  4:33 PM   Result Value Ref Range    POC Glucose 127 (H) 65 - 100 mg/dL    Performed by: Explore Engage    POCT Glucose    Collection Time: 06/04/22  8:35 PM   Result Value Ref Range    POC Glucose 108 (H) 65 - 100 mg/dL    Performed by: Marilu Jean    POCT Glucose    Collection Time: 06/05/22  1:14 AM   Result Value Ref Range    POC Glucose 125 (H) 65 - 100 mg/dL    Performed by: Amaya Cerda    POCT Glucose    Collection Time: 06/05/22  6:23 AM   Result Value Ref Range    POC Glucose 114 (H) 65 - 100 mg/dL    Performed by: Amaya Cerda    CBC with Auto Differential    Collection Time: 06/05/22  9:54 AM   Result Value Ref Range    WBC 10.1 4.3 - 11.1 K/uL    RBC 5.07 4.05 - 5.2 M/uL    Hemoglobin 14.0 11.7 - 15.4 g/dL    Hematocrit 42.2 35.8 - 46.3 %    MCV 83.2 79.6 - 97.8 FL    MCH 27.6 26.1 - 32.9 PG    MCHC 33.2 31.4 - 35.0 g/dL    RDW 13.6 11.9 - 14.6 %    Platelets 578 (L) 444 - 450 K/uL    MPV 10.2 9.4 - 12.3 FL    nRBC 0.00 0.0 - 0.2 K/uL    Differential Type AUTOMATED      Seg Neutrophils 79 (H) 43 - 78 %    Lymphocytes 10 (L) 13 - 44 %    Monocytes 10 4.0 - 12.0 %    Eosinophils % 0 (L) 0.5 - 7.8 %    Basophils 0 0.0 - 2.0 %    Immature Granulocytes 1 0.0 - 5.0 %    Segs Absolute 8.0 1.7 - 8.2 K/UL    Absolute Lymph # 1.0 0.5 - 4.6 K/UL    Absolute Mono # 1.0 0.1 - 1.3 K/UL    Absolute Eos # 0.0 0.0 - 0.8 K/UL    Basophils Absolute 0.0 0.0 - 0.2 K/UL    Absolute Immature Granulocyte 0.1 0.0 - 0.5 K/UL   Comprehensive Metabolic Panel    Collection Time: 06/05/22  9:54 AM   Result Value Ref Range    Sodium 135 (L) 136 - 145 mmol/L    Potassium 3.1 (L) 3.5 - 5.1 mmol/L    Chloride 103 98 - 107 mmol/L    CO2 21 21 - 32 mmol/L    Anion Gap 11 7 - 16 mmol/L    Glucose 127 (H) 65 - 100 mg/dL    BUN 15 8 - 23 MG/DL    CREATININE 0.50 (L) 0.6 - 1.0 MG/DL    GFR African American >60 >60 ml/min/1.73m2    GFR Non- >60 >60 ml/min/1.73m2    Calcium 8.9 8.3 - 10.4 MG/DL    Total Bilirubin 0.3 0.2 - 1.1 MG/DL    ALT 11 (L) 12 - 65 U/L    AST 15 15 - 37 U/L    Alk Phosphatase 111 50 - 136 U/L    Total Protein 6.9 6.3 - 8.2 g/dL    Albumin 3.0 (L) 3.2 - 4.6 g/dL    Globulin 3.9 (H) 2.3 - 3.5 g/dL    Albumin/Globulin Ratio 0.8 (L) 1.2 - 3.5     POCT Glucose    Collection Time: 06/05/22 12:59 PM   Result Value Ref Range    POC Glucose 110 (H) 65 - 100 mg/dL    Performed by: AnishyHollyPCT    POCT Glucose    Collection Time: 06/05/22  4:30 PM   Result Value Ref Range    POC Glucose 109 (H) 65 - 100 mg/dL    Performed by: TerriHollyPCT    POCT Glucose    Collection Time: 06/06/22 12:53 AM   Result Value Ref Range    POC Glucose 113 (H) 65 - 100 mg/dL    Performed by: Tracy    POCT Glucose    Collection Time: 06/06/22  5:08 AM   Result Value Ref Range    POC Glucose 96 65 - 100 mg/dL    Performed by: Chaz Bruce    Basic Metabolic Panel    Collection Time: 06/06/22  9:23 AM   Result Value Ref Range    Sodium 136 136 - 145 mmol/L    Potassium 3.0 (L) 3.5 - 5.1 mmol/L    Chloride 106 98 - 107 mmol/L    CO2 21 21 - 32 mmol/L    Anion Gap 9 7 - 16 mmol/L    Glucose 122 (H) 65 - 100 mg/dL    BUN 9 8 - 23 MG/DL    CREATININE 0.50 (L) 0.6 - 1.0 MG/DL    GFR African American >60 >60 ml/min/1.73m2    GFR Non- >60 >60 ml/min/1.73m2    Calcium 8.5 8.3 - 10.4 MG/DL       Other Studies:  CT HEAD WO CONTRAST    Result Date: 6/3/2022  CT of the head without contrast. CLINICAL INDICATION: Lethargy, altered mental status PROCEDURE: Serial thin section axial images are obtained from the cranial vertex through the skull base without the administration of intravenous contrast. Radiation dose reduction techniques were used for this study. Our CT scanners use one or all of the following: Automated exposure control, adjusted of the mA and/or kV according to patient size, iterative reconstruction COMPARISON: Head CT dated 2/15/2013. FINDINGS: There is no acute intracranial hemorrhage, mass, or mass effect. No abnormal extra-axial fluid collections identified. There is no hydrocephalus. The basilar cisterns are widely patent.  Extensive bilateral patchy white matter hypoattenuation again noted. It is unchanged. No findings present to indicate large, cortical-based territorial infarction. There is a small old right basal ganglia lacunar infarct. No skull fracture or aggressive osseous lesion noted. Thick mucoperiosteal thickening is noted in the right maxillary sinus. The mastoid air cells are clear. 1. No acute intracranial abnormality. 2. Extensive bilateral white matter hypoattenuation most in keeping with chronic microangiopathic disease. 3. Chronic appearing mucoperiosteal thickening in the right maxillary sinus. XR CHEST PORTABLE    Result Date: 6/3/2022  Portable chest x-ray CLINICAL INDICATION: Altered mental status. FINDINGS: Single AP view the chest compared to a similar exam dated 8/17/2006 shows mild subsegmental atelectasis in the right midlung. Otherwise the lung parenchyma is clear. There is no pleural effusion or pneumothorax. The cardiac silhouette and mediastinum are unremarkable. Mild subsegmental atelectasis in the right midlung. Otherwise no acute abnormality.       Current Meds:  Current Facility-Administered Medications   Medication Dose Route Frequency    potassium chloride (KLOR-CON M) extended release tablet 40 mEq  40 mEq Oral BID WC    aspirin tablet 325 mg  325 mg Oral Daily    rosuvastatin (CRESTOR) tablet 20 mg  20 mg Oral Nightly    0.9 % sodium chloride infusion   IntraVENous Continuous    insulin lispro (HUMALOG) injection vial 0-8 Units  0-8 Units SubCUTAneous Q6H    glucose chewable tablet 16 g  4 tablet Oral PRN    dextrose bolus 10% 125 mL  125 mL IntraVENous PRN    Or    dextrose bolus 10% 250 mL  250 mL IntraVENous PRN    glucagon injection 1 mg  1 mg IntraMUSCular PRN    dextrose 5 % solution  100 mL/hr IntraVENous PRN    metoprolol (LOPRESSOR) injection 5 mg  5 mg IntraVENous Q6H PRN    sodium chloride flush 0.9 % injection 3 mL  3 mL IntraVENous Q8H    DULoxetine (CYMBALTA) extended release capsule 120 mg  120 mg Oral Daily    levothyroxine (SYNTHROID) tablet 25 mcg  25 mcg Oral Daily    carbidopa-levodopa (SINEMET)  MG per tablet 2 tablet  2 tablet Oral TID    carboxymethylcellulose (THERATEARS) 1 % ophthalmic gel 1 drop  1 drop Both Eyes Q12H    ARIPiprazole (ABILIFY) tablet 15 mg  15 mg Oral Daily    topiramate (TOPAMAX) tablet 50 mg  50 mg Oral Daily    divalproex (DEPAKOTE SPRINKLE) capsule 250 mg  250 mg Oral 3 times per day    traZODone (DESYREL) tablet 25 mg  25 mg Oral Nightly    ketorolac (TORADOL) tablet 10 mg  10 mg Oral Q6H PRN    sodium chloride flush 0.9 % injection 5-40 mL  5-40 mL IntraVENous PRN    cefTRIAXone (ROCEPHIN) 1000 mg IVPB in NS 50ml minibag  1,000 mg IntraVENous Q24H    sodium chloride flush 0.9 % injection 5-40 mL  5-40 mL IntraVENous 2 times per day    sodium chloride flush 0.9 % injection 5-40 mL  5-40 mL IntraVENous PRN    0.9 % sodium chloride infusion   IntraVENous PRN    polyethylene glycol (GLYCOLAX) packet 17 g  17 g Oral Daily PRN    acetaminophen (TYLENOL) tablet 650 mg  650 mg Oral Q6H PRN    Or    acetaminophen (TYLENOL) suppository 650 mg  650 mg Rectal Q6H PRN    heparin (porcine) injection 5,000 Units  5,000 Units SubCUTAneous 3 times per day    hydrALAZINE (APRESOLINE) injection 20 mg  20 mg IntraVENous Q6H PRN    butalbital-acetaminophen-caffeine (FIORICET, ESGIC) per tablet 1 tablet  1 tablet Oral Q4H PRN    pantoprazole (PROTONIX) 40 mg in sodium chloride (PF) 10 mL injection  40 mg IntraVENous Daily       Signed:  Jermaine Sommers MD    Part of this note may have been written by using a voice dictation software. The note has been proof read but may still contain some grammatical/other typographical errors.

## 2022-06-06 NOTE — PROGRESS NOTES
Patient has rested quietly through the night, no s/s of discomfort. Hourly rounding performed with frequent position changes. Bed rails up x 3, low position, door open for observation. Will continue to monitor.

## 2022-06-06 NOTE — PROGRESS NOTES
LTG: Patient will tolerate least restrictive oral diet without overt s/sx of airway compromise. STG: Patient will tolerate  full liquids/thin liquids without overt s/sx of airway compromise. Downgraded   STG: Patient will participate in modified barium swallow study for objective assessment of oropharyngeal swallow function. Added   STG pt will participate in a full ST evaluation if indicated. SPEECH LANGUAGE PATHOLOGY: DYSPHAGIA  Daily Note #1    NAME: Willian Cook  : 1955  MRN: [id number]    ADMISSION DATE: 6/3/2022  ADMITTING DIAGNOSIS: has Diabetes mellitus ([facility] [address] Nausea; Coagulopathy ([facility] [address] RYAN (obstructive sleep apnea); Syncope and collapse; Anemia, unspecified; Hypokalemia; Abdominal pain; Stroke [facility]); Diarrhea; Hematoma; Tachycardia; ARF (acute renal failure) (HCC); DJD (degenerative joint disease); HTN (hypertension); Acute metabolic encephalopathy; and UTI (urinary tract infection) on their problem list.  Date of Eval: 2022  ICD-10: Treatment Diagnosis: R13.12 Dysphagia, Oropharyngeal Phase    RECOMMENDATIONS   Diet: with 1:1 assistance  Diet Solids Recommendation:  (Full liquids)  Liquid Consistency Recommendation: Thin- by single controlled straw sips    Medications: Crushed in puree as able     Recommendations: Assistance with meals; Dysphagia treatment; Modified barium swallow study   Compensatory Swallowing Strategies:Small bites/sips;Assist feed;Upright as possible for all oral intake   Therapeutic Intervention:Patient/Family education;Diet tolerance monitoring   Patient continues to require skilled intervention: Yes  D/C Recommendations: Ongoing speech therapy is recommended during this hospitalization,Ongoing speech therapy is recommended at next level of care     ASSESSMENT    Dysphagia Diagnosis: Moderate oral stage dysphagia;Mild pharyngeal stage dysphagia  Patient with mild oral holding and piecemeal swallow with thin liquids.  Significantly delayed oral prep with pureed eggs and ground sausage. Slowed, but functional bolus prep with applesauce. Cough post swallow x2 with large sips of thin by straw and post swallow x1 with puree. No overt s/sx airway compromise with single straw sips of thin. Unable to hold or tilt cup without assistance. GENERAL    History of Present Injury/Illness: Ms. Jorge Lemos  has a past medical history of Arthritis, CAD (coronary artery disease), Chronic kidney disease, Chronic pain, Coagulation defects, Diabetes ([address] GERD (gastroesophageal reflux disease), Hypertension, Morbid obesity ([address] PE (pulmonary thromboembolism) ([address] Psychiatric disorder, Stroke ([address] Thromboembolus ([address] Thyroid disease, and Unspecified sleep apnea. . She also  has a past surgical history that includes Tonsillectomy (as teen); Adenoidectomy (as child); heent; Hysterectomy (1992); Cholecystectomy, laparoscopic; and other surgical history. Chart Reviewed: Yes  Subjective: Alert upright in bed. Minimally verbal- increased verbalizations and command following when sister arrived. Patient reads lips as she is [place]  Behavior/Cognition: Confused; Alert  Communication Observation:  (limited verbalizations. Speech is clear when she does verbalize)  Follows Directions:  (limited command following with model)  Respiratory Status: O2 via nasual cannula              Prior Dysphagia History: Patient's sister reports some coughing with thin liquids. Current Diet : Minced and Moist  Current Liquid Diet : Thin  Pain:   Patient does not appear in pain                                        OBJECTIVE    Patient Positioning: Upright in bed   Oral Motor   Labial: No impairment  Dentition: Edentulous  Dentition: Edentulous        Baseline Vocal Quality: Normal  Oropharyngeal Phase: Patient seen for diet tolerance. Consumed trials of thin by straw/serial sips, applesauce, pureed eggs, and ground sausage. Mild oral holding and piecemeal swallow with thin by straw. Patient unable to hold cup. Significantly prolonged oral prep with puree eggs and ground sausage with patient masticating bolus for over 5 minutes. Give multiple sips of liquid to clear oral cavity. Would not follow commands to open oral cavity to full assess clearance. Improved oral prep with applesauce. Cough post swallow x2 with thin by straw. Consumed ~ 4 oz thin by single controlled straw sips without overt s/sx airway compromise. Single occurrence of coughing post swallow with puree. PLAN    Duration/Frequency: Continue to follow patient 3x/week for duration of hospitalization and/or until goals met    Dysphagia Outcome and Severity Scale (GURPREET)  Dysphagia Outcome Severity Scale: Level 3: Moderate dysphagia- Total assisstance, supervision or strategies. Two or more diet consistencies restricted  Interpretation of Tool: The Dysphagia Outcome and Severity Scale (GURPREET) is a simple, easy-to-use, 7-point scale developed to systematically rate the functional severity of dysphagia based on objective assessment and make recommendations for diet level, independence level, and type of nutrition. Normal(7), Functional(6), Mild(5), Mild-Moderate(4), Moderate(3), Moderate-Severe(2), Severe(1)    Speech Therapy Prognosis  Prognosis: Good  Prognosis Considerations: Age; Co-Morbidities; Medical Prognosis    Education: RN  Patient Education: dysphagia, diet, positioning, aspiration risk  Patient Education Response: Demonstrated understanding    Current Medications:   No current facility-administered medications on file prior to encounter.      Current Outpatient Medications on File Prior to Encounter   Medication Sig Dispense Refill    ARIPiprazole (ABILIFY) 15 MG tablet Take 15 mg by mouth      aspirin 325 MG tablet Take 325 mg by mouth daily      Cetirizine HCl 10 MG CAPS Take by mouth      docusate (COLACE, DULCOLAX) 100 MG CAPS Take 100 mg by mouth 2 times daily      DULoxetine (CYMBALTA) 60 MG extended release capsule Take 60 mg by mouth      esomeprazole (NEXIUM) 40 MG delayed release capsule Take by mouth daily      fluconazole (DIFLUCAN) 200 MG tablet Take 200 mg by mouth 2 times daily      liothyronine (CYTOMEL) 25 MCG tablet Take 25 mcg by mouth daily      LORazepam (ATIVAN) 2 MG tablet Take 2 mg by mouth 2 times daily.  nitroGLYCERIN (NITROSTAT) 0.4 MG SL tablet Place 0.4 mg under the tongue      oxyCODONE (OXY-IR) 15 MG immediate release tablet Take 15 mg by mouth every 8 hours as needed.  pregabalin (LYRICA) 75 MG capsule Take by mouth.  rosuvastatin (CRESTOR) 40 MG tablet Take 20 mg by mouth      tolterodine (DETROL LA) 4 MG extended release capsule Take 4 mg by mouth daily      traMADol (ULTRAM) 50 MG tablet Take 50 mg by mouth every 6 hours as needed.  zolpidem (AMBIEN) 10 MG tablet Take by mouth.          PRECAUTIONS/ALLERGIES: Acetaminophen, Carisoprodol, Cephalexin, Cyclobenzaprine, Erythromycin, Gabapentin, Hydrocodone-acetaminophen, Metoclopramide, Penicillins, Povidone-iodine, Risperidone, Sulfa antibiotics, Codeine, and Oxycodone-acetaminophen   Safety Devices in place: Yes  Type of devices: Left in bed (Sister at bedside)    Therapy Time  SLP Individual Minutes  Time In: 0842  Time Out: Mandie Church 1950  Minutes: [address]  6/6/2022 9:49 AM

## 2022-06-07 ENCOUNTER — APPOINTMENT (OUTPATIENT)
Dept: GENERAL RADIOLOGY | Age: 67
DRG: 689 | End: 2022-06-07
Payer: MEDICARE

## 2022-06-07 LAB
ANION GAP SERPL CALC-SCNC: 11 MMOL/L (ref 7–16)
BUN SERPL-MCNC: 6 MG/DL (ref 8–23)
CALCIUM SERPL-MCNC: 8.4 MG/DL (ref 8.3–10.4)
CHLORIDE SERPL-SCNC: 109 MMOL/L (ref 98–107)
CO2 SERPL-SCNC: 20 MMOL/L (ref 21–32)
CREAT SERPL-MCNC: 0.4 MG/DL (ref 0.6–1)
GLUCOSE BLD STRIP.AUTO-MCNC: 117 MG/DL (ref 65–100)
GLUCOSE BLD STRIP.AUTO-MCNC: 79 MG/DL (ref 65–100)
GLUCOSE BLD STRIP.AUTO-MCNC: 91 MG/DL (ref 65–100)
GLUCOSE BLD STRIP.AUTO-MCNC: 92 MG/DL (ref 65–100)
GLUCOSE SERPL-MCNC: 101 MG/DL (ref 65–100)
MAGNESIUM SERPL-MCNC: 2.2 MG/DL (ref 1.8–2.4)
POTASSIUM SERPL-SCNC: 2.8 MMOL/L (ref 3.5–5.1)
SERVICE CMNT-IMP: ABNORMAL
SERVICE CMNT-IMP: NORMAL
SODIUM SERPL-SCNC: 140 MMOL/L (ref 136–145)

## 2022-06-07 PROCEDURE — 6360000002 HC RX W HCPCS: Performed by: HOSPITALIST

## 2022-06-07 PROCEDURE — 2580000003 HC RX 258: Performed by: HOSPITALIST

## 2022-06-07 PROCEDURE — 1100000000 HC RM PRIVATE

## 2022-06-07 PROCEDURE — 36415 COLL VENOUS BLD VENIPUNCTURE: CPT

## 2022-06-07 PROCEDURE — 83735 ASSAY OF MAGNESIUM: CPT

## 2022-06-07 PROCEDURE — 6370000000 HC RX 637 (ALT 250 FOR IP): Performed by: FAMILY MEDICINE

## 2022-06-07 PROCEDURE — 80048 BASIC METABOLIC PNL TOTAL CA: CPT

## 2022-06-07 PROCEDURE — 2580000003 HC RX 258: Performed by: EMERGENCY MEDICINE

## 2022-06-07 PROCEDURE — 2700000000 HC OXYGEN THERAPY PER DAY

## 2022-06-07 PROCEDURE — 51702 INSERT TEMP BLADDER CATH: CPT

## 2022-06-07 PROCEDURE — 6360000002 HC RX W HCPCS: Performed by: FAMILY MEDICINE

## 2022-06-07 PROCEDURE — 2580000003 HC RX 258: Performed by: FAMILY MEDICINE

## 2022-06-07 PROCEDURE — 6370000000 HC RX 637 (ALT 250 FOR IP): Performed by: INTERNAL MEDICINE

## 2022-06-07 PROCEDURE — C9113 INJ PANTOPRAZOLE SODIUM, VIA: HCPCS | Performed by: HOSPITALIST

## 2022-06-07 RX ORDER — POTASSIUM CHLORIDE 20 MEQ/1
40 TABLET, EXTENDED RELEASE ORAL
Status: DISCONTINUED | OUTPATIENT
Start: 2022-06-07 | End: 2022-06-14

## 2022-06-07 RX ADMIN — SODIUM CHLORIDE, PRESERVATIVE FREE 40 MG: 5 INJECTION INTRAVENOUS at 10:20

## 2022-06-07 RX ADMIN — ROSUVASTATIN CALCIUM 20 MG: 10 TABLET, FILM COATED ORAL at 21:05

## 2022-06-07 RX ADMIN — CEFTRIAXONE 1000 MG: 1 INJECTION, POWDER, FOR SOLUTION INTRAMUSCULAR; INTRAVENOUS at 17:01

## 2022-06-07 RX ADMIN — HEPARIN SODIUM 5000 UNITS: 5000 INJECTION INTRAVENOUS; SUBCUTANEOUS at 21:06

## 2022-06-07 RX ADMIN — POTASSIUM CHLORIDE 40 MEQ: 20 TABLET, EXTENDED RELEASE ORAL at 13:28

## 2022-06-07 RX ADMIN — CARBIDOPA AND LEVODOPA 2 TABLET: 25; 100 TABLET ORAL at 21:06

## 2022-06-07 RX ADMIN — TOPIRAMATE 50 MG: 50 TABLET ORAL at 10:18

## 2022-06-07 RX ADMIN — NYSTATIN 500000 UNITS: 100000 SUSPENSION ORAL at 10:44

## 2022-06-07 RX ADMIN — NYSTATIN 500000 UNITS: 100000 SUSPENSION ORAL at 17:01

## 2022-06-07 RX ADMIN — DIVALPROEX SODIUM 250 MG: 125 CAPSULE ORAL at 06:43

## 2022-06-07 RX ADMIN — DIVALPROEX SODIUM 250 MG: 125 CAPSULE ORAL at 13:27

## 2022-06-07 RX ADMIN — CARBOXYMETHYLCELLULOSE SODIUM 1 DROP: 10 GEL OPHTHALMIC at 10:21

## 2022-06-07 RX ADMIN — CARBOXYMETHYLCELLULOSE SODIUM 1 DROP: 10 GEL OPHTHALMIC at 19:30

## 2022-06-07 RX ADMIN — POTASSIUM CHLORIDE 40 MEQ: 20 TABLET, EXTENDED RELEASE ORAL at 10:19

## 2022-06-07 RX ADMIN — CARBIDOPA AND LEVODOPA 2 TABLET: 25; 100 TABLET ORAL at 10:18

## 2022-06-07 RX ADMIN — ASPIRIN 325 MG ORAL TABLET 325 MG: 325 PILL ORAL at 10:18

## 2022-06-07 RX ADMIN — SODIUM CHLORIDE, PRESERVATIVE FREE 3 ML: 5 INJECTION INTRAVENOUS at 10:20

## 2022-06-07 RX ADMIN — LEVOTHYROXINE SODIUM 25 MCG: 0.05 TABLET ORAL at 06:43

## 2022-06-07 RX ADMIN — HEPARIN SODIUM 5000 UNITS: 5000 INJECTION INTRAVENOUS; SUBCUTANEOUS at 13:28

## 2022-06-07 RX ADMIN — NYSTATIN 500000 UNITS: 100000 SUSPENSION ORAL at 13:28

## 2022-06-07 RX ADMIN — HEPARIN SODIUM 5000 UNITS: 5000 INJECTION INTRAVENOUS; SUBCUTANEOUS at 06:44

## 2022-06-07 RX ADMIN — SODIUM CHLORIDE, PRESERVATIVE FREE 10 ML: 5 INJECTION INTRAVENOUS at 10:21

## 2022-06-07 RX ADMIN — CARBIDOPA AND LEVODOPA 2 TABLET: 25; 100 TABLET ORAL at 13:28

## 2022-06-07 RX ADMIN — SODIUM CHLORIDE, PRESERVATIVE FREE 3 ML: 5 INJECTION INTRAVENOUS at 17:13

## 2022-06-07 RX ADMIN — TRAZODONE HYDROCHLORIDE 25 MG: 50 TABLET ORAL at 21:06

## 2022-06-07 RX ADMIN — DIVALPROEX SODIUM 250 MG: 125 CAPSULE ORAL at 21:05

## 2022-06-07 RX ADMIN — NYSTATIN 500000 UNITS: 100000 SUSPENSION ORAL at 21:10

## 2022-06-07 RX ADMIN — POTASSIUM CHLORIDE 40 MEQ: 20 TABLET, EXTENDED RELEASE ORAL at 17:01

## 2022-06-07 RX ADMIN — ARIPIPRAZOLE 15 MG: 5 TABLET ORAL at 10:19

## 2022-06-07 RX ADMIN — DULOXETINE HYDROCHLORIDE 120 MG: 30 CAPSULE, DELAYED RELEASE ORAL at 10:18

## 2022-06-07 RX ADMIN — SODIUM CHLORIDE, PRESERVATIVE FREE 10 ML: 5 INJECTION INTRAVENOUS at 21:06

## 2022-06-07 ASSESSMENT — PAIN SCALES - WONG BAKER: WONGBAKER_NUMERICALRESPONSE: 0

## 2022-06-07 NOTE — PROGRESS NOTES
SPEECH PATHOLOGY NOTE:    Modified barium swallow study (MBSS) scheduled for this AM; however, patient sleeping soundly and did not wake to name or tactile cues. RN notified. Will re-attempt MBSS tomorrow.          Rc Horta MS, CCC-SLP

## 2022-06-07 NOTE — PROGRESS NOTES
Pt is more alert this afternoon. Pt is up in bed watching t.v and verbally responding to nurse. Pt reads lips due to hearing impairment. Pt's sister notified nurse that she sleeps more during the late morning due to being awake at night so she would be more alert to do the swallow study in the afternoon rather than morning.

## 2022-06-07 NOTE — PROGRESS NOTES
Hospitalist Progress Note   Admit Date:  6/3/2022 10:16 AM   Name:  Donna Watson   Age:  61 y.o. Sex:  female  :  1955   MRN:  [id number]   Room:  [facility]/    Presenting Complaint: Altered Mental Status     Reason(s) for Admission: Delirium [R41.0]  Acute febrile illness [Q30.4]  Acute metabolic encephalopathy [W33.31]     Hospital Course & Interval History:     Patient with past medical history of  Previous CVA with residual left-sided weakness  Bed-bound  Patient has no teeth but able to eat regular food. CAD  Type 2 diabetes mellitus   history of pulmonary embolism, not on anticoagulation now. Anxiety  Hypothyroidism  Chronic pain  Parkinson disease    Patient was brought to hospital from nursing facility due to change in mental status. Normally patient could speak without issues. No detailed history is available. Patient was found to have elevated blood pressure, with heart rate of 111/min. Potassium was low, procalcitonin was low. Viral panel was negative. UA is positive for nitrites and 4+ bacteria, no white blood cells however. CT head and chest x-ray showed no significant acute findings. Patient was admitted due to change in mental status and for treatment of UTI. Subjective/24hr Events (22):    22   Patient is lying in bed and opens eyes when she is called and stimulated. She does not answer questions. No fever. No shaking. No chills. No report of shortness of breath. No report of chest pain. No diarrhea. No blood per rectum. No blood per urine. 22   Patient is mostly lying in bed and not talking. She does not respond to verbal stimuli. No fever in the last 24 hours. No chest pain. No shortness of breath. Patient is assessed by speech therapist and is allowed to have minced and moist diet now. 22   Patient is lying in bed and does not respond much verbally to stimuli. No fever. No shaking. No chills.    No shortness of breath. 6/7/22   Patient seems to open eyes more easily when she is called. No fever. No shaking. No chills. No chest pain. No shortness of breath. Assessment & Plan:     Principal Problem:    Acute metabolic encephalopathy    UTI (urinary tract infection)  Patient is on empiric ceftriaxone 1 g every 24 hours. Blood culture is negative. Urine culture grew E.coli which is pan-sensitive. Speech therapist deems patient suitable for minced and moist diet. Will allow this. Mentation does not improve much although she seems more responsive to stimuli more. Active Problems:    Diabetes mellitus ([address]  Monitor blood sugar. Cover with insulin sliding scale accordingly. Blood sugar is mostly in 100s ranges. That is acceptable. RYAN (obstructive sleep apnea)  Noted       Hypokalemia  Continues to have hypokalemia   Aggressive potassium supplementation    Stroke [facility])  Previous stroke  Continue with aspirin, rosuvastatin. HTN (hypertension)  Blood pressure is elevated sometimes. Will monitor closely. May need to start antihypertensive medication gingerly. On PRN medication, Hydralazine. Discharge Planning:    Likely can go back to long-term [facility] facility where she came from when she is improved. Bed is held until June 12, 2022. Diet:  ADULT DIET; Full Liquid  DVT PPx: heparin SC   Code status: Full Code    Hospital Problems:  Principal Problem:    Acute metabolic encephalopathy  Active Problems:    UTI (urinary tract infection)    Diabetes mellitus (HCC)    RYAN (obstructive sleep apnea)    Hypokalemia    Stroke ([facility]    HTN (hypertension)  Resolved Problems:    * No resolved hospital problems.  *      Objective:     Patient Vitals for the past 24 hrs:   Temp Pulse Resp BP SpO2   06/07/22 0735 98.8 °F (37.1 °C) (!) 123 16 (!) 161/137 97 %   06/07/22 0502 98.8 °F (37.1 °C) (!) 110 18 (!) 146/79 98 %   06/07/22 0011 97.7 °F (36.5 °C) (!) 107 18 (!) 141/87 99 % 06/06/22 2001 97.7 °F (36.5 °C) (!) 109 18 (!) 155/94 100 %   06/06/22 1730 -- 92 -- -- --   06/06/22 1622 97.1 °F (36.2 °C) (!) 101 18 (!) 149/99 96 %   06/06/22 1128 97.5 °F (36.4 °C) (!) 104 18 123/69 98 %       Oxygen Therapy  SpO2: 97 %  Pulse Oximeter Device Mode: Intermittent  O2 Device: Nasal cannula  O2 Flow Rate (L/min): 2 L/min  O2 Delivery Method: Nasal cannula    Estimated body mass index is 27.59 kg/m² as calculated from the following:    Height as of this encounter: 5' 4\" (1.626 m). Weight as of this encounter: 160 lb 11.5 oz (72.9 kg). Intake/Output Summary (Last 24 hours) at 6/7/2022 1121  Last data filed at 6/7/2022 0502  Gross per 24 hour   Intake 2190 ml   Output 1000 ml   Net 1190 ml         Physical Exam:     Blood pressure (!) 161/137, pulse (!) 123, temperature 98.8 °F (37.1 °C), temperature source Oral, resp. rate 16, height 5' 4\" (1.626 m), weight 160 lb 11.5 oz (72.9 kg), SpO2 97 %. General:    Well nourished. Patient is lying flat quietly in bed. She opens eyes when she is called. She does not answer questions. She does not answer her name when she was asked about it. She seems to open eyes more easily when she is called. Head:  Normocephalic, atraumatic, not pale. No icterus. Eyes:  Sclerae appear normal.  Pupils equally round. ENT:  Nares appear normal, no drainage. Moist oral mucosa  Neck:  No restricted ROM. Trachea midline   CV:   RRR. No m/r/g. No jugular venous distension. Lungs:   CTAB. No wheezing, rhonchi, or rales. Respirations even, unlabored  Abdomen: Bowel sounds present. Soft, nontender, nondistended. Extremities: No cyanosis or clubbing. No edema  Skin:     No rashes and normal coloration. Warm and dry. Neuro:  As mentioned above, patient responded minimally to verbal stimuli. Does not seem to have localized weakness at this time. No spasticity of her extremities.        I have reviewed ordered lab tests and independently visualized imaging below:    Recent Labs:  Recent Results (from the past 48 hour(s))   POCT Glucose    Collection Time: 06/05/22 12:59 PM   Result Value Ref Range    POC Glucose 110 (H) 65 - 100 mg/dL    Performed by: [facility]    POCT Glucose    Collection Time: 06/05/22  4:30 PM   Result Value Ref Range    POC Glucose 109 (H) 65 - 100 mg/dL    Performed by: [facility]ri[facility]ollyP[facility]    POCT Glucose    Collection Time: 06/06/22 12:53 AM   Result Value Ref Range    POC Glucose 113 (H) 65 - 100 mg/dL    Performed by:  Tracy    POCT Glucose    Collection Time: 06/06/22  5:08 AM   Result Value Ref Range    POC Glucose 96 65 - 100 mg/dL    Performed by: Billy Marrero    Basic Metabolic Panel    Collection Time: 06/06/22  9:23 AM   Result Value Ref Range    Sodium 136 136 - 145 mmol/L    Potassium 3.0 (L) 3.5 - 5.1 mmol/L    Chloride 106 98 - 107 mmol/L    CO2 21 21 - 32 mmol/L    Anion Gap 9 7 - 16 mmol/L    Glucose 122 (H) 65 - 100 mg/dL    BUN 9 8 - 23 MG/DL    CREATININE 0.50 (L) 0.6 - 1.0 MG/DL    GFR African American >60 >60 ml/min/1.73m2    GFR Non- >60 >60 ml/min/1.73m2    Calcium 8.5 8.3 - 10.4 MG/DL   POCT Glucose    Collection Time: 06/06/22 11:44 AM   Result Value Ref Range    POC Glucose 105 (H) 65 - 100 mg/dL    Performed by: [facility]    POCT Glucose    Collection Time: 06/06/22  5:38 PM   Result Value Ref Range    POC Glucose 108 (H) 65 - 100 mg/dL    Performed by: [facility]    POCT Glucose    Collection Time: 06/06/22 11:45 PM   Result Value Ref Range    POC Glucose 91 65 - 100 mg/dL    Performed by: Magnus    Basic Metabolic Panel    Collection Time: 06/07/22  4:23 AM   Result Value Ref Range    Sodium 140 136 - 145 mmol/L    Potassium 2.8 (L) 3.5 - 5.1 mmol/L    Chloride 109 (H) 98 - 107 mmol/L    CO2 20 (L) 21 - 32 mmol/L    Anion Gap 11 7 - 16 mmol/L    Glucose 101 (H) 65 - 100 mg/dL    BUN 6 (L) 8 - 23 MG/DL    CREATININE 0.40 (L) 0.6 - 1.0 MG/DL    GFR  >60 >60 ml/min/1.73m2    GFR Non- >60 >60 ml/min/1.73m2    Calcium 8.4 8.3 - 10.4 MG/DL   Magnesium    Collection Time: 06/07/22  4:23 AM   Result Value Ref Range    Magnesium 2.2 1.8 - 2.4 mg/dL   POCT Glucose    Collection Time: 06/07/22  5:55 AM   Result Value Ref Range    POC Glucose 92 65 - 100 mg/dL    Performed by: Hortencia Roberts        Other Studies:  CT HEAD WO CONTRAST    Result Date: 6/3/2022  CT of the head without contrast. CLINICAL INDICATION: Lethargy, altered mental status PROCEDURE: Serial thin section axial images are obtained from the cranial vertex through the skull base without the administration of intravenous contrast. Radiation dose reduction techniques were used for this study. Our CT scanners use one or all of the following: Automated exposure control, adjusted of the mA and/or kV according to patient size, iterative reconstruction COMPARISON: Head CT dated 2/15/2013. FINDINGS: There is no acute intracranial hemorrhage, mass, or mass effect. No abnormal extra-axial fluid collections identified. There is no hydrocephalus. The basilar cisterns are widely patent. Extensive bilateral patchy white matter hypoattenuation again noted. It is unchanged. No findings present to indicate large, cortical-based territorial infarction. There is a small old right basal ganglia lacunar infarct. No skull fracture or aggressive osseous lesion noted. Thick mucoperiosteal thickening is noted in the right maxillary sinus. The mastoid air cells are clear. 1. No acute intracranial abnormality. 2. Extensive bilateral white matter hypoattenuation most in keeping with chronic microangiopathic disease. 3. Chronic appearing mucoperiosteal thickening in the right maxillary sinus. XR CHEST PORTABLE    Result Date: 6/3/2022  Portable chest x-ray CLINICAL INDICATION: Altered mental status.  FINDINGS: Single AP view the chest compared to a similar exam dated 8/17/2006 shows mild subsegmental atelectasis in the right midlung. Otherwise the lung parenchyma is clear. There is no pleural effusion or pneumothorax. The cardiac silhouette and mediastinum are unremarkable. Mild subsegmental atelectasis in the right midlung. Otherwise no acute abnormality.       Current Meds:  Current Facility-Administered Medications   Medication Dose Route Frequency    potassium chloride (KLOR-CON M) extended release tablet 40 mEq  40 mEq Oral TID     nystatin (MYCOSTATIN) 003210 UNIT/ML suspension 500,000 Units  5 mL Oral 4x Daily    aspirin tablet 325 mg  325 mg Oral Daily    rosuvastatin (CRESTOR) tablet 20 mg  20 mg Oral Nightly    0.9 % sodium chloride infusion   IntraVENous Continuous    insulin lispro (HUMALOG) injection vial 0-8 Units  0-8 Units SubCUTAneous Q6H    glucose chewable tablet 16 g  4 tablet Oral PRN    dextrose bolus 10% 125 mL  125 mL IntraVENous PRN    Or    dextrose bolus 10% 250 mL  250 mL IntraVENous PRN    glucagon injection 1 mg  1 mg IntraMUSCular PRN    dextrose 5 % solution  100 mL/hr IntraVENous PRN    metoprolol (LOPRESSOR) injection 5 mg  5 mg IntraVENous Q6H PRN    sodium chloride flush 0.9 % injection 3 mL  3 mL IntraVENous Q8H    DULoxetine (CYMBALTA) extended release capsule 120 mg  120 mg Oral Daily    levothyroxine (SYNTHROID) tablet 25 mcg  25 mcg Oral Daily    carbidopa-levodopa (SINEMET)  MG per tablet 2 tablet  2 tablet Oral TID    carboxymethylcellulose (THERATEARS) 1 % ophthalmic gel 1 drop  1 drop Both Eyes Q12H    ARIPiprazole (ABILIFY) tablet 15 mg  15 mg Oral Daily    topiramate (TOPAMAX) tablet 50 mg  50 mg Oral Daily    divalproex (DEPAKOTE SPRINKLE) capsule 250 mg  250 mg Oral 3 times per day    traZODone (DESYREL) tablet 25 mg  25 mg Oral Nightly    ketorolac (TORADOL) tablet 10 mg  10 mg Oral Q6H PRN    sodium chloride flush 0.9 % injection 5-40 mL  5-40 mL IntraVENous PRN    cefTRIAXone (ROCEPHIN) 1000 mg IVPB in NS 50ml minibag  1,000

## 2022-06-08 ENCOUNTER — APPOINTMENT (OUTPATIENT)
Dept: GENERAL RADIOLOGY | Age: 67
DRG: 689 | End: 2022-06-08
Payer: MEDICARE

## 2022-06-08 LAB
ANION GAP SERPL CALC-SCNC: 6 MMOL/L (ref 7–16)
BACTERIA SPEC CULT: NORMAL
BACTERIA SPEC CULT: NORMAL
BUN SERPL-MCNC: 2 MG/DL (ref 8–23)
CALCIUM SERPL-MCNC: 8.5 MG/DL (ref 8.3–10.4)
CHLORIDE SERPL-SCNC: 114 MMOL/L (ref 98–107)
CO2 SERPL-SCNC: 21 MMOL/L (ref 21–32)
CREAT SERPL-MCNC: 0.3 MG/DL (ref 0.6–1)
GLUCOSE BLD STRIP.AUTO-MCNC: 108 MG/DL (ref 65–100)
GLUCOSE BLD STRIP.AUTO-MCNC: 109 MG/DL (ref 65–100)
GLUCOSE BLD STRIP.AUTO-MCNC: 130 MG/DL (ref 65–100)
GLUCOSE BLD STRIP.AUTO-MCNC: 89 MG/DL (ref 65–100)
GLUCOSE BLD STRIP.AUTO-MCNC: 95 MG/DL (ref 65–100)
GLUCOSE BLD STRIP.AUTO-MCNC: 97 MG/DL (ref 65–100)
GLUCOSE SERPL-MCNC: 92 MG/DL (ref 65–100)
MM INDURATION, POC: 0 MM (ref 0–5)
POTASSIUM SERPL-SCNC: 4.2 MMOL/L (ref 3.5–5.1)
PPD, POC: NEGATIVE
SERVICE CMNT-IMP: ABNORMAL
SERVICE CMNT-IMP: NORMAL
SODIUM SERPL-SCNC: 141 MMOL/L (ref 136–145)

## 2022-06-08 PROCEDURE — 36415 COLL VENOUS BLD VENIPUNCTURE: CPT

## 2022-06-08 PROCEDURE — 6370000000 HC RX 637 (ALT 250 FOR IP): Performed by: FAMILY MEDICINE

## 2022-06-08 PROCEDURE — 6360000002 HC RX W HCPCS: Performed by: HOSPITALIST

## 2022-06-08 PROCEDURE — 1100000000 HC RM PRIVATE

## 2022-06-08 PROCEDURE — 51702 INSERT TEMP BLADDER CATH: CPT

## 2022-06-08 PROCEDURE — C9113 INJ PANTOPRAZOLE SODIUM, VIA: HCPCS | Performed by: HOSPITALIST

## 2022-06-08 PROCEDURE — 82962 GLUCOSE BLOOD TEST: CPT

## 2022-06-08 PROCEDURE — 2500000003 HC RX 250 WO HCPCS: Performed by: INTERNAL MEDICINE

## 2022-06-08 PROCEDURE — 6370000000 HC RX 637 (ALT 250 FOR IP): Performed by: INTERNAL MEDICINE

## 2022-06-08 PROCEDURE — 2580000003 HC RX 258: Performed by: INTERNAL MEDICINE

## 2022-06-08 PROCEDURE — 2580000003 HC RX 258: Performed by: EMERGENCY MEDICINE

## 2022-06-08 PROCEDURE — 74230 X-RAY XM SWLNG FUNCJ C+: CPT

## 2022-06-08 PROCEDURE — 80048 BASIC METABOLIC PNL TOTAL CA: CPT

## 2022-06-08 PROCEDURE — 6360000002 HC RX W HCPCS: Performed by: FAMILY MEDICINE

## 2022-06-08 PROCEDURE — 2580000003 HC RX 258: Performed by: HOSPITALIST

## 2022-06-08 PROCEDURE — 92611 MOTION FLUOROSCOPY/SWALLOW: CPT

## 2022-06-08 PROCEDURE — 2580000003 HC RX 258: Performed by: FAMILY MEDICINE

## 2022-06-08 RX ORDER — PANTOPRAZOLE SODIUM 40 MG/1
40 TABLET, DELAYED RELEASE ORAL
Status: DISCONTINUED | OUTPATIENT
Start: 2022-06-09 | End: 2022-06-14 | Stop reason: HOSPADM

## 2022-06-08 RX ORDER — INSULIN LISPRO 100 [IU]/ML
0-4 INJECTION, SOLUTION INTRAVENOUS; SUBCUTANEOUS NIGHTLY
Status: DISCONTINUED | OUTPATIENT
Start: 2022-06-08 | End: 2022-06-14 | Stop reason: HOSPADM

## 2022-06-08 RX ORDER — INSULIN LISPRO 100 [IU]/ML
0-4 INJECTION, SOLUTION INTRAVENOUS; SUBCUTANEOUS
Status: DISCONTINUED | OUTPATIENT
Start: 2022-06-08 | End: 2022-06-14 | Stop reason: HOSPADM

## 2022-06-08 RX ADMIN — CARBOXYMETHYLCELLULOSE SODIUM 1 DROP: 10 GEL OPHTHALMIC at 20:59

## 2022-06-08 RX ADMIN — SODIUM CHLORIDE: 900 INJECTION, SOLUTION INTRAVENOUS at 05:01

## 2022-06-08 RX ADMIN — CARBIDOPA AND LEVODOPA 2 TABLET: 25; 100 TABLET ORAL at 21:04

## 2022-06-08 RX ADMIN — CARBOXYMETHYLCELLULOSE SODIUM 1 DROP: 10 GEL OPHTHALMIC at 09:40

## 2022-06-08 RX ADMIN — NYSTATIN 500000 UNITS: 100000 SUSPENSION ORAL at 09:40

## 2022-06-08 RX ADMIN — CARBIDOPA AND LEVODOPA 2 TABLET: 25; 100 TABLET ORAL at 09:40

## 2022-06-08 RX ADMIN — SODIUM CHLORIDE, PRESERVATIVE FREE 3 ML: 5 INJECTION INTRAVENOUS at 09:41

## 2022-06-08 RX ADMIN — SODIUM CHLORIDE, PRESERVATIVE FREE 10 ML: 5 INJECTION INTRAVENOUS at 21:01

## 2022-06-08 RX ADMIN — DULOXETINE HYDROCHLORIDE 120 MG: 30 CAPSULE, DELAYED RELEASE ORAL at 09:39

## 2022-06-08 RX ADMIN — TOPIRAMATE 50 MG: 50 TABLET ORAL at 09:40

## 2022-06-08 RX ADMIN — BARIUM SULFATE 30 ML: 980 POWDER, FOR SUSPENSION ORAL at 10:37

## 2022-06-08 RX ADMIN — HEPARIN SODIUM 5000 UNITS: 5000 INJECTION INTRAVENOUS; SUBCUTANEOUS at 15:09

## 2022-06-08 RX ADMIN — NYSTATIN 500000 UNITS: 100000 SUSPENSION ORAL at 21:00

## 2022-06-08 RX ADMIN — BARIUM SULFATE 15 ML: 400 PASTE ORAL at 10:37

## 2022-06-08 RX ADMIN — DIVALPROEX SODIUM 250 MG: 125 CAPSULE ORAL at 20:59

## 2022-06-08 RX ADMIN — SODIUM CHLORIDE, PRESERVATIVE FREE 40 MG: 5 INJECTION INTRAVENOUS at 09:40

## 2022-06-08 RX ADMIN — NYSTATIN 500000 UNITS: 100000 SUSPENSION ORAL at 19:22

## 2022-06-08 RX ADMIN — CARBIDOPA AND LEVODOPA 2 TABLET: 25; 100 TABLET ORAL at 15:08

## 2022-06-08 RX ADMIN — ROSUVASTATIN CALCIUM 20 MG: 10 TABLET, FILM COATED ORAL at 20:59

## 2022-06-08 RX ADMIN — BARIUM SULFATE 30 ML: 400 SUSPENSION ORAL at 10:36

## 2022-06-08 RX ADMIN — SODIUM CHLORIDE, PRESERVATIVE FREE 10 ML: 5 INJECTION INTRAVENOUS at 09:41

## 2022-06-08 RX ADMIN — ASPIRIN 325 MG ORAL TABLET 325 MG: 325 PILL ORAL at 09:39

## 2022-06-08 RX ADMIN — TRAZODONE HYDROCHLORIDE 25 MG: 50 TABLET ORAL at 20:59

## 2022-06-08 RX ADMIN — ARIPIPRAZOLE 15 MG: 5 TABLET ORAL at 09:39

## 2022-06-08 RX ADMIN — SODIUM CHLORIDE: 900 INJECTION, SOLUTION INTRAVENOUS at 23:46

## 2022-06-08 RX ADMIN — HEPARIN SODIUM 5000 UNITS: 5000 INJECTION INTRAVENOUS; SUBCUTANEOUS at 21:00

## 2022-06-08 RX ADMIN — HEPARIN SODIUM 5000 UNITS: 5000 INJECTION INTRAVENOUS; SUBCUTANEOUS at 05:02

## 2022-06-08 RX ADMIN — NYSTATIN 500000 UNITS: 100000 SUSPENSION ORAL at 15:08

## 2022-06-08 RX ADMIN — DIVALPROEX SODIUM 250 MG: 125 CAPSULE ORAL at 15:08

## 2022-06-08 ASSESSMENT — PAIN SCALES - WONG BAKER
WONGBAKER_NUMERICALRESPONSE: 0

## 2022-06-08 NOTE — PROGRESS NOTES
Hourly rounds completed this shift. All needs met at this time. Bed low/locked. Call light within reach. Will continue to monitor and give bedside report to oncoming nurse. Pt refused AM meds. Turned and repositioned with wedge throughout shift. Still alert but nonverbal and just stares while talking to her.

## 2022-06-08 NOTE — PROGRESS NOTES
SPEECH LANGUAGE PATHOLOGY NOTE      Modified barium swallow study complete. Recommend pureed diet and mildly thick liquids via straw sip. Discussed recommendations with RN. Full detailed report to follow.         [address] CCC-SLP

## 2022-06-08 NOTE — PROGRESS NOTES
LTG: Patient will tolerate least restrictive oral diet without overt s/sx of airway compromise. STG: Patient will tolerate  full liquids/thin liquids without overt s/sx of airway compromise. Downgraded . Discontinued   STG: Patient will tolerate single sips of thin liquid/water via straw for pleasure with 1:1 assistance after oral care. Added    STG: Patient will tolerate pureed diet and mildly thick liquids without overt s/sx aspiration. Added   STG: Patient will participate in modified barium swallow study for objective assessment of oropharyngeal swallow function. Added , MET   STG pt will participate in a full ST evaluation if indicated.     SPEECH LANGUAGE PATHOLOGY: MODIFIED BARIUM SWALLOW STUDY Initial Assessment  NAME: Earnest Doty  : 1955  MRN: [id number]    ADMISSION DATE: 6/3/2022  ADMITTING DIAGNOSIS: has Diabetes mellitus ([facility] Nausea; Coagulopathy ([facility] RYAN (obstructive sleep apnea); Syncope and collapse; Anemia, unspecified; Hypokalemia; Abdominal pain; Stroke [facility]); Diarrhea; Hematoma; Tachycardia; ARF (acute renal failure) (HCC); DJD (degenerative joint disease); HTN (hypertension);  Acute metabolic encephalopathy; and UTI (urinary tract infection) on their problem list.  Date of Eval: 2022    ICD-10: Treatment Diagnosis: R13.12 Dysphagia, Oropharyngeal Phase     RECOMMENDATIONS   Diet:  Solid consistency: Pureed  Liquid consistency: Mildly Thick                Liquid administration via: Straw    Medications: Meds in puree     Additional Recommendations:  Supervision: 1:1  Compensatory Swallowing Strategies : Remain upright for 30-45 minutes after meals,Upright as possible for all oral intake,Eat/Feed slowly,Small bites/sips   Sips of thin water via straw for pleasure after oral care   Interventions/Recommendations/Referrals  Therapeutic Interventions: Diet tolerance monitoring,Patient/Family education,Therapeutic PO trials with SLP   Patient continues to require skilled intervention: Yes  Ongoing speech therapy is recommended during this hospitalization,Ongoing speech therapy is recommended at next level of care     ASSESSMENT    Dysphagia Diagnosis: Moderate oral stage dysphagia,Mild pharyngeal stage dysphagia    Patient demonstrates slowed lingual movement, mildly reduced tongue base retraction, delayed swallow initiation,decreased soft palate elevation with mild escape to nasopharynx with liquids, and reduced pharyngeal constriction. Inconsistent trace penetration with thin and mildly thick liquids, however occasionally penetration is non clearing with thin liquids. Increased pharyngeal residue with thin liquids and patient does not spontaneously elicit double swallow or follow commands to do so. Spontaneous double swallow and only mild pharyngeal residue with mildly thick liquids and pudding. GENERAL    General  Chart Reviewed: Yes  Behavior/Cognition: Lethargic,Cooperative,Doesn't follow directions  Pain:   Patient does not appear in pain         Dysphagia History   patient consumes regular diet at baseline. current diet:   Diet Solids Recommendation:  (Full liquids)  and Liquid Consistency Recommendation: Thin  Patient complaints: none    Patient complaints: none  Referring Diagnosis: dysphagia, encephalopathy    History of Present Injury/Illness: Ms. Bethany Zendejas  has a past medical history of Arthritis, CAD (coronary artery disease), Chronic kidney disease, Chronic pain, Coagulation defects, Diabetes ([address] GERD (gastroesophageal reflux disease), Hypertension, Morbid obesity ([address] PE (pulmonary thromboembolism) ([address] Psychiatric disorder, Stroke ([address] Thromboembolus ([address] Thyroid disease, and Unspecified sleep apnea. . She also  has a past surgical history that includes Tonsillectomy (as teen); Adenoidectomy (as child); heent; Hysterectomy (1992); Cholecystectomy, laparoscopic; and other surgical history.   OBJECTIVE    Oral Hygiene: [address] [facility] [address] Comments: adequate  Dentition: Edentulous  Labial: Decreased seal  Lingual: Decreased strength  Baseline Vocal Quality: WFL, min verbalizations. Swallowing Study Trial  Type of Study: Modified barium swallow  Film Views: C-Arm     PO trials  Patient was presented with trials of thin liquids (by spoon and straw sip), mildly-thick liquids (by spoon and straw sip) and pudding. Oral phase:   lingual pumping   reduced oral containment   slowed lingual motion   reduced posterior tongue elevation   premature spillage to pharynx pre-swallow   reduced soft palate elevation with mild escape to nasopharynx with liquids    Pharyngeal phase:   Swallows of thin liquids  o Tsp: premature spillage to pyriforms. No penetration on initial tsp. There was trace laryngeal penetration observed before the swallow on second trial via tsp, in which material entered the laryngeal vestibule, reached vocal cords, and no attempt to eject. On third tsp trial, flash shallow penetration during the swallow.   o Straw: premature spillage to pyriforms without pooling. Occasional trace shallow flash penetration during the swallow. Diffuse mild pharyngeal residue and Mild-moderate valleculae residue without spontaneous double swallow and patient did not respond to verbal cues for double swallow. When provided additional straw sips, patient demonstrated eventual trace non clearing shallow penetration of pharyngeal residue.  Swallows of mildly-thick liquids   o Tsp: initiated at base of tongue. No penetration/aspiration. Mild base of tongue and valleculae residue.   o Straw: premature spillage to pyriforms without pooling. Occasional trace shallow flash penetration during the swallow. Mild valleculae residue, and patient Independently double swallows with thicker viscosity.  Swallows of pudding were triggered at valleculae. mild oral, tongue base, valleculae residue.  Chewables deferred due to level of alertness and oral delays. Penetration-Aspiration Scale (PAS): 5 - Material enters the airway, contacts the vocal folds, and is not ejected. With thin via tsp on single occasion only, material reached vocal cords and no attempts to clear. Upper Esophageal Screen: appeared [place] given limited view   *Distal esophagus not assessed due to limitations of modified barium swallow study. PLAN    Duration/Frequency: Continue to follow patient 3x/week for duration of hospitalization and/or until goals met    Dysphagia Outcome and Severity Scale (GURPREET)  Dysphagia Outcome Severity Scale: Level 3: Moderate dysphagia- Total assisstance, supervision or strategies. Two or more diet consistencies restricted  Interpretation of Tool: The Dysphagia Outcome and Severity Scale (GURPREET) is a simple, easy-to-use, 7-point scale developed to systematically rate the functional severity of dysphagia based on objective assessment and make recommendations for diet level, independence level, and type of nutrition. Normal(7), Functional(6), Mild(5), Mild-Moderate(4), Moderate(3), Moderate-Severe(2), Severe(1)    Speech Therapy Prognosis  Prognosis: Good  Prognosis Considerations: Medical Prognosis,Co-Morbidities,Family/Community Support  Education:  Patient Education: dysphagia, diet, positioning, aspiration risk  Patient Education Response: No evidence of learning    Current Medications:   No current facility-administered medications on file prior to encounter.      Current Outpatient Medications on File Prior to Encounter   Medication Sig Dispense Refill    ARIPiprazole (ABILIFY) 15 MG tablet Take 15 mg by mouth      aspirin 325 MG tablet Take 325 mg by mouth daily      Cetirizine HCl 10 MG CAPS Take by mouth      docusate (COLACE, DULCOLAX) 100 MG CAPS Take 100 mg by mouth 2 times daily      DULoxetine (CYMBALTA) 60 MG extended release capsule Take 60 mg by mouth      esomeprazole (NEXIUM) 40 MG delayed release capsule Take by mouth daily      fluconazole (DIFLUCAN) 200 MG tablet Take 200 mg by mouth 2 times daily      liothyronine (CYTOMEL) 25 MCG tablet Take 25 mcg by mouth daily      LORazepam (ATIVAN) 2 MG tablet Take 2 mg by mouth 2 times daily.  nitroGLYCERIN (NITROSTAT) 0.4 MG SL tablet Place 0.4 mg under the tongue      oxyCODONE (OXY-IR) 15 MG immediate release tablet Take 15 mg by mouth every 8 hours as needed.  pregabalin (LYRICA) 75 MG capsule Take by mouth.  rosuvastatin (CRESTOR) 40 MG tablet Take 20 mg by mouth      tolterodine (DETROL LA) 4 MG extended release capsule Take 4 mg by mouth daily      traMADol (ULTRAM) 50 MG tablet Take 50 mg by mouth every 6 hours as needed.  zolpidem (AMBIEN) 10 MG tablet Take by mouth.          PRECAUTIONS/ALLERGIES: Acetaminophen, Carisoprodol, Cephalexin, Cyclobenzaprine, Erythromycin, Gabapentin, Hydrocodone-acetaminophen, Metoclopramide, Penicillins, Povidone-iodine, Risperidone, Sulfa antibiotics, Codeine, and Oxycodone-acetaminophen     SAFETY: Safety Devices in place: Yes (patient in stretcher in holding bay awaiting transport)      Therapy Time  SLP Individual Minutes  Time In: 1030  Time Out: [id number] [address]  Minutes: [facility]  6/8/2022 11:29 AM

## 2022-06-08 NOTE — PROGRESS NOTES
Nurse called and spoke with speech therapist. Pt does not need to be NPO for morning procedure. PO meds administered crushed in pudding. Pt tolerated well. Wet cough noted after med administration.

## 2022-06-08 NOTE — PLAN OF CARE
Problem: Discharge Planning  Goal: Discharge to home or other facility with appropriate resources  Outcome: Progressing  Flowsheets (Taken 6/7/2022 1214 by Eleanor Ascencio RN)  Discharge to home or other facility with appropriate resources:   Identify barriers to discharge with patient and caregiver   Identify discharge learning needs (meds, wound care, etc)   Refer to discharge planning if patient needs post-hospital services based on physician order or complex needs related to functional status, cognitive ability or social support system   Arrange for needed discharge resources and transportation as appropriate   Arrange for interpreters to assist at discharge as needed     Problem: Pain  Goal: Verbalizes/displays adequate comfort level or baseline comfort level  Outcome: Progressing     Problem: Skin/Tissue Integrity  Goal: Absence of new skin breakdown  Description: 1. Monitor for areas of redness and/or skin breakdown  2. Assess vascular access sites hourly  3. Every 4-6 hours minimum:  Change oxygen saturation probe site  4. Every 4-6 hours:  If on nasal continuous positive airway pressure, respiratory therapy assess nares and determine need for appliance change or resting period.   Outcome: Progressing     Problem: Safety - Adult  Goal: Free from fall injury  Outcome: Progressing     Problem: ABCDS Injury Assessment  Goal: Absence of physical injury  Outcome: Progressing  Flowsheets (Taken 6/7/2022 1933)  Absence of Physical Injury: Implement safety measures based on patient assessment     Problem: Chronic Conditions and Co-morbidities  Goal: Patient's chronic conditions and co-morbidity symptoms are monitored and maintained or improved  Outcome: Progressing     Problem: Neurosensory - Adult  Goal: Achieves stable or improved neurological status  Outcome: Progressing  Goal: Absence of seizures  Outcome: Progressing  Goal: Remains free of injury related to seizures activity  Outcome: Progressing  Goal: Achieves maximal functionality and self care  Outcome: Progressing     Problem: Respiratory - Adult  Goal: Achieves optimal ventilation and oxygenation  Outcome: Progressing     Problem: Cardiovascular - Adult  Goal: Maintains optimal cardiac output and hemodynamic stability  Outcome: Progressing  Goal: Absence of cardiac dysrhythmias or at baseline  Outcome: Progressing     Problem: Skin/Tissue Integrity - Adult  Goal: Skin integrity remains intact  Outcome: Progressing  Flowsheets  Taken 6/7/2022 1933  Skin Integrity Remains Intact: Monitor for areas of redness and/or skin breakdown  Taken 6/7/2022 1929  Skin Integrity Remains Intact: Monitor for areas of redness and/or skin breakdown  Goal: Incisions, wounds, or drain sites healing without S/S of infection  Outcome: Progressing  Goal: Oral mucous membranes remain intact  Outcome: Progressing     Problem: Musculoskeletal - Adult  Goal: Return mobility to safest level of function  Outcome: Progressing  Goal: Maintain proper alignment of affected body part  Outcome: Progressing  Goal: Return ADL status to a safe level of function  Outcome: Progressing     Problem: Gastrointestinal - Adult  Goal: Minimal or absence of nausea and vomiting  Outcome: Progressing  Goal: Maintains or returns to baseline bowel function  Outcome: Progressing  Goal: Maintains adequate nutritional intake  Outcome: Progressing     Problem: Genitourinary - Adult  Goal: Absence of urinary retention  Outcome: Progressing  Goal: Urinary catheter remains patent  Outcome: Progressing     Problem: Infection - Adult  Goal: Absence of infection at discharge  Outcome: Progressing  Goal: Absence of infection during hospitalization  Outcome: Progressing  Goal: Absence of fever/infection during anticipated neutropenic period  Outcome: Progressing     Problem: Metabolic/Fluid and Electrolytes - Adult  Goal: Electrolytes maintained within normal limits  Outcome: Progressing  Goal: Hemodynamic stability and optimal renal function maintained  Outcome: Progressing  Goal: Glucose maintained within prescribed range  Outcome: Progressing     Problem: Hematologic - Adult  Goal: Maintains hematologic stability  Outcome: Progressing

## 2022-06-08 NOTE — PROGRESS NOTES
Hospitalist Progress Note   Admit Date:  6/3/2022 10:16 AM   Name:  Odalis Sarmiento   Age:  61 y.o. Sex:  female  :  1955   MRN:  [id number]   Room:  [facility]    Presenting Complaint: Altered Mental Status     Reason(s) for Admission: Delirium [R41.0]  Acute febrile illness [Q86.6]  Acute metabolic encephalopathy [T09.48]     Hospital Course & Interval History:     Patient with past medical history of  Previous CVA with residual left-sided weakness  Bed-bound  Patient has no teeth but able to eat regular food. CAD  Type 2 diabetes mellitus   history of pulmonary embolism, not on anticoagulation now. Anxiety  Hypothyroidism  Chronic pain  Parkinson disease    Patient was brought to hospital from nursing facility due to change in mental status. Normally patient could speak without issues. No detailed history is available. Patient was found to have elevated blood pressure, with heart rate of 111/min. Potassium was low, procalcitonin was low. Viral panel was negative. UA is positive for nitrites and 4+ bacteria, no white blood cells however. CT head and chest x-ray showed no significant acute findings. Patient was admitted due to change in mental status and for treatment of UTI. Subjective/24hr Events (22):    22   Patient is lying in bed and opens eyes when she is called and stimulated. She does not answer questions. No fever. No shaking. No chills. No report of shortness of breath. No report of chest pain. No diarrhea. No blood per rectum. No blood per urine. 22   Patient is mostly lying in bed and not talking. She does not respond to verbal stimuli. No fever in the last 24 hours. No chest pain. No shortness of breath. Patient is assessed by speech therapist and is allowed to have minced and moist diet now. 22   Patient is lying in bed and does not respond much verbally to stimuli. No fever. No shaking. No chills.    No shortness of breath. 6/7/22   Patient seems to open eyes more easily when she is called. No fever. No shaking. No chills. No chest pain. No shortness of breath. 6/8/22   Patient is resting in bed well. Not much interaction with me when she is called. No fever. No shaking. No chills. No chest pain. No shortness of breath. Assessment & Plan:     Principal Problem:    Acute metabolic encephalopathy    UTI (urinary tract infection)  Patient has completed empiric ceftriaxone 1 g every 24 hours. Blood culture is negative. Urine culture grew E.coli which is pan-sensitive. Speech therapist deems patient suitable for minced and moist diet. Mentation does not improve much although she seems more responsive to stimuli more. Continue close monitoring. Active Problems:    Diabetes mellitus ([address]  Monitor blood sugar. Cover with insulin sliding scale accordingly. Blood sugar is mostly in lower than 100 ranges. That is acceptable. Change Humalog sliding scale coverage to low-dose regimen. RYAN (obstructive sleep apnea)  Noted       Hypokalemia  Continues to have hypokalemia   Aggressive potassium supplementation    Stroke [facility])  Previous stroke  Continue with aspirin, rosuvastatin. HTN (hypertension)  Blood pressure is mostly in acceptable ranges. Will monitor closely. May need to start antihypertensive medication gingerly. On PRN medication, Hydralazine. Discharge Planning:    Likely can go back to long-term care facility where she came from when she is improved. Bed is held until June 12, 2022. Diet:  ADULT DIET; Full Liquid  DVT PPx: heparin SC   Code status: Full Code    Hospital Problems:  Principal Problem:    Acute metabolic encephalopathy  Active Problems:    UTI (urinary tract infection)    Diabetes mellitus ([facility])    RYAN (obstructive sleep apnea)    Hypokalemia    Stroke ([facility] [address]    HTN (hypertension)  Resolved Problems:    * No resolved hospital problems. *      Objective:     Patient Vitals for the past 24 hrs:   Temp Pulse Resp BP SpO2   06/08/22 0800 98.2 °F (36.8 °C) (!) 102 18 (!) 148/80 95 %   06/08/22 0458 98.6 °F (37 °C) (!) 112 19 (!) 151/85 94 %   06/08/22 0030 98.4 °F (36.9 °C) (!) 117 19 (!) 165/87 99 %   06/07/22 1930 97.9 °F (36.6 °C) (!) 113 19 (!) 152/94 99 %   06/07/22 1601 98.1 °F (36.7 °C) (!) 101 22 (!) 147/105 100 %   06/07/22 1540 -- (!) 101 20 -- 100 %   06/07/22 1137 98.6 °F (37 °C) (!) 105 20 130/76 98 %       Oxygen Therapy  SpO2: 95 %  Pulse Oximeter Device Mode: Intermittent  O2 Device: Nasal cannula  O2 Flow Rate (L/min): 2 L/min  O2 Delivery Method: Nasal cannula    Estimated body mass index is 27.85 kg/m² as calculated from the following:    Height as of this encounter: 5' 4\" (1.626 m). Weight as of this encounter: 162 lb 4.1 oz (73.6 kg). Intake/Output Summary (Last 24 hours) at 6/8/2022 0915  Last data filed at 6/8/2022 0511  Gross per 24 hour   Intake 2711.73 ml   Output 1504 ml   Net 1207.73 ml         Physical Exam:     Blood pressure (!) 148/80, pulse (!) 102, temperature 98.2 °F (36.8 °C), temperature source Axillary, resp. rate 18, height 5' 4\" (1.626 m), weight 162 lb 4.1 oz (73.6 kg), SpO2 95 %. General:    Well nourished. Patient is lying flat quietly in bed. She opens eyes when she is called. She does not answer questions. She does not answer her name when she was asked about it. She seems to open eyes more easily when she is called. Head:  Normocephalic, atraumatic, not pale. No icterus. Eyes:  Sclerae appear normal.  Pupils equally round. ENT:  Nares appear normal, no drainage. Moist oral mucosa  Neck:  No restricted ROM. Trachea midline   CV:   RRR. No m/r/g. No jugular venous distension. Lungs:   CTAB. No wheezing, rhonchi, or rales. Respirations even, unlabored  Abdomen: Bowel sounds present. Soft, nontender, nondistended. Extremities: No cyanosis or clubbing.   No edema  Skin:     No rashes and normal coloration. Warm and dry. Neuro:  As mentioned above, patient responded minimally to verbal stimuli. Does not seem to have localized weakness at this time. No spasticity of her extremities.        I have reviewed ordered lab tests and independently visualized imaging below:    Recent Labs:  Recent Results (from the past 48 hour(s))   Basic Metabolic Panel    Collection Time: 06/06/22  9:23 AM   Result Value Ref Range    Sodium 136 136 - 145 mmol/L    Potassium 3.0 (L) 3.5 - 5.1 mmol/L    Chloride 106 98 - 107 mmol/L    CO2 21 21 - 32 mmol/L    Anion Gap 9 7 - 16 mmol/L    Glucose 122 (H) 65 - 100 mg/dL    BUN 9 8 - 23 MG/DL    CREATININE 0.50 (L) 0.6 - 1.0 MG/DL    GFR African American >60 >60 ml/min/1.73m2    GFR Non- >60 >60 ml/min/1.73m2    Calcium 8.5 8.3 - 10.4 MG/DL   POCT Glucose    Collection Time: 06/06/22 11:44 AM   Result Value Ref Range    POC Glucose 105 (H) 65 - 100 mg/dL    Performed by: [employer]    POCT Glucose    Collection Time: 06/06/22  5:38 PM   Result Value Ref Range    POC Glucose 108 (H) 65 - 100 mg/dL    Performed by: [employer]    POCT Glucose    Collection Time: 06/06/22 11:45 PM   Result Value Ref Range    POC Glucose 91 65 - 100 mg/dL    Performed by: [employer]    Basic Metabolic Panel    Collection Time: 06/07/22  4:23 AM   Result Value Ref Range    Sodium 140 136 - 145 mmol/L    Potassium 2.8 (L) 3.5 - 5.1 mmol/L    Chloride 109 (H) 98 - 107 mmol/L    CO2 20 (L) 21 - 32 mmol/L    Anion Gap 11 7 - 16 mmol/L    Glucose 101 (H) 65 - 100 mg/dL    BUN 6 (L) 8 - 23 MG/DL    CREATININE 0.40 (L) 0.6 - 1.0 MG/DL    GFR African American >60 >60 ml/min/1.73m2    GFR Non- >60 >60 ml/min/1.73m2    Calcium 8.4 8.3 - 10.4 MG/DL   Magnesium    Collection Time: 06/07/22  4:23 AM   Result Value Ref Range    Magnesium 2.2 1.8 - 2.4 mg/dL   POCT Glucose    Collection Time: 06/07/22  5:55 AM   Result Value Ref Range    POC Glucose 92 65 - 100 mg/dL    Performed by: Pj Santiago    POCT Glucose    Collection Time: 06/07/22 11:36 AM   Result Value Ref Range    POC Glucose 117 (H) 65 - 100 mg/dL    Performed by: Yanci Lira    POCT Glucose    Collection Time: 06/07/22  5:18 PM   Result Value Ref Range    POC Glucose 79 65 - 100 mg/dL    Performed by: Katie Swenson    POCT Glucose    Collection Time: 06/08/22 12:19 AM   Result Value Ref Range    POC Glucose 97 65 - 100 mg/dL    Performed by: [id number]    Basic Metabolic Panel    Collection Time: 06/08/22  5:21 AM   Result Value Ref Range    Sodium 141 136 - 145 mmol/L    Potassium 4.2 3.5 - 5.1 mmol/L    Chloride 114 (H) 98 - 107 mmol/L    CO2 21 21 - 32 mmol/L    Anion Gap 6 (L) 7 - 16 mmol/L    Glucose 92 65 - 100 mg/dL    BUN 2 (L) 8 - 23 MG/DL    CREATININE 0.30 (L) 0.6 - 1.0 MG/DL    GFR African American >60 >60 ml/min/1.73m2    GFR Non- >60 >60 ml/min/1.73m2    Calcium 8.5 8.3 - 10.4 MG/DL   POCT Glucose    Collection Time: 06/08/22  6:11 AM   Result Value Ref Range    POC Glucose 89 65 - 100 mg/dL    Performed by: Beka    POCT Glucose    Collection Time: 06/08/22  8:12 AM   Result Value Ref Range    POC Glucose 95 65 - 100 mg/dL    Performed by: Sabrina        Other Studies:  CT HEAD WO CONTRAST    Result Date: 6/3/2022  CT of the head without contrast. CLINICAL INDICATION: Lethargy, altered mental status PROCEDURE: Serial thin section axial images are obtained from the cranial vertex through the skull base without the administration of intravenous contrast. Radiation dose reduction techniques were used for this study. Our CT scanners use one or all of the following: Automated exposure control, adjusted of the mA and/or kV according to patient size, iterative reconstruction COMPARISON: Head CT dated 2/15/2013. FINDINGS: There is no acute intracranial hemorrhage, mass, or mass effect.  No abnormal extra-axial fluid collections identified. There is no hydrocephalus. The basilar cisterns are widely patent. Extensive bilateral patchy white matter hypoattenuation again noted. It is unchanged. No findings present to indicate large, cortical-based territorial infarction. There is a small old right basal ganglia lacunar infarct. No skull fracture or aggressive osseous lesion noted. Thick mucoperiosteal thickening is noted in the right maxillary sinus. The mastoid air cells are clear. 1. No acute intracranial abnormality. 2. Extensive bilateral white matter hypoattenuation most in keeping with chronic microangiopathic disease. 3. Chronic appearing mucoperiosteal thickening in the right maxillary sinus. XR CHEST PORTABLE    Result Date: 6/3/2022  Portable chest x-ray CLINICAL INDICATION: Altered mental status. FINDINGS: Single AP view the chest compared to a similar exam dated 8/17/2006 shows mild subsegmental atelectasis in the right midlung. Otherwise the lung parenchyma is clear. There is no pleural effusion or pneumothorax. The cardiac silhouette and mediastinum are unremarkable. Mild subsegmental atelectasis in the right midlung. Otherwise no acute abnormality.       Current Meds:  Current Facility-Administered Medications   Medication Dose Route Frequency    [Held by provider] potassium chloride (KLOR-CON M) extended release tablet 40 mEq  40 mEq Oral TID WC    nystatin (MYCOSTATIN) 947490 UNIT/ML suspension 500,000 Units  5 mL Oral 4x Daily    aspirin tablet 325 mg  325 mg Oral Daily    rosuvastatin (CRESTOR) tablet 20 mg  20 mg Oral Nightly    0.9 % sodium chloride infusion   IntraVENous Continuous    insulin lispro (HUMALOG) injection vial 0-8 Units  0-8 Units SubCUTAneous Q6H    glucose chewable tablet 16 g  4 tablet Oral PRN    dextrose bolus 10% 125 mL  125 mL IntraVENous PRN    Or    dextrose bolus 10% 250 mL  250 mL IntraVENous PRN    glucagon injection 1 mg  1 mg IntraMUSCular PRN    dextrose 5 % solution  100 mL/hr IntraVENous PRN    metoprolol (LOPRESSOR) injection 5 mg  5 mg IntraVENous Q6H PRN    sodium chloride flush 0.9 % injection 3 mL  3 mL IntraVENous Q8H    DULoxetine (CYMBALTA) extended release capsule 120 mg  120 mg Oral Daily    levothyroxine (SYNTHROID) tablet 25 mcg  25 mcg Oral Daily    carbidopa-levodopa (SINEMET)  MG per tablet 2 tablet  2 tablet Oral TID    carboxymethylcellulose (THERATEARS) 1 % ophthalmic gel 1 drop  1 drop Both Eyes Q12H    ARIPiprazole (ABILIFY) tablet 15 mg  15 mg Oral Daily    topiramate (TOPAMAX) tablet 50 mg  50 mg Oral Daily    divalproex (DEPAKOTE SPRINKLE) capsule 250 mg  250 mg Oral 3 times per day    traZODone (DESYREL) tablet 25 mg  25 mg Oral Nightly    ketorolac (TORADOL) tablet 10 mg  10 mg Oral Q6H PRN    sodium chloride flush 0.9 % injection 5-40 mL  5-40 mL IntraVENous PRN    sodium chloride flush 0.9 % injection 5-40 mL  5-40 mL IntraVENous 2 times per day    sodium chloride flush 0.9 % injection 5-40 mL  5-40 mL IntraVENous PRN    0.9 % sodium chloride infusion   IntraVENous PRN    polyethylene glycol (GLYCOLAX) packet 17 g  17 g Oral Daily PRN    acetaminophen (TYLENOL) tablet 650 mg  650 mg Oral Q6H PRN    Or    acetaminophen (TYLENOL) suppository 650 mg  650 mg Rectal Q6H PRN    heparin (porcine) injection 5,000 Units  5,000 Units SubCUTAneous 3 times per day    hydrALAZINE (APRESOLINE) injection 20 mg  20 mg IntraVENous Q6H PRN    butalbital-acetaminophen-caffeine (FIORICET, ESGIC) per tablet 1 tablet  1 tablet Oral Q4H PRN    pantoprazole (PROTONIX) 40 mg in sodium chloride (PF) 10 mL injection  40 mg IntraVENous Daily       Signed:  Nitza Toussaint MD    Part of this note may have been written by using a voice dictation software. The note has been proof read but may still contain some grammatical/other typographical errors.

## 2022-06-09 LAB
BACTERIA SPEC CULT: NORMAL
GLUCOSE BLD STRIP.AUTO-MCNC: 110 MG/DL (ref 65–100)
GLUCOSE BLD STRIP.AUTO-MCNC: 117 MG/DL (ref 65–100)
GLUCOSE BLD STRIP.AUTO-MCNC: 85 MG/DL (ref 65–100)
GLUCOSE BLD STRIP.AUTO-MCNC: 90 MG/DL (ref 65–100)
GRAM STN SPEC: NORMAL
GRAM STN SPEC: NORMAL
SERVICE CMNT-IMP: ABNORMAL
SERVICE CMNT-IMP: ABNORMAL
SERVICE CMNT-IMP: NORMAL

## 2022-06-09 PROCEDURE — 82962 GLUCOSE BLOOD TEST: CPT

## 2022-06-09 PROCEDURE — 2580000003 HC RX 258: Performed by: FAMILY MEDICINE

## 2022-06-09 PROCEDURE — 2580000003 HC RX 258: Performed by: EMERGENCY MEDICINE

## 2022-06-09 PROCEDURE — 51702 INSERT TEMP BLADDER CATH: CPT

## 2022-06-09 PROCEDURE — 1100000000 HC RM PRIVATE

## 2022-06-09 PROCEDURE — 6360000002 HC RX W HCPCS: Performed by: FAMILY MEDICINE

## 2022-06-09 PROCEDURE — 92526 ORAL FUNCTION THERAPY: CPT

## 2022-06-09 PROCEDURE — 6370000000 HC RX 637 (ALT 250 FOR IP): Performed by: INTERNAL MEDICINE

## 2022-06-09 PROCEDURE — 6370000000 HC RX 637 (ALT 250 FOR IP): Performed by: FAMILY MEDICINE

## 2022-06-09 RX ADMIN — NYSTATIN 500000 UNITS: 100000 SUSPENSION ORAL at 14:48

## 2022-06-09 RX ADMIN — ASPIRIN 325 MG ORAL TABLET 325 MG: 325 PILL ORAL at 08:16

## 2022-06-09 RX ADMIN — TOPIRAMATE 50 MG: 50 TABLET ORAL at 08:16

## 2022-06-09 RX ADMIN — DIVALPROEX SODIUM 250 MG: 125 CAPSULE ORAL at 14:48

## 2022-06-09 RX ADMIN — SODIUM CHLORIDE, PRESERVATIVE FREE 3 ML: 5 INJECTION INTRAVENOUS at 12:02

## 2022-06-09 RX ADMIN — HEPARIN SODIUM 5000 UNITS: 5000 INJECTION INTRAVENOUS; SUBCUTANEOUS at 21:22

## 2022-06-09 RX ADMIN — HEPARIN SODIUM 5000 UNITS: 5000 INJECTION INTRAVENOUS; SUBCUTANEOUS at 14:48

## 2022-06-09 RX ADMIN — NYSTATIN 500000 UNITS: 100000 SUSPENSION ORAL at 17:06

## 2022-06-09 RX ADMIN — SODIUM CHLORIDE, PRESERVATIVE FREE 10 ML: 5 INJECTION INTRAVENOUS at 08:18

## 2022-06-09 RX ADMIN — LEVOTHYROXINE SODIUM 25 MCG: 0.05 TABLET ORAL at 05:58

## 2022-06-09 RX ADMIN — CARBOXYMETHYLCELLULOSE SODIUM 1 DROP: 10 GEL OPHTHALMIC at 21:22

## 2022-06-09 RX ADMIN — CARBIDOPA AND LEVODOPA 2 TABLET: 25; 100 TABLET ORAL at 14:48

## 2022-06-09 RX ADMIN — DIVALPROEX SODIUM 250 MG: 125 CAPSULE ORAL at 05:58

## 2022-06-09 RX ADMIN — SODIUM CHLORIDE, PRESERVATIVE FREE 10 ML: 5 INJECTION INTRAVENOUS at 21:23

## 2022-06-09 RX ADMIN — CARBIDOPA AND LEVODOPA 2 TABLET: 25; 100 TABLET ORAL at 21:22

## 2022-06-09 RX ADMIN — DULOXETINE HYDROCHLORIDE 120 MG: 30 CAPSULE, DELAYED RELEASE ORAL at 08:16

## 2022-06-09 RX ADMIN — PANTOPRAZOLE SODIUM 40 MG: 40 TABLET, DELAYED RELEASE ORAL at 05:58

## 2022-06-09 RX ADMIN — TRAZODONE HYDROCHLORIDE 25 MG: 50 TABLET ORAL at 21:22

## 2022-06-09 RX ADMIN — CARBIDOPA AND LEVODOPA 2 TABLET: 25; 100 TABLET ORAL at 08:16

## 2022-06-09 RX ADMIN — CARBOXYMETHYLCELLULOSE SODIUM 1 DROP: 10 GEL OPHTHALMIC at 08:17

## 2022-06-09 RX ADMIN — NYSTATIN 500000 UNITS: 100000 SUSPENSION ORAL at 21:22

## 2022-06-09 RX ADMIN — HEPARIN SODIUM 5000 UNITS: 5000 INJECTION INTRAVENOUS; SUBCUTANEOUS at 05:58

## 2022-06-09 RX ADMIN — SODIUM CHLORIDE, PRESERVATIVE FREE 3 ML: 5 INJECTION INTRAVENOUS at 18:23

## 2022-06-09 RX ADMIN — ARIPIPRAZOLE 15 MG: 5 TABLET ORAL at 08:16

## 2022-06-09 RX ADMIN — ROSUVASTATIN CALCIUM 20 MG: 10 TABLET, FILM COATED ORAL at 21:22

## 2022-06-09 RX ADMIN — NYSTATIN 500000 UNITS: 100000 SUSPENSION ORAL at 08:17

## 2022-06-09 RX ADMIN — DIVALPROEX SODIUM 250 MG: 125 CAPSULE ORAL at 21:21

## 2022-06-09 ASSESSMENT — PAIN SCALES - WONG BAKER: WONGBAKER_NUMERICALRESPONSE: 0

## 2022-06-09 NOTE — PROGRESS NOTES
Hospitalist Progress Note   Admit Date:  6/3/2022 10:16 AM   Name:  Melany Pa   Age:  61 y.o. Sex:  female  :  1955   MRN:  [id number]   Room:  [facility]/    Presenting Complaint: Altered Mental Status     Reason(s) for Admission: Delirium [R41.0]  Acute febrile illness [U34.0]  Acute metabolic encephalopathy [V92.21]     Hospital Course & Interval History:     Patient with past medical history of  Previous CVA with residual left-sided weakness  Bed-bound  Patient has no teeth but able to eat regular food. CAD  Type 2 diabetes mellitus   history of pulmonary embolism, not on anticoagulation now. Anxiety  Hypothyroidism  Chronic pain  Parkinson disease    Patient was brought to hospital from nursing facility due to change in mental status. Normally patient could speak without issues. No detailed history is available. Patient was found to have elevated blood pressure, with heart rate of 111/min. Potassium was low, procalcitonin was low. Viral panel was negative. UA is positive for nitrites and 4+ bacteria, no white blood cells however. CT head and chest x-ray showed no significant acute findings. Patient was admitted due to change in mental status and for treatment of UTI. Subjective/24hr Events (22):    22   Patient is lying in bed and opens eyes when she is called and stimulated. She does not answer questions. No fever. No shaking. No chills. No report of shortness of breath. No report of chest pain. No diarrhea. No blood per rectum. No blood per urine. 22   Patient is mostly lying in bed and not talking. She does not respond to verbal stimuli. No fever in the last 24 hours. No chest pain. No shortness of breath. Patient is assessed by speech therapist and is allowed to have minced and moist diet now. 22   Patient is lying in bed and does not respond much verbally to stimuli. No fever. No shaking. No chills.    No shortness of breath. 6/7/22   Patient seems to open eyes more easily when she is called. No fever. No shaking. No chills. No chest pain. No shortness of breath. 6/8/22   Patient is resting in bed well. Not much interaction with me when she is called. No fever. No shaking. No chills. No chest pain. No shortness of breath. 6/9/22   Patient is lying comfortably in bed. She opens eyes with loud verbal stimuli. No fever. No shortness of breath. No chest pain. Assessment & Plan:     Principal Problem:    Acute metabolic encephalopathy    UTI (urinary tract infection)  Patient has completed empiric ceftriaxone. Blood culture is negative. Urine culture grew E.coli which is pan-sensitive. Speech therapist deems patient suitable for pureed diet. Mentation does not improve much although she seems more responsive to stimuli. Continue close monitoring. Active Problems:    Diabetes mellitus ([address]  Monitor blood sugar. Cover with insulin sliding scale accordingly. Blood sugar is mostly in lower than 100 ranges. That is acceptable. Change Humalog sliding scale coverage to low-dose regimen. RYAN (obstructive sleep apnea)  Noted       Hypokalemia  Continues to have hypokalemia   Aggressive potassium supplementation  K is normalized. Stroke [facility])  Previous stroke  Continue with aspirin, rosuvastatin. HTN (hypertension)  Blood pressure is mostly in acceptable ranges. Will monitor closely. May need to start antihypertensive medication gingerly. On PRN medication, Hydralazine. BP is 137/80 mmHg. Discharge Planning:    Likely can go back to long-term care facility where she came from when she is improved. Bed is held until June 12, 2022. Diet:  ADULT DIET;  Dysphagia - Pureed; Mildly Thick (Nectar)  DVT PPx: heparin SC   Code status: Full Code    Hospital Problems:  Principal Problem:    Acute metabolic encephalopathy  Active Problems:    UTI (urinary tract infection)    Diabetes mellitus (HCC)    RYAN (obstructive sleep apnea)    Hypokalemia    Stroke ([facility]    HTN (hypertension)  Resolved Problems:    * No resolved hospital problems. *      Objective:     Patient Vitals for the past 24 hrs:   Temp Pulse Resp BP SpO2   06/09/22 1121 97.6 °F (36.4 °C) 97 16 137/80 96 %   06/09/22 0747 98.6 °F (37 °C) (!) 103 18 (!) 146/77 96 %   06/09/22 0326 99.3 °F (37.4 °C) (!) 109 18 (!) 143/72 96 %   06/08/22 2350 98.4 °F (36.9 °C) (!) 102 18 (!) 152/86 95 %   06/08/22 1938 98.4 °F (36.9 °C) (!) 108 18 (!) 149/99 99 %   06/08/22 1606 98.6 °F (37 °C) (!) 112 16 (!) 143/95 98 %       Oxygen Therapy  SpO2: 96 %  Pulse Oximeter Device Mode: Intermittent  O2 Device: Nasal cannula  O2 Flow Rate (L/min): 2 L/min  O2 Delivery Method: Nasal cannula    Estimated body mass index is 27.85 kg/m² as calculated from the following:    Height as of this encounter: 5' 4\" (1.626 m). Weight as of this encounter: 162 lb 4.1 oz (73.6 kg). Intake/Output Summary (Last 24 hours) at 6/9/2022 1224  Last data filed at 6/9/2022 0836  Gross per 24 hour   Intake 240 ml   Output 1200 ml   Net -960 ml         Physical Exam:     Blood pressure 137/80, pulse 97, temperature 97.6 °F (36.4 °C), temperature source Oral, resp. rate 16, height 5' 4\" (1.626 m), weight 162 lb 4.1 oz (73.6 kg), SpO2 96 %. General:    Well nourished. Patient is lying flat quietly in bed. She opens eyes when she is called loudly. She does not answer questions. She does not answer her name when she was asked about it. She seems to open eyes more easily when she is called though. Head:  Normocephalic, atraumatic, not pale. No icterus. Eyes:  Sclerae appear normal.  Pupils equally round. ENT:  Nares appear normal, no drainage. Moist oral mucosa  Neck:  No restricted ROM. Trachea midline   CV:   RRR. No m/r/g. No jugular venous distension. Lungs:   CTAB. No wheezing, rhonchi, or rales.   Respirations even, unlabored  Abdomen: Bowel sounds present. Soft, nontender, nondistended. Extremities: No cyanosis or clubbing. No edema  Skin:     No rashes and normal coloration. Warm and dry. Neuro:  As mentioned above, patient responded minimally to verbal stimuli. Does not seem to have localized weakness  No spasticity of her extremities.        I have reviewed ordered lab tests and independently visualized imaging below:    Recent Labs:  Recent Results (from the past 48 hour(s))   POCT Glucose    Collection Time: 06/07/22  5:18 PM   Result Value Ref Range    POC Glucose 79 65 - 100 mg/dL    Performed by: Thong Arteaga    POCT Glucose    Collection Time: 06/08/22 12:19 AM   Result Value Ref Range    POC Glucose 97 65 - 100 mg/dL    Performed by: [email or web address]    Basic Metabolic Panel    Collection Time: 06/08/22  5:21 AM   Result Value Ref Range    Sodium 141 136 - 145 mmol/L    Potassium 4.2 3.5 - 5.1 mmol/L    Chloride 114 (H) 98 - 107 mmol/L    CO2 21 21 - 32 mmol/L    Anion Gap 6 (L) 7 - 16 mmol/L    Glucose 92 65 - 100 mg/dL    BUN 2 (L) 8 - 23 MG/DL    CREATININE 0.30 (L) 0.6 - 1.0 MG/DL    GFR African American >60 >60 ml/min/1.73m2    GFR Non- >60 >60 ml/min/1.73m2    Calcium 8.5 8.3 - 10.4 MG/DL   POCT Glucose    Collection Time: 06/08/22  6:11 AM   Result Value Ref Range    POC Glucose 89 65 - 100 mg/dL    Performed by: Beka    POCT Glucose    Collection Time: 06/08/22  8:12 AM   Result Value Ref Range    POC Glucose 95 65 - 100 mg/dL    Performed by: Leroyarecompanion    POCT Glucose    Collection Time: 06/08/22 11:22 AM   Result Value Ref Range    POC Glucose 130 (H) 65 - 100 mg/dL    Performed by: Juan[employer] News NetworknedyCarecompanion    POCT Glucose    Collection Time: 06/08/22  4:08 PM   Result Value Ref Range    POC Glucose 108 (H) 65 - 100 mg/dL    Performed by: SaulKnexxLocalnedyCarecompanion    POCT Glucose    Collection Time: 06/08/22  9:05 PM   Result Value Ref Range    POC Glucose 109 (H) 65 - 100 mg/dL    Performed by: Elias    POCT Glucose    Collection Time: 06/09/22  7:49 AM   Result Value Ref Range    POC Glucose 90 65 - 100 mg/dL    Performed by: Sabrina    POCT Glucose    Collection Time: 06/09/22 11:22 AM   Result Value Ref Range    POC Glucose 117 (H) 65 - 100 mg/dL    Performed by: Sabrina        Other Studies:  CT HEAD WO CONTRAST    Result Date: 6/3/2022  CT of the head without contrast. CLINICAL INDICATION: Lethargy, altered mental status PROCEDURE: Serial thin section axial images are obtained from the cranial vertex through the skull base without the administration of intravenous contrast. Radiation dose reduction techniques were used for this study. Our CT scanners use one or all of the following: Automated exposure control, adjusted of the mA and/or kV according to patient size, iterative reconstruction COMPARISON: Head CT dated 2/15/2013. FINDINGS: There is no acute intracranial hemorrhage, mass, or mass effect. No abnormal extra-axial fluid collections identified. There is no hydrocephalus. The basilar cisterns are widely patent. Extensive bilateral patchy white matter hypoattenuation again noted. It is unchanged. No findings present to indicate large, cortical-based territorial infarction. There is a small old right basal ganglia lacunar infarct. No skull fracture or aggressive osseous lesion noted. Thick mucoperiosteal thickening is noted in the right maxillary sinus. The mastoid air cells are clear. 1. No acute intracranial abnormality. 2. Extensive bilateral white matter hypoattenuation most in keeping with chronic microangiopathic disease. 3. Chronic appearing mucoperiosteal thickening in the right maxillary sinus. XR CHEST PORTABLE    Result Date: 6/3/2022  Portable chest x-ray CLINICAL INDICATION: Altered mental status.  FINDINGS: Single AP view the chest compared to a similar exam dated 8/17/2006 shows mild subsegmental atelectasis in the right midlung. Otherwise the lung parenchyma is clear. There is no pleural effusion or pneumothorax. The cardiac silhouette and mediastinum are unremarkable. Mild subsegmental atelectasis in the right midlung. Otherwise no acute abnormality.       Current Meds:  Current Facility-Administered Medications   Medication Dose Route Frequency    insulin lispro (HUMALOG) injection vial 0-4 Units  0-4 Units SubCUTAneous TID WC    insulin lispro (HUMALOG) injection vial 0-4 Units  0-4 Units SubCUTAneous Nightly    pantoprazole (PROTONIX) tablet 40 mg  40 mg Oral QAM AC    [Held by provider] potassium chloride (KLOR-CON M) extended release tablet 40 mEq  40 mEq Oral TID     nystatin (MYCOSTATIN) 680724 UNIT/ML suspension 500,000 Units  5 mL Oral 4x Daily    aspirin tablet 325 mg  325 mg Oral Daily    rosuvastatin (CRESTOR) tablet 20 mg  20 mg Oral Nightly    0.9 % sodium chloride infusion   IntraVENous Continuous    glucose chewable tablet 16 g  4 tablet Oral PRN    dextrose bolus 10% 125 mL  125 mL IntraVENous PRN    Or    dextrose bolus 10% 250 mL  250 mL IntraVENous PRN    glucagon injection 1 mg  1 mg IntraMUSCular PRN    dextrose 5 % solution  100 mL/hr IntraVENous PRN    metoprolol (LOPRESSOR) injection 5 mg  5 mg IntraVENous Q6H PRN    sodium chloride flush 0.9 % injection 3 mL  3 mL IntraVENous Q8H    DULoxetine (CYMBALTA) extended release capsule 120 mg  120 mg Oral Daily    levothyroxine (SYNTHROID) tablet 25 mcg  25 mcg Oral Daily    carbidopa-levodopa (SINEMET)  MG per tablet 2 tablet  2 tablet Oral TID    carboxymethylcellulose (THERATEARS) 1 % ophthalmic gel 1 drop  1 drop Both Eyes Q12H    ARIPiprazole (ABILIFY) tablet 15 mg  15 mg Oral Daily    topiramate (TOPAMAX) tablet 50 mg  50 mg Oral Daily    divalproex (DEPAKOTE SPRINKLE) capsule 250 mg  250 mg Oral 3 times per day    traZODone (DESYREL) tablet 25 mg  25 mg Oral Nightly    sodium chloride flush 0.9 % injection 5-40 mL  5-40 mL IntraVENous PRN    sodium chloride flush 0.9 % injection 5-40 mL  5-40 mL IntraVENous 2 times per day    sodium chloride flush 0.9 % injection 5-40 mL  5-40 mL IntraVENous PRN    0.9 % sodium chloride infusion   IntraVENous PRN    polyethylene glycol (GLYCOLAX) packet 17 g  17 g Oral Daily PRN    acetaminophen (TYLENOL) tablet 650 mg  650 mg Oral Q6H PRN    Or    acetaminophen (TYLENOL) suppository 650 mg  650 mg Rectal Q6H PRN    heparin (porcine) injection 5,000 Units  5,000 Units SubCUTAneous 3 times per day    hydrALAZINE (APRESOLINE) injection 20 mg  20 mg IntraVENous Q6H PRN    butalbital-acetaminophen-caffeine (FIORICET, ESGIC) per tablet 1 tablet  1 tablet Oral Q4H PRN       Signed:  Ny Gupta MD    Part of this note may have been written by using a voice dictation software. The note has been proof read but may still contain some grammatical/other typographical errors.

## 2022-06-09 NOTE — CARE COORDINATION
Chart reviewed by CM. Patient remains hospitalized with a LOS of 6 days. Hospitalist/SLP following. No additional discharge needs identified at this time. [facility] to [facility] to [facility] , when stable for discharge. Patient has a 10 day bed hold. CM continues to follow care plan.

## 2022-06-09 NOTE — PROGRESS NOTES
clearing achieved with increased time. No overt s/sx airway compromise with mildly thick liquids by straw or puree trials. GENERAL    History of Present Injury/Illness: Ms. Kalpesh Gurrola  has a past medical history of Arthritis, CAD (coronary artery disease), Chronic kidney disease, Chronic pain, Coagulation defects, Diabetes ([address] GERD (gastroesophageal reflux disease), Hypertension, Morbid obesity ([address] PE (pulmonary thromboembolism) ([address] Psychiatric disorder, Stroke ([address] Thromboembolus ([address] Thyroid disease, and Unspecified sleep apnea. . She also  has a past surgical history that includes Tonsillectomy (as teen); Adenoidectomy (as child); heent; Hysterectomy (1992); Cholecystectomy, laparoscopic; and other surgical history. Prior Dysphagia History: Patient's sister reports some coughing with thin liquids. Current Diet : Pureed  Current Liquid Diet : Thin  Pain:   Patient does not appear in pain                                Hearing: Exceptions to [facility]    Hearing Exceptions: No hearing aid  OBJECTIVE                       Oropharyngeal Phase:    Patient seen for diet tolerance. Consumed ~ 3 oz mildly thick by straw and ~ 2 oz of pudding. Slowed bolus prep with pudding. Continues to appear to have delayed swallow initiation with liquid upon palpation, which is consistent with MBSS findings. No overt s/sx airway compromise with puree or mildly thick liquids by straw. PLAN    Duration/Frequency: Continue to follow patient 3x/week for duration of hospitalization and/or until goals met    Dysphagia Outcome and Severity Scale (GURPREET)  Dysphagia Outcome Severity Scale: Level 3: Moderate dysphagia- Total assisstance, supervision or strategies.  Two or more diet consistencies restricted  Interpretation of Tool: The Dysphagia Outcome and Severity Scale (GURPREET) is a simple, easy-to-use, 7-point scale developed to systematically rate the functional severity of dysphagia based on objective assessment and make recommendations for diet level, independence level, and type of nutrition. Normal(7), Functional(6), Mild(5), Mild-Moderate(4), Moderate(3), Moderate-Severe(2), Severe(1)         Education: RN,Patient  Patient Education: dysphagia, diet, positioning, aspiration risk  Patient Education Response: Demonstrated understanding    Current Medications:   No current facility-administered medications on file prior to encounter. Current Outpatient Medications on File Prior to Encounter   Medication Sig Dispense Refill    ARIPiprazole (ABILIFY) 15 MG tablet Take 15 mg by mouth      aspirin 325 MG tablet Take 325 mg by mouth daily      Cetirizine HCl 10 MG CAPS Take by mouth      docusate (COLACE, DULCOLAX) 100 MG CAPS Take 100 mg by mouth 2 times daily      DULoxetine (CYMBALTA) 60 MG extended release capsule Take 60 mg by mouth      esomeprazole (NEXIUM) 40 MG delayed release capsule Take by mouth daily      fluconazole (DIFLUCAN) 200 MG tablet Take 200 mg by mouth 2 times daily      liothyronine (CYTOMEL) 25 MCG tablet Take 25 mcg by mouth daily      LORazepam (ATIVAN) 2 MG tablet Take 2 mg by mouth 2 times daily.  nitroGLYCERIN (NITROSTAT) 0.4 MG SL tablet Place 0.4 mg under the tongue      oxyCODONE (OXY-IR) 15 MG immediate release tablet Take 15 mg by mouth every 8 hours as needed.  pregabalin (LYRICA) 75 MG capsule Take by mouth.  rosuvastatin (CRESTOR) 40 MG tablet Take 20 mg by mouth      tolterodine (DETROL LA) 4 MG extended release capsule Take 4 mg by mouth daily      traMADol (ULTRAM) 50 MG tablet Take 50 mg by mouth every 6 hours as needed.  zolpidem (AMBIEN) 10 MG tablet Take by mouth.          PRECAUTIONS/ALLERGIES: Acetaminophen, Carisoprodol, Cephalexin, Cyclobenzaprine, Erythromycin, Gabapentin, Hydrocodone-acetaminophen, Metoclopramide, Penicillins, Povidone-iodine, Risperidone, Sulfa antibiotics, Codeine, and Oxycodone-acetaminophen   Safety Devices in place: Yes  Type of devices: Nurse notified,Left in bed  Restraints Initially in Place: No    Therapy Time  SLP Individual Minutes  Time In: 6111  Time Out: 1246 [address]  Minutes: [address] [place], [place]  6/10/2022 1:40 PM

## 2022-06-10 LAB
GLUCOSE BLD STRIP.AUTO-MCNC: 112 MG/DL (ref 65–100)
GLUCOSE BLD STRIP.AUTO-MCNC: 114 MG/DL (ref 65–100)
GLUCOSE BLD STRIP.AUTO-MCNC: 84 MG/DL (ref 65–100)
GLUCOSE BLD STRIP.AUTO-MCNC: 95 MG/DL (ref 65–100)
GLUCOSE BLD STRIP.AUTO-MCNC: 99 MG/DL (ref 65–100)
SERVICE CMNT-IMP: ABNORMAL
SERVICE CMNT-IMP: ABNORMAL
SERVICE CMNT-IMP: NORMAL

## 2022-06-10 PROCEDURE — 51702 INSERT TEMP BLADDER CATH: CPT

## 2022-06-10 PROCEDURE — 6360000002 HC RX W HCPCS: Performed by: FAMILY MEDICINE

## 2022-06-10 PROCEDURE — 99222 1ST HOSP IP/OBS MODERATE 55: CPT | Performed by: PSYCHIATRY & NEUROLOGY

## 2022-06-10 PROCEDURE — 82962 GLUCOSE BLOOD TEST: CPT

## 2022-06-10 PROCEDURE — 97535 SELF CARE MNGMENT TRAINING: CPT

## 2022-06-10 PROCEDURE — 1100000000 HC RM PRIVATE

## 2022-06-10 PROCEDURE — 97165 OT EVAL LOW COMPLEX 30 MIN: CPT

## 2022-06-10 PROCEDURE — 2580000003 HC RX 258: Performed by: INTERNAL MEDICINE

## 2022-06-10 PROCEDURE — 6370000000 HC RX 637 (ALT 250 FOR IP): Performed by: FAMILY MEDICINE

## 2022-06-10 PROCEDURE — 6370000000 HC RX 637 (ALT 250 FOR IP): Performed by: INTERNAL MEDICINE

## 2022-06-10 PROCEDURE — 2580000003 HC RX 258: Performed by: FAMILY MEDICINE

## 2022-06-10 RX ADMIN — ARIPIPRAZOLE 15 MG: 5 TABLET ORAL at 08:51

## 2022-06-10 RX ADMIN — SODIUM CHLORIDE: 900 INJECTION, SOLUTION INTRAVENOUS at 15:31

## 2022-06-10 RX ADMIN — HEPARIN SODIUM 5000 UNITS: 5000 INJECTION INTRAVENOUS; SUBCUTANEOUS at 05:20

## 2022-06-10 RX ADMIN — CARBOXYMETHYLCELLULOSE SODIUM 1 DROP: 10 GEL OPHTHALMIC at 21:03

## 2022-06-10 RX ADMIN — NYSTATIN 500000 UNITS: 100000 SUSPENSION ORAL at 08:51

## 2022-06-10 RX ADMIN — TOPIRAMATE 50 MG: 50 TABLET ORAL at 08:51

## 2022-06-10 RX ADMIN — LEVOTHYROXINE SODIUM 25 MCG: 0.05 TABLET ORAL at 05:19

## 2022-06-10 RX ADMIN — SODIUM CHLORIDE: 900 INJECTION, SOLUTION INTRAVENOUS at 01:44

## 2022-06-10 RX ADMIN — CARBIDOPA AND LEVODOPA 2 TABLET: 25; 100 TABLET ORAL at 08:51

## 2022-06-10 RX ADMIN — DIVALPROEX SODIUM 250 MG: 125 CAPSULE ORAL at 21:03

## 2022-06-10 RX ADMIN — ROSUVASTATIN CALCIUM 20 MG: 10 TABLET, FILM COATED ORAL at 21:03

## 2022-06-10 RX ADMIN — DIVALPROEX SODIUM 250 MG: 125 CAPSULE ORAL at 05:20

## 2022-06-10 RX ADMIN — SODIUM CHLORIDE, PRESERVATIVE FREE 10 ML: 5 INJECTION INTRAVENOUS at 08:52

## 2022-06-10 RX ADMIN — DULOXETINE HYDROCHLORIDE 120 MG: 30 CAPSULE, DELAYED RELEASE ORAL at 08:51

## 2022-06-10 RX ADMIN — NYSTATIN 500000 UNITS: 100000 SUSPENSION ORAL at 14:33

## 2022-06-10 RX ADMIN — DIVALPROEX SODIUM 250 MG: 125 CAPSULE ORAL at 14:33

## 2022-06-10 RX ADMIN — NYSTATIN 500000 UNITS: 100000 SUSPENSION ORAL at 17:48

## 2022-06-10 RX ADMIN — HEPARIN SODIUM 5000 UNITS: 5000 INJECTION INTRAVENOUS; SUBCUTANEOUS at 14:34

## 2022-06-10 RX ADMIN — CARBOXYMETHYLCELLULOSE SODIUM 1 DROP: 10 GEL OPHTHALMIC at 08:50

## 2022-06-10 RX ADMIN — HEPARIN SODIUM 5000 UNITS: 5000 INJECTION INTRAVENOUS; SUBCUTANEOUS at 21:02

## 2022-06-10 RX ADMIN — CARBIDOPA AND LEVODOPA 2 TABLET: 25; 100 TABLET ORAL at 14:33

## 2022-06-10 RX ADMIN — ASPIRIN 325 MG ORAL TABLET 325 MG: 325 PILL ORAL at 08:51

## 2022-06-10 RX ADMIN — SODIUM CHLORIDE, PRESERVATIVE FREE 10 ML: 5 INJECTION INTRAVENOUS at 21:04

## 2022-06-10 RX ADMIN — NYSTATIN 500000 UNITS: 100000 SUSPENSION ORAL at 21:03

## 2022-06-10 RX ADMIN — CARBIDOPA AND LEVODOPA 2 TABLET: 25; 100 TABLET ORAL at 21:03

## 2022-06-10 RX ADMIN — TRAZODONE HYDROCHLORIDE 25 MG: 50 TABLET ORAL at 21:02

## 2022-06-10 RX ADMIN — PANTOPRAZOLE SODIUM 40 MG: 40 TABLET, DELAYED RELEASE ORAL at 05:19

## 2022-06-10 ASSESSMENT — PAIN SCALES - WONG BAKER: WONGBAKER_NUMERICALRESPONSE: 0

## 2022-06-10 NOTE — CONSULTS
Consult    Patient: Angel Vivas MRN: [id number]     YOB: 1955  Age: 61 y.o. Sex: female      Subjective:      Angel Vivas is a 61 y.o. female who is being seen for decreased responsiveness. The patient has a long history of parkinsonism (not Parkinson disease) which is either drug-induced or vascular parkinsonism given her extensive periventricular white matter disease. She is currently admitted with a urinary tract infection and her daughter states that she has been less spontaneous in conversation. Her sister is at bedside and also states that the patient has been having some short-term memory loss that is been going on for months now. She also has a history of stroke with weakness on the left side of the body. In addition, the patient has a history of migraines and is treated with Botox injections. She has finished her course of ceftriaxone for her urinary tract infection.     Past Medical History:   Diagnosis Date    Arthritis     Back, knees    CAD (coronary artery disease)     Chronic kidney disease     August 2010 ARF-- no dialysis--  r/t sepsis per pt    Chronic pain     Coagulation defects     Diabetes ([address] x 6 yrs     type2- sqbs avg am- 125-130---- hypo at [de-identified] -80's    GERD (gastroesophageal reflux disease)     controlled with meds    Hypertension x 5 + yrs    controlled with meds    Morbid obesity ([address]     bmi=47    PE (pulmonary thromboembolism) ([address] 2007, 2009    Psychiatric disorder     anxiety depression    Stroke [facility]) 2 yrs ago    left sided weakness    [address] [address] [facility]     Thyroid disease     Unspecified sleep apnea     wears cpap-\" most of time \" per pt-- wears O2 at hs \" sometimes\" per pt     Past Surgical History:   Procedure Laterality Date    ADENOIDECTOMY  as child    CHOLECYSTECTOMY, LAPAROSCOPIC      1993    HEENT      myringotomy x 2    HYSTERECTOMY  1992    OTHER SURGICAL HISTORY      [place] teeth    TONSILLECTOMY  as teen      Family History   Problem Relation Age of Onset    Liver Disease Father     Kidney Disease Father     Cancer Mother      Social History     Tobacco Use    Smoking status: Never Smoker    Smokeless tobacco: Never Used   Substance Use Topics    Alcohol use: No      Current Facility-Administered Medications   Medication Dose Route Frequency Provider Last Rate Last Admin    insulin lispro (HUMALOG) injection vial 0-4 Units  0-4 Units SubCUTAneous TID VERNON Saavedra MD        insulin lispro (HUMALOG) injection vial 0-4 Units  0-4 Units SubCUTAneous Nightly Ilia Stanley MD        pantoprazole (PROTONIX) tablet 40 mg  40 mg Oral QAM AC Bang Saavedra MD   40 mg at 06/10/22 0519    [Held by provider] potassium chloride (KLOR-CON M) extended release tablet 40 mEq  40 mEq Oral TID  Bang Saavedra MD   40 mEq at 06/07/22 1701    nystatin (MYCOSTATIN) 489502 UNIT/ML suspension 500,000 Units  5 mL Oral 4x Daily Bang Saavedra MD   500,000 Units at 06/10/22 0851    aspirin tablet 325 mg  325 mg Oral Daily Ilia Stanley MD   325 mg at 06/10/22 0851    rosuvastatin (CRESTOR) tablet 20 mg  20 mg Oral Nightly Bang Saavedra MD   20 mg at 06/09/22 2122    0.9 % sodium chloride infusion   IntraVENous Continuous Ilia Stanley MD 75 mL/hr at 06/10/22 0144 New Bag at 06/10/22 0144    glucose chewable tablet 16 g  4 tablet Oral PRN Bang Saavedra MD        dextrose bolus 10% 125 mL  125 mL IntraVENous PRTUCKER Saavedra MD        Or    dextrose bolus 10% 250 mL  250 mL IntraVENous PRN Bang Saavedra MD        glucagon injection 1 mg  1 mg IntraMUSCular PRN Bang Saavedra MD        dextrose 5 % solution  100 mL/hr IntraVENous PRN Bang Saavedra MD        metoprolol (LOPRESSOR) injection 5 mg  5 mg IntraVENous Q6H PRN Susan Churchill MD   5 mg at 06/05/22 0631    sodium chloride flush 0.9 % injection 3 mL  3 mL IntraVENous Q8H Jhonny Stewart MD   3 mL at 06/09/22 1823    DULoxetine (CYMBALTA) extended release capsule 120 mg  120 mg Oral Daily Sanjuanita Skains, DO   120 mg at 06/10/22 0851    levothyroxine (SYNTHROID) tablet 25 mcg  25 mcg Oral Daily Sanjuanita Skains, DO   25 mcg at 06/10/22 0519    carbidopa-levodopa (SINEMET)  MG per tablet 2 tablet  2 tablet Oral TID Sanjuanita Skains, DO   2 tablet at 06/10/22 0851    carboxymethylcellulose (THERATEARS) 1 % ophthalmic gel 1 drop  1 drop Both Eyes Q12H Sanjuanita Skains, DO   1 drop at 06/10/22 0850    ARIPiprazole (ABILIFY) tablet 15 mg  15 mg Oral Daily Sanjuanita Skains, DO   15 mg at 06/10/22 0851    topiramate (TOPAMAX) tablet 50 mg  50 mg Oral Daily Sanjuanita Skains, DO   50 mg at 06/10/22 0851    divalproex (DEPAKOTE SPRINKLE) capsule 250 mg  250 mg Oral 3 times per day Sanjuanita Skains, DO   250 mg at 06/10/22 0520    traZODone (DESYREL) tablet 25 mg  25 mg Oral Nightly Sanjuanita Skains, DO   25 mg at 06/09/22 2122    sodium chloride flush 0.9 % injection 5-40 mL  5-40 mL IntraVENous PRN Stu Christina MD        sodium chloride flush 0.9 % injection 5-40 mL  5-40 mL IntraVENous 2 times per day Nidia Skains, DO   10 mL at 06/10/22 0852    sodium chloride flush 0.9 % injection 5-40 mL  5-40 mL IntraVENous PRN [facility] Skains, DO        0.9 % sodium chloride infusion   IntraVENous PRN Nidia Skains, DO        polyethylene glycol (GLYCOLAX) packet 17 g  17 g Oral Daily PRN Nidia Skains, DO        acetaminophen (TYLENOL) tablet 650 mg  650 mg Oral Q6H PRN Nidia Skains, DO        Or    acetaminophen (TYLENOL) suppository 650 mg  650 mg Rectal Q6H PRN Nidia Skains, DO        heparin (porcine) injection 5,000 Units  5,000 Units SubCUTAneous 3 times per day Nidia Koch, DO   5,000 Units at 06/10/22 0520    hydrALAZINE (APRESOLINE) injection 20 mg  20 mg IntraVENous Q6H PRN Nidia Koch, DO   20 mg at 06/05/22 0302    butalbital-acetaminophen-caffeine (FIORICET, ESGIC) per tablet 1 tablet  1 tablet Oral Q4H PRN Fadia Pat, DO            Allergies   Allergen Reactions    Acetaminophen Other (See Comments)    Carisoprodol Other (See Comments)    Cephalexin Nausea Only    Cyclobenzaprine Other (See Comments)    Erythromycin Nausea Only    Gabapentin Other (See Comments)    Hydrocodone-Acetaminophen Other (See Comments)    Metoclopramide Other (See Comments)    Penicillins Swelling    Povidone-Iodine Other (See Comments)    Risperidone Other (See Comments)    Sulfa Antibiotics Nausea Only    Codeine Rash    Oxycodone-Acetaminophen Rash       Review of Systems:  Could not obtain due to medical condition        Objective:     Vitals:    06/09/22 1932 06/09/22 2318 06/10/22 0351 06/10/22 0844   BP: (!) 142/75 (!) 149/73 (!) 147/91 (!) 132/90   Pulse: (!) 107 100 (!) 102 94   Resp: 18 18 18 16   Temp: 98.2 °F (36.8 °C) 98.2 °F (36.8 °C) 99.1 °F (37.3 °C) 98.4 °F (36.9 °C)   TempSrc: Oral Oral Oral Oral   SpO2: 97% 96% 96% 97%   Weight:       Height:            Physical Exam:  General -: Chronically disabled. Appears older than stated age  [de-identified] - Normocephalic, atraumatic. Conjunctiva, tympanic membranes, and oropharynx are clear. Neck - Supple without masses, no bruits   Cardiovascular - Regular rate and rhythm. Normal S1, S2 without murmurs, rubs, or gallops. Lungs - Clear to auscultation. Abdomen - Soft, nontender with normal bowel sounds. Extremities - Peripheral pulses intact. No edema and no rashes. Neurological examination -    Comprehension is intact to one-step commands. She struggles to follow more complex commands. Attention is fair. Language and speech are limited. She does say short phrases. . On cranial nerve examination pupils are equal round and reactive to light. Visual fields are full to finger confrontation. Extraocular motility is normal. Face is symmetric and sensation is intact to light touch.  Hearing is intact to finger rustle bilaterally. Motor examination - There is normal muscle tone and bulk with exception to increased muscle tone in the left upper limb which is mild. In the bilateral upper extremities she has 4+/5 strength in the right upper limb and 4 -/5 strength in the left upper limb. Some spontaneous movements in the bilateral lower limbs, perhaps 2/5 strength. Muscle stretch reflexes are absent throughout. She could not participate in sensory examination, cerebellar examination, gait or stance due to her medical condition. CT Results (most recent): Personally Reviewed   Results for orders placed during the hospital encounter of 06/03/22    CT HEAD WO CONTRAST    Narrative  CT of the head without contrast.    CLINICAL INDICATION: Lethargy, altered mental status    PROCEDURE: Serial thin section axial images are obtained from the cranial vertex  through the skull base without the administration of intravenous contrast.  Radiation dose reduction techniques were used for this study. Our CT scanners  use one or all of the following: Automated exposure control, adjusted of the mA  and/or kV according to patient size, iterative reconstruction    COMPARISON: Head CT dated 2/15/2013. FINDINGS: There is no acute intracranial hemorrhage, mass, or mass effect. No  abnormal extra-axial fluid collections identified. There is no hydrocephalus. The basilar cisterns are widely patent. Extensive bilateral patchy white matter  hypoattenuation again noted. It is unchanged. No findings present to indicate  large, cortical-based territorial infarction. There is a small old right basal  ganglia lacunar infarct. No skull fracture or aggressive osseous lesion noted. Thick mucoperiosteal thickening is noted in the right maxillary sinus. The  mastoid air cells are clear. Impression  1. No acute intracranial abnormality.   2. Extensive bilateral white matter hypoattenuation most in keeping with chronic  microangiopathic disease. 3. Chronic appearing mucoperiosteal thickening in the right maxillary sinus. Assessment/Plan    82-year-old woman with a history of parkinsonism who has worsening cognition in the setting of urinary tract infection and low cognitive reserve. The patient likely has underlying dementia. Patients with dementia have worsening cognition when hospitalized, especially in the setting of a urinary tract infection, and typically recovery is over a period of weeks. We will obtain a brain MRI to rule out acute pathology, but her clinical course is not unexpected.

## 2022-06-10 NOTE — PROGRESS NOTES
Hospitalist Progress Note   Admit Date:  6/3/2022 10:16 AM   Name:  Digna Riggins   Age:  61 y.o. Sex:  female  :  1955   MRN:  [id number]   Room:  [facility]    Presenting Complaint: Altered Mental Status     Reason(s) for Admission: Delirium [R41.0]  Acute febrile illness [X47.9]  Acute metabolic encephalopathy [V68.91]     Hospital Course & Interval History:     Patient with past medical history of  Previous CVA with residual left-sided weakness  Bed-bound  Patient has no teeth but able to eat regular food. CAD  Type 2 diabetes mellitus   history of pulmonary embolism, not on anticoagulation now. Anxiety  Hypothyroidism  Chronic pain  Parkinson disease    Patient was brought to hospital from nursing facility due to change in mental status. Normally patient could speak without issues. No detailed history is available. Patient was found to have elevated blood pressure, with heart rate of 111/min. Potassium was low, procalcitonin was low. Viral panel was negative. UA is positive for nitrites and 4+ bacteria, no white blood cells however. CT head and chest x-ray showed no significant acute findings. Patient was admitted due to change in mental status and for treatment of UTI. Subjective/24hr Events (06/10/22):    22   Patient is lying in bed and opens eyes when she is called and stimulated. She does not answer questions. No fever. No shaking. No chills. No report of shortness of breath. No report of chest pain. No diarrhea. No blood per rectum. No blood per urine. 22   Patient is mostly lying in bed and not talking. She does not respond to verbal stimuli. No fever in the last 24 hours. No chest pain. No shortness of breath. Patient is assessed by speech therapist and is allowed to have minced and moist diet now. 22   Patient is lying in bed and does not respond much verbally to stimuli. No fever. No shaking. No chills.    No shortness of breath. 6/7/22   Patient seems to open eyes more easily when she is called. No fever. No shaking. No chills. No chest pain. No shortness of breath. 6/8/22   Patient is resting in bed well. Not much interaction with me when she is called. No fever. No shaking. No chills. No chest pain. No shortness of breath. 6/9/22   Patient is lying comfortably in bed. She opens eyes with loud verbal stimuli. No fever. No shortness of breath. No chest pain. 6/10/22   Patient is resting in bed and does not talk back when asked. No fever. No shaking. No chills. No shortness of breath. No chest pain. Assessment & Plan:     Principal Problem:    Acute metabolic encephalopathy    UTI (urinary tract infection)  Patient has completed empiric ceftriaxone. Blood culture is negative. Urine culture grew E.coli which is pan-sensitive. Speech therapist deems patient suitable for pureed diet. Mentation does not improve much although she seems more responsive to stimuli. Still not engaging in conversation. I am informed that patient was \"talking\" before coming to hospital.   CT brain on Meagan 3, 2022 did not show acute findings. Continue close monitoring. Active Problems:    Diabetes mellitus ([address]  Monitor blood sugar. Cover with insulin sliding scale accordingly. Blood sugar is mostly in lower than 100 ranges. That is acceptable. Change Humalog sliding scale coverage to low-dose regimen. RYAN (obstructive sleep apnea)  Noted       Hypokalemia  Continues to have hypokalemia   Aggressive potassium supplementation  K is normalized. Stroke [facility])  Previous stroke  Continue with aspirin, rosuvastatin. HTN (hypertension)  Blood pressure is mostly in acceptable ranges. Will monitor closely. May need to start antihypertensive medication gingerly. On PRN medication, Hydralazine. BP is 149/91 mmHg.        Discharge Planning:    Likely can go back to long-term care facility where she came from when she is improved. Bed is held until June 12, 2022. Diet:  ADULT DIET; Dysphagia - Pureed; Mildly Thick (Nectar)  DVT PPx: heparin SC   Code status: Full Code    Hospital Problems:  Principal Problem:    Acute metabolic encephalopathy  Active Problems:    UTI (urinary tract infection)    Diabetes mellitus (HCC)    RYAN (obstructive sleep apnea)    Hypokalemia    Stroke (HCC)    HTN (hypertension)  Resolved Problems:    * No resolved hospital problems. *      Objective:     Patient Vitals for the past 24 hrs:   Temp Pulse Resp BP SpO2   06/10/22 0844 98.4 °F (36.9 °C) 94 16 (!) 132/90 97 %   06/10/22 0351 99.1 °F (37.3 °C) (!) 102 18 (!) 147/91 96 %   06/09/22 2318 98.2 °F (36.8 °C) 100 18 (!) 149/73 96 %   06/09/22 1932 98.2 °F (36.8 °C) (!) 107 18 (!) 142/75 97 %   06/09/22 1553 97.3 °F (36.3 °C) 99 16 138/83 95 %   06/09/22 1121 97.6 °F (36.4 °C) 97 16 137/80 96 %       Oxygen Therapy  SpO2: 97 %  Pulse Oximeter Device Mode: Intermittent  O2 Device: None (Room air)  O2 Flow Rate (L/min): 2 L/min  O2 Delivery Method: Nasal cannula    Estimated body mass index is 27.85 kg/m² as calculated from the following:    Height as of this encounter: 5' 4\" (1.626 m). Weight as of this encounter: 162 lb 4.1 oz (73.6 kg). Intake/Output Summary (Last 24 hours) at 6/10/2022 0928  Last data filed at 6/10/2022 0703  Gross per 24 hour   Intake --   Output 1925 ml   Net -1925 ml         Physical Exam:     Blood pressure (!) 132/90, pulse 94, temperature 98.4 °F (36.9 °C), temperature source Oral, resp. rate 16, height 5' 4\" (1.626 m), weight 162 lb 4.1 oz (73.6 kg), SpO2 97 %. General:    Well nourished. Patient is lying flat quietly in bed. She opens eyes when she is called loudly. She does not answer questions. She does not answer her name when she was asked about it. Head:  Normocephalic, atraumatic, not pale. No icterus.    Eyes:  Sclerae appear normal.  Pupils equally round.  ENT:  Nares appear normal, no drainage. Moist oral mucosa  Neck:  No restricted ROM. Trachea midline   CV:   RRR. No m/r/g. No jugular venous distension. Lungs:   CTAB. No wheezing, rhonchi, or rales. Respirations even, unlabored  Abdomen: Bowel sounds present. Soft, nontender, nondistended. Extremities: No cyanosis or clubbing. No edema  Skin:     No rashes and normal coloration. Warm and dry. Neuro:  As mentioned above, patient responded minimally to verbal stimuli. Does not seem to have localized weakness  No spasticity of her extremities.        I have reviewed ordered lab tests and independently visualized imaging below:    Recent Labs:  Recent Results (from the past 48 hour(s))   POCT Glucose    Collection Time: 06/08/22 11:22 AM   Result Value Ref Range    POC Glucose 130 (H) 65 - 100 mg/dL    Performed by: [employer]    POCT Glucose    Collection Time: 06/08/22  4:08 PM   Result Value Ref Range    POC Glucose 108 (H) 65 - 100 mg/dL    Performed by: [employer]    POCT Glucose    Collection Time: 06/08/22  9:05 PM   Result Value Ref Range    POC Glucose 109 (H) 65 - 100 mg/dL    Performed by: [employer]    POCT Glucose    Collection Time: 06/09/22  7:49 AM   Result Value Ref Range    POC Glucose 90 65 - 100 mg/dL    Performed by: [employer]    POCT Glucose    Collection Time: 06/09/22 11:22 AM   Result Value Ref Range    POC Glucose 117 (H) 65 - 100 mg/dL    Performed by: [employer]    POCT Glucose    Collection Time: 06/09/22  3:55 PM   Result Value Ref Range    POC Glucose 110 (H) 65 - 100 mg/dL    Performed by: [employer]companion    POCT Glucose    Collection Time: 06/09/22  9:00 PM   Result Value Ref Range    POC Glucose 85 65 - 100 mg/dL    Performed by: [employer]    POCT Glucose    Collection Time: 06/10/22  2:03 AM   Result Value Ref Range    POC Glucose 95 65 - 100 mg/dL    Performed by: Elias    POCT Glucose    Collection Time: 06/10/22  8:03 AM   Result Value Ref Range    POC Glucose 84 65 - 100 mg/dL    Performed by: Ilene        Other Studies:  CT HEAD WO CONTRAST    Result Date: 6/3/2022  CT of the head without contrast. CLINICAL INDICATION: Lethargy, altered mental status PROCEDURE: Serial thin section axial images are obtained from the cranial vertex through the skull base without the administration of intravenous contrast. Radiation dose reduction techniques were used for this study. Our CT scanners use one or all of the following: Automated exposure control, adjusted of the mA and/or kV according to patient size, iterative reconstruction COMPARISON: Head CT dated 2/15/2013. FINDINGS: There is no acute intracranial hemorrhage, mass, or mass effect. No abnormal extra-axial fluid collections identified. There is no hydrocephalus. The basilar cisterns are widely patent. Extensive bilateral patchy white matter hypoattenuation again noted. It is unchanged. No findings present to indicate large, cortical-based territorial infarction. There is a small old right basal ganglia lacunar infarct. No skull fracture or aggressive osseous lesion noted. Thick mucoperiosteal thickening is noted in the right maxillary sinus. The mastoid air cells are clear. 1. No acute intracranial abnormality. 2. Extensive bilateral white matter hypoattenuation most in keeping with chronic microangiopathic disease. 3. Chronic appearing mucoperiosteal thickening in the right maxillary sinus. XR CHEST PORTABLE    Result Date: 6/3/2022  Portable chest x-ray CLINICAL INDICATION: Altered mental status. FINDINGS: Single AP view the chest compared to a similar exam dated 8/17/2006 shows mild subsegmental atelectasis in the right midlung. Otherwise the lung parenchyma is clear. There is no pleural effusion or pneumothorax. The cardiac silhouette and mediastinum are unremarkable.      Mild subsegmental atelectasis in the right midlung. Otherwise no acute abnormality.       Current Meds:  Current Facility-Administered Medications   Medication Dose Route Frequency    insulin lispro (HUMALOG) injection vial 0-4 Units  0-4 Units SubCUTAneous TID WC    insulin lispro (HUMALOG) injection vial 0-4 Units  0-4 Units SubCUTAneous Nightly    pantoprazole (PROTONIX) tablet 40 mg  40 mg Oral QAM AC    [Held by provider] potassium chloride (KLOR-CON M) extended release tablet 40 mEq  40 mEq Oral TID     nystatin (MYCOSTATIN) [id number] UNIT/ML suspension 500,000 Units  5 mL Oral 4x Daily    aspirin tablet 325 mg  325 mg Oral Daily    rosuvastatin (CRESTOR) tablet 20 mg  20 mg Oral Nightly    0.9 % sodium chloride infusion   IntraVENous Continuous    glucose chewable tablet 16 g  4 tablet Oral PRN    dextrose bolus 10% 125 mL  125 mL IntraVENous PRN    Or    dextrose bolus 10% 250 mL  250 mL IntraVENous PRN    glucagon injection 1 mg  1 mg IntraMUSCular PRN    dextrose 5 % solution  100 mL/hr IntraVENous PRN    metoprolol (LOPRESSOR) injection 5 mg  5 mg IntraVENous Q6H PRN    sodium chloride flush 0.9 % injection 3 mL  3 mL IntraVENous Q8H    DULoxetine (CYMBALTA) extended release capsule 120 mg  120 mg Oral Daily    levothyroxine (SYNTHROID) tablet 25 mcg  25 mcg Oral Daily    carbidopa-levodopa (SINEMET)  MG per tablet 2 tablet  2 tablet Oral TID    carboxymethylcellulose (THERATEARS) 1 % ophthalmic gel 1 drop  1 drop Both Eyes Q12H    ARIPiprazole (ABILIFY) tablet 15 mg  15 mg Oral Daily    topiramate (TOPAMAX) tablet 50 mg  50 mg Oral Daily    divalproex (DEPAKOTE SPRINKLE) capsule 250 mg  250 mg Oral 3 times per day    traZODone (DESYREL) tablet 25 mg  25 mg Oral Nightly    sodium chloride flush 0.9 % injection 5-40 mL  5-40 mL IntraVENous PRN    sodium chloride flush 0.9 % injection 5-40 mL  5-40 mL IntraVENous 2 times per day    sodium chloride flush 0.9 % injection 5-40 mL  5-40 mL IntraVENous PRN    0.9 % sodium chloride infusion   IntraVENous PRN    polyethylene glycol (GLYCOLAX) packet 17 g  17 g Oral Daily PRN    acetaminophen (TYLENOL) tablet 650 mg  650 mg Oral Q6H PRN    Or    acetaminophen (TYLENOL) suppository 650 mg  650 mg Rectal Q6H PRN    heparin (porcine) injection 5,000 Units  5,000 Units SubCUTAneous 3 times per day    hydrALAZINE (APRESOLINE) injection 20 mg  20 mg IntraVENous Q6H PRN    butalbital-acetaminophen-caffeine (FIORICET, ESGIC) per tablet 1 tablet  1 tablet Oral Q4H PRN       Signed:  Mario Alberto Payne MD    Part of this note may have been written by using a voice dictation software. The note has been proof read but may still contain some grammatical/other typographical errors.

## 2022-06-10 NOTE — PROGRESS NOTES
OCCUPATIONAL THERAPY Initial Assessment and Daily Note       OT Visit Days: 1  Acknowledge Orders  Time  OT Charge Capture  Rehab Caseload Tracker      Dick Porter is a 61 y.o. female   PRIMARY DIAGNOSIS: Acute metabolic encephalopathy  Delirium [R41.0]  Acute febrile illness [W07.6]  Acute metabolic encephalopathy [M26.59]       Reason for Referral: Generalized Muscle Weakness (M62.81)  Other lack of cordination (R27.8)  Inpatient: Payor: [employer] Peer / Plan: [facility] MEDICARE COMPLETE / Product Type: *No Product type* /     ASSESSMENT:     REHAB RECOMMENDATIONS:   Recommendation to date pending progress:  Setting:   No further skilled therapy after discharge from hospital    Equipment:     To Be Determined     ASSESSMENT:  Ms. Marilyn Gutierrez was admitted for the above issues. She has a history of Parkinson's Disease and remote history of CVA affecting the L yana-body per chart. She is a resident of [facility] and is typically bedbound. Ms. Marilyn Gutierrez presented in L sidelying (with wedge) and alert with sister at bedside. Patient is not speaking as much since hospitalization per patient's sister \"Samaria. \"  A CT of her head was done and was negative per chart. Ms. Marilyn Gutierrez stated her name \"Jacquelin,\" but did not answer other questions asked - she is hard of hearing and does not currently have hearing aides. Ms. Marilyn Gutierrez demonstrated functional use of her R UE to wipe her face and grasp a liquid thickener packet with her L hand. Ms. Marilyn Gutierrez grimaced when lowering her R LE to help roll her into R sidelying. She rolled R with assistance of CNA with total assistance. A pillow was placed under her R LE and wedge behind. Speech therapy has picked patient up. Plan to initiate occupational therapy on a trial basis since she assisted with self-feeding at [facility] to maximize her overall independence with ADL.       [employer] AM-PAC 6 Clicks Daily Activity Inpatient Short Form:    AM-PAC Daily Activity Inpatient   How much help for putting on and taking off regular lower body clothing?: Total  How much help for Bathing?: Total  How much help for Toileting?: Total  How much help for putting on and taking off regular upper body clothing?: Total  How much help for taking care of personal grooming?: A Lot  How much help for eating meals?: A Lot  AM-[facility] Inpatient Daily Activity Raw Score: 8  AM-PAC Inpatient ADL T-Scale Score : 22.86  ADL Inpatient CMS 0-100% Score: 85.69  ADL Inpatient CMS G-Code Modifier : CM           SUBJECTIVE:     Ms. Kalpesh Gurrola states, \"Jacquelin. \"     Social/Functional  See Assessment. OBJECTIVE:     Randi Mantis / Helane Arvind / AIRWAY: Francois Catheter    RESTRICTIONS/PRECAUTIONS:  Restrictions/Precautions: Fall Risk    PAIN: Aimee Sen / O2:   Pre Treatment:   Pain Assessment: 0-10  Pain Level:  (none at rest - grimaced when R LE was lowered - repositioned)      Post Treatment: 0       Vitals          Oxygen            GROSS EVALUATION: INTACT IMPAIRED   (See Comments)   UE AROM [] [x]L UE < R UE   UE PROM [] []   Strength [] LUE Strength  Gross LUE Strength: [facility]  LUE Strength Comment:  (Functional for holding a packet of thickener.)  RUE Strength  Gross RUE Strength: Exceptions to [facility]  RUE Strength Comment:  (Functional for washing her face)     Posture / Balance []     Sensation []     Coordination [] Fine motor impairments,Gross motor impairments,Decreased speed,Left UE     Tone []       Edema []    Activity Tolerance [] Patient Tolerated treatment well,Patient limited by pain     Hand Dominance R [] L []      COGNITION/  PERCEPTION: INTACT IMPAIRED   (See Comments)   Orientation []  Oriented to person.    Vision []     Hearing [] No hearing aid   Cognition  []     Perception []       MOBILITY: I Mod I S SBA CGA Min Mod Max Total  NT x2 Comments:   Bed Mobility    Rolling [] [] [] [] [] [] [] [] [x] [] [x]    Supine to Sit [] [] [] [] [] [] [] [] [] [x] []    Scooting [] [] [] [] [] [] [] [] [] [x] []    Sit to Supine [] [] [] [] [] [] [] [] [] [x] []    Transfers    Sit to Stand [] [] [] [] [] [] [] [] [] [x] []    Bed to Chair [] [] [] [] [] [] [] [] [] [x] []    Stand to Sit [] [] [] [] [] [] [] [] [] [x] []    Tub/Shower [] [] [] [] [] [] [] [] [] [x] []     Toilet [] [] [] [] [] [] [] [] [] [x] []      [] [] [] [] [] [] [] [] [] [] []    I=Independent, Mod I=Modified Independent, S=Supervision/Setup, SBA=Standby Assistance, CGA=Contact Guard Assistance, Min=Minimal Assistance, Mod=Moderate Assistance, Max=Maximal Assistance, Total=Total Assistance, NT=Not Tested    ACTIVITIES OF DAILY LIVING: I Mod I S SBA CGA Min Mod Max Total NT Comments   BASIC ADLs:              Upper Body Bathing  [] [] [] [] [] [] [] [] [] [x]    Lower Body Bathing [] [] [] [] [] [] [] [] [] [x]    Toileting [] [] [] [] [] [] [] [] [] [x]    Upper Body Dressing [] [] [] [] [] [] [] [] [] [x]    Lower Body Dressing [] [] [] [] [] [] [] [] [] [x]    Feeding [] [] [] [] [] [] [] [] [] [x]    Grooming [] [] [] [] [] [] [] [] [] [x]    Personal Device Care [] [] [] [] [] [] [] [] [] [x]    Functional Mobility [] [] [] [] [] [] [] [] [x] []    I=Independent, Mod I=Modified Independent, S=Supervision/Setup, SBA=Standby Assistance, CGA=Contact Guard Assistance, Min=Minimal Assistance, Mod=Moderate Assistance, Max=Maximal Assistance, Total=Total Assistance, NT=Not Tested    PLAN:     FREQUENCY/DURATION   OT Plan of Care: 2 times/week for duration of hospital stay or until stated goals are met, whichever comes first.    ACUTE OCCUPATIONAL THERAPY GOALS:   (Developed with and agreed upon by patient and/or caregiver.)  1. Complete grooming/oral care with minimal assistance. 2. Complete rolling with maximal assistance or less. 3. Complete self-feeding with moderate assistance or less. 4. Complete rolling for valerie-care with maximal assistance or less.    Time frame: 4 - 7 visits      PROBLEM LIST:   (Skilled intervention is medically necessary to address:)  Decreased ADL/Functional Activities  Decreased Activity Tolerance  Decreased AROM/PROM  Decreased Balance  Decreased Coordination  Decreased Strength  Decreased Transfer Abilities  Increased Pain   INTERVENTIONS PLANNED:  (Benefits and precautions of occupational therapy have been discussed with the patient.)  Self Care Training  Therapeutic Activity  Therapeutic Exercise/HEP  Neuromuscular Re-education  Manual Therapy  Education         TREATMENT:     EVALUATION: LOW COMPLEXITY: (Untimed Charge)    TREATMENT:   Self Care: (8): Procedure(s) (per grid) utilized to improve and/or restore self-care/home management as related to grooming. Required maximal visual, verbal, manual and tactile cueing to facilitate activities of daily living skills and compensatory activities. TREATMENT GRID:  N/A    AFTER TREATMENT PRECAUTIONS: All edu prominences off-loaded, Bed, Bed/Chair Locked, Call light within reach, Heels floated, Needs within reach, Side rails x2 and Visitors at bedside    INTERDISCIPLINARY COLLABORATION:  RN/ PCT and OT/ NUNEZ    EDUCATION:  Education Given To: Patient; Family  Education Provided: Role of Therapy  Barriers to Learning: Hearing    TOTAL TREATMENT DURATION AND TIME:  Time In: 1015  Time Out: [address]  Minutes: [id number] [address]

## 2022-06-10 NOTE — CARE COORDINATION
Chart reviewed by CM for discharge planning. Patient remains hospitalized at this time, with a LOS of 7 days. Patient's bed hold to  . [facility] with [facility], to alert CM if LOC is needed, if patient cannot return under Medicare plan. No additional discharge needs identified at this time. CM continues to follow care plan.

## 2022-06-11 ENCOUNTER — APPOINTMENT (OUTPATIENT)
Dept: MRI IMAGING | Age: 67
DRG: 689 | End: 2022-06-11
Payer: MEDICARE

## 2022-06-11 LAB
ANION GAP SERPL CALC-SCNC: 10 MMOL/L (ref 7–16)
BUN SERPL-MCNC: 2 MG/DL (ref 8–23)
CALCIUM SERPL-MCNC: 8.2 MG/DL (ref 8.3–10.4)
CHLORIDE SERPL-SCNC: 112 MMOL/L (ref 98–107)
CO2 SERPL-SCNC: 21 MMOL/L (ref 21–32)
CREAT SERPL-MCNC: 0.3 MG/DL (ref 0.6–1)
GLUCOSE BLD STRIP.AUTO-MCNC: 115 MG/DL (ref 65–100)
GLUCOSE BLD STRIP.AUTO-MCNC: 123 MG/DL (ref 65–100)
GLUCOSE BLD STRIP.AUTO-MCNC: 86 MG/DL (ref 65–100)
GLUCOSE BLD STRIP.AUTO-MCNC: 90 MG/DL (ref 65–100)
GLUCOSE SERPL-MCNC: 95 MG/DL (ref 65–100)
POTASSIUM SERPL-SCNC: 2.8 MMOL/L (ref 3.5–5.1)
SERVICE CMNT-IMP: ABNORMAL
SERVICE CMNT-IMP: ABNORMAL
SERVICE CMNT-IMP: NORMAL
SERVICE CMNT-IMP: NORMAL
SODIUM SERPL-SCNC: 143 MMOL/L (ref 136–145)

## 2022-06-11 PROCEDURE — 36415 COLL VENOUS BLD VENIPUNCTURE: CPT

## 2022-06-11 PROCEDURE — 2580000003 HC RX 258: Performed by: FAMILY MEDICINE

## 2022-06-11 PROCEDURE — 2580000003 HC RX 258: Performed by: INTERNAL MEDICINE

## 2022-06-11 PROCEDURE — 6360000002 HC RX W HCPCS: Performed by: INTERNAL MEDICINE

## 2022-06-11 PROCEDURE — 82962 GLUCOSE BLOOD TEST: CPT

## 2022-06-11 PROCEDURE — 6360000002 HC RX W HCPCS: Performed by: FAMILY MEDICINE

## 2022-06-11 PROCEDURE — 80048 BASIC METABOLIC PNL TOTAL CA: CPT

## 2022-06-11 PROCEDURE — 6370000000 HC RX 637 (ALT 250 FOR IP): Performed by: INTERNAL MEDICINE

## 2022-06-11 PROCEDURE — 6370000000 HC RX 637 (ALT 250 FOR IP): Performed by: FAMILY MEDICINE

## 2022-06-11 PROCEDURE — 99232 SBSQ HOSP IP/OBS MODERATE 35: CPT | Performed by: PSYCHIATRY & NEUROLOGY

## 2022-06-11 PROCEDURE — 1100000000 HC RM PRIVATE

## 2022-06-11 PROCEDURE — 2580000003 HC RX 258: Performed by: EMERGENCY MEDICINE

## 2022-06-11 RX ORDER — POTASSIUM CHLORIDE 7.45 MG/ML
10 INJECTION INTRAVENOUS
Status: COMPLETED | OUTPATIENT
Start: 2022-06-11 | End: 2022-06-11

## 2022-06-11 RX ADMIN — POTASSIUM CHLORIDE 10 MEQ: 7.46 INJECTION, SOLUTION INTRAVENOUS at 14:00

## 2022-06-11 RX ADMIN — CARBOXYMETHYLCELLULOSE SODIUM 1 DROP: 10 GEL OPHTHALMIC at 10:12

## 2022-06-11 RX ADMIN — CARBIDOPA AND LEVODOPA 2 TABLET: 25; 100 TABLET ORAL at 10:11

## 2022-06-11 RX ADMIN — ARIPIPRAZOLE 15 MG: 5 TABLET ORAL at 10:11

## 2022-06-11 RX ADMIN — NYSTATIN 500000 UNITS: 100000 SUSPENSION ORAL at 12:30

## 2022-06-11 RX ADMIN — HEPARIN SODIUM 5000 UNITS: 5000 INJECTION INTRAVENOUS; SUBCUTANEOUS at 21:04

## 2022-06-11 RX ADMIN — CARBIDOPA AND LEVODOPA 2 TABLET: 25; 100 TABLET ORAL at 21:27

## 2022-06-11 RX ADMIN — NYSTATIN 500000 UNITS: 100000 SUSPENSION ORAL at 17:28

## 2022-06-11 RX ADMIN — HEPARIN SODIUM 5000 UNITS: 5000 INJECTION INTRAVENOUS; SUBCUTANEOUS at 05:06

## 2022-06-11 RX ADMIN — DULOXETINE HYDROCHLORIDE 120 MG: 30 CAPSULE, DELAYED RELEASE ORAL at 10:11

## 2022-06-11 RX ADMIN — SODIUM CHLORIDE, PRESERVATIVE FREE 10 ML: 5 INJECTION INTRAVENOUS at 10:59

## 2022-06-11 RX ADMIN — DIVALPROEX SODIUM 250 MG: 125 CAPSULE ORAL at 15:17

## 2022-06-11 RX ADMIN — SODIUM CHLORIDE: 900 INJECTION, SOLUTION INTRAVENOUS at 21:34

## 2022-06-11 RX ADMIN — ROSUVASTATIN CALCIUM 20 MG: 10 TABLET, FILM COATED ORAL at 21:05

## 2022-06-11 RX ADMIN — DIVALPROEX SODIUM 250 MG: 125 CAPSULE ORAL at 21:05

## 2022-06-11 RX ADMIN — NYSTATIN 500000 UNITS: 100000 SUSPENSION ORAL at 21:04

## 2022-06-11 RX ADMIN — SODIUM CHLORIDE, PRESERVATIVE FREE 10 ML: 5 INJECTION INTRAVENOUS at 20:25

## 2022-06-11 RX ADMIN — NYSTATIN 500000 UNITS: 100000 SUSPENSION ORAL at 10:11

## 2022-06-11 RX ADMIN — POTASSIUM CHLORIDE 40 MEQ: 20 TABLET, EXTENDED RELEASE ORAL at 17:28

## 2022-06-11 RX ADMIN — TOPIRAMATE 50 MG: 50 TABLET ORAL at 10:11

## 2022-06-11 RX ADMIN — POTASSIUM CHLORIDE 40 MEQ: 20 TABLET, EXTENDED RELEASE ORAL at 12:13

## 2022-06-11 RX ADMIN — DIVALPROEX SODIUM 250 MG: 125 CAPSULE ORAL at 05:06

## 2022-06-11 RX ADMIN — ASPIRIN 325 MG ORAL TABLET 325 MG: 325 PILL ORAL at 10:11

## 2022-06-11 RX ADMIN — TRAZODONE HYDROCHLORIDE 25 MG: 50 TABLET ORAL at 21:05

## 2022-06-11 RX ADMIN — CARBOXYMETHYLCELLULOSE SODIUM 1 DROP: 10 GEL OPHTHALMIC at 21:06

## 2022-06-11 RX ADMIN — POTASSIUM CHLORIDE 10 MEQ: 7.46 INJECTION, SOLUTION INTRAVENOUS at 12:13

## 2022-06-11 RX ADMIN — SODIUM CHLORIDE: 900 INJECTION, SOLUTION INTRAVENOUS at 04:37

## 2022-06-11 RX ADMIN — SODIUM CHLORIDE, PRESERVATIVE FREE 3 ML: 5 INJECTION INTRAVENOUS at 02:03

## 2022-06-11 RX ADMIN — SODIUM CHLORIDE, PRESERVATIVE FREE 3 ML: 5 INJECTION INTRAVENOUS at 17:36

## 2022-06-11 RX ADMIN — LEVOTHYROXINE SODIUM 25 MCG: 0.05 TABLET ORAL at 05:06

## 2022-06-11 RX ADMIN — CARBIDOPA AND LEVODOPA 2 TABLET: 25; 100 TABLET ORAL at 15:17

## 2022-06-11 RX ADMIN — HEPARIN SODIUM 5000 UNITS: 5000 INJECTION INTRAVENOUS; SUBCUTANEOUS at 15:17

## 2022-06-11 ASSESSMENT — PAIN SCALES - WONG BAKER: WONGBAKER_NUMERICALRESPONSE: 0

## 2022-06-11 ASSESSMENT — PAIN SCALES - GENERAL: PAINLEVEL_OUTOF10: 0

## 2022-06-11 NOTE — PROGRESS NOTES
T-Scale Score : 23.55  Mobility Inpatient CMS 0-100% Score: 100  Mobility Inpatient CMS G-Code Modifier : CN    SUBJECTIVE:   Ms. Alexi Fair is alert but nonresponsive today. Social/Functional Lives With: Other (comment) (long term care staff)  Type of Home: Assisted living (long term care facility)    OBJECTIVE:     PAIN: Cathay Miller / O2: PRECAUTION / Marek Weston / DRAINS:   Pre Treatment:    Mayanam Desai   No pain       Post Treatment: 0 Vitals        Oxygen      Francois Catheter    RESTRICTIONS/PRECAUTIONS:  Restrictions/Precautions: Fall Risk                 GROSS EVALUATION: Intact Impaired (Comments):   AROM []   limited    PROM []    Strength []  limited   Balance []  poor    Posture [] Forward Head  Rounded Shoulders  Thoracic Kyphosis   Sensation []  unknown    Coordination []   limited    Tone []  abnormal    Edema [] Abnormal    Activity Tolerance []  poor     []      COGNITION/  PERCEPTION: Intact Impaired (Comments):   Orientation []   unknown    Vision [x]     Hearing [x]     Cognition  []  alert but non verbal or responding.       MOBILITY: I Mod I S SBA CGA Min Mod Max Total  NT x2 Comments:   Bed Mobility    Rolling [] [] [] [] [] [] [] [] [x] [] []    Supine to Sit [] [] [] [] [] [] [] [] [x] [] []    Scooting [] [] [] [] [] [] [] [] [x] [] []    Sit to Supine [] [] [] [] [] [] [] [] [x] [] []    Transfers    Sit to Stand [] [] [] [] [] [] [] [] [] [x] []    Bed to Chair [] [] [] [] [] [] [] [] [] [x] []    Stand to Sit [] [] [] [] [] [] [] [] [] [x] []     [] [] [] [] [] [] [] [] [] [x] []    I=Independent, Mod I=Modified Independent, S=Supervision, SBA=Standby Assistance, CGA=Contact Guard Assistance,   Min=Minimal Assistance, Mod=Moderate Assistance, Max=Maximal Assistance, Total=Total Assistance, NT=Not Tested    GAIT: I Mod I S SBA CGA Min Mod Max Total  NT x2 Comments:   Level of Assistance [] [] [] [] [] [] [] [] [] [x] []    Distance 0 feet    DME N/A    Gait Quality N/A    Weightbearing Status      Stairs I=Independent, Mod I=Modified Independent, S=Supervision, SBA=Standby Assistance, CGA=Contact Guard Assistance,   Min=Minimal Assistance, Mod=Moderate Assistance, Max=Maximal Assistance, Total=Total Assistance, NT=Not Tested    PLAN:   ACUTE PHYSICAL THERAPY GOALS:   (Developed with and agreed upon by patient and/or caregiver.)  1. Instruction and management for family member for scooting in bed via hercules and bed to chair position to prevent pneumonia in one visit. Goal met 6/11/2022    FREQUENCY AND DURATION:  (none eval short treatment and discharge at baseline) for duration of hospital stay or until stated goals are met, whichever comes first.    THERAPY PROGNOSIS: at baseline    PROBLEM LIST:   (Skilled intervention is medically necessary to address:)  Decreased Transfer Abilities INTERVENTIONS PLANNED:   (Benefits and precautions of physical therapy have been discussed with the patient.)  Therapeutic Activity       TREATMENT:   EVALUATION: LOW COMPLEXITY: (Untimed Charge)    TREATMENT:   Therapeutic Activity (8 Minutes): Therapeutic activity included Rolling, Scooting, Lateral Scooting, Transfer Training and bed to chair position to improve functional Activity tolerance, Balance, Coordination, Mobility and Strength. TREATMENT GRID:  N/A    AFTER TREATMENT PRECAUTIONS: Alarm Activated, Bed, Call light within reach, Needs within reach, Side rails x3 and Visitors at bedside    INTERDISCIPLINARY COLLABORATION:  RN/ PCT and PT/ PTA    EDUCATION: Education Given To: Family;Caregiver  Education Provided: Transfer Training  Education Provided Comments:  (bed to chair position)  Education Method: Demonstration  Barriers to Learning: Cognition; Hearing;Vision  Education Outcome: Other (Comment) (family verbalized understanding )    TIME IN/OUT:  Time In: 1200  Time Out: [place]  Minutes: [facility], [address] [facility]

## 2022-06-11 NOTE — PROGRESS NOTES
Neurology Daily Progress Note     Assessment:     82-year-old woman with a history of parkinsonism who has worsening cognition in the setting of urinary tract infection and low cognitive reserve. The patient likely has underlying dementia. Patients with dementia have worsening cognition when hospitalized, especially in the setting of a urinary tract infection, and typically recovery is over a period of weeks. We will obtain a brain MRI to rule out acute pathology, but her clinical course is not unexpected. Plan:     MRI of the brain    The patient is deaf. She can read lips and does respond. Management of Delirium     Non-pharmacological intervention  · Reorient the patient throughout the day  · Window open and lights on during the day. Lights off, television off, noises down during the night. If able, decrease nursing checks at night  · Therapies as often as possible  · Avoid restraints to the best of your ability   · Avoid sensory deprivation by using glasses and hearing aids, if applicable       Pharmacological intervention  · Replete electrolyte abnormalities and correct metabolic abnormalities  · Limit benzodiazepines, antihistamines, narcotics, anticholinergics. Preference towards Precedex for sedation over fentanyl and benzodiazepines/GABAa agonists. · For dangerous behavior/aggression to self or others can consider Zyprexa or Seroquel if benefits outweigh risk  · For persistent insomnia can use melatonin four hours prior to bedtime or Seroquel 25 mg at bedtime       Subjective: Interval history:    The patient was asleep when I entered the room. She was difficult to arouse but then made eye contact. The patient is deaf so it is difficult for her to follow commands but she seemed alert    History:    Krista Abdullahi is a 61 y.o. female who is being seen for decreased level of responsiveness. Review of systems negative with exception of pertinent positives and negatives noted above. Objective:     Vitals:    06/10/22 1939 06/11/22 0000 06/11/22 0349 06/11/22 0804   BP: (!) 150/98 (!) 143/95 (!) 145/79 (!) 119/91   Pulse: (!) 101 99 (!) 108 99   Resp: 18 16 18 18   Temp: 98.2 °F (36.8 °C) 98.1 °F (36.7 °C) 97.7 °F (36.5 °C) 97.8 °F (36.6 °C)   TempSrc: Oral Oral Oral Axillary   SpO2: 97% 99% 98%    Weight:       Height:              Current Facility-Administered Medications:     insulin lispro (HUMALOG) injection vial 0-4 Units, 0-4 Units, SubCUTAneous, TID WC, Ilia Stanley MD    insulin lispro (HUMALOG) injection vial 0-4 Units, 0-4 Units, SubCUTAneous, Nightly, Ilia Stanley MD    pantoprazole (PROTONIX) tablet 40 mg, 40 mg, Oral, QAM AC, Ilia Stanley MD, 40 mg at 06/10/22 0519    [Held by provider] potassium chloride (KLOR-CON M) extended release tablet 40 mEq, 40 mEq, Oral, TID WC, Ilia Stanley MD, 40 mEq at 06/07/22 1701    nystatin (MYCOSTATIN) 100792 UNIT/ML suspension 500,000 Units, 5 mL, Oral, 4x Daily, Rachel Bradshaw MD, 500,000 Units at 06/11/22 1011    aspirin tablet 325 mg, 325 mg, Oral, Daily, Ilia Stanley MD, 325 mg at 06/11/22 1011    rosuvastatin (CRESTOR) tablet 20 mg, 20 mg, Oral, Nightly, Rachel Bradshaw MD, 20 mg at 06/10/22 2103    0.9 % sodium chloride infusion, , IntraVENous, Continuous, Ilia Stanley MD, Last Rate: 75 mL/hr at 06/11/22 0437, New Bag at 06/11/22 0437    glucose chewable tablet 16 g, 4 tablet, Oral, PRN, Ilia Stanley MD    dextrose bolus 10% 125 mL, 125 mL, IntraVENous, PRN **OR** dextrose bolus 10% 250 mL, 250 mL, IntraVENous, PRN, Ilia Stanley MD    glucagon injection 1 mg, 1 mg, IntraMUSCular, PRN, Ilia Stanley MD    dextrose 5 % solution, 100 mL/hr, IntraVENous, PRN, Ilia Stanley MD    metoprolol (LOPRESSOR) injection 5 mg, 5 mg, IntraVENous, Q6H PRN, Orin Swanson MD, 5 mg at 06/05/22 0631    Saline lock IV, , , Continuous **AND** sodium chloride flush 0.9 % injection 3 mL, 3 mL, IntraVENous, Q8H, Jelani Andres MD, 3 mL at 06/11/22 0203    DULoxetine (CYMBALTA) extended release capsule 120 mg, 120 mg, Oral, Daily, Randi Omer, DO, 120 mg at 06/11/22 1011    levothyroxine (SYNTHROID) tablet 25 mcg, 25 mcg, Oral, Daily, Randi Omer, DO, 25 mcg at 06/11/22 0506    carbidopa-levodopa (SINEMET)  MG per tablet 2 tablet, 2 tablet, Oral, TID, Randi Omer, DO, 2 tablet at 06/11/22 1011    carboxymethylcellulose (THERATEARS) 1 % ophthalmic gel 1 drop, 1 drop, Both Eyes, Q12H, Randi Omer, DO, 1 drop at 06/11/22 1012    ARIPiprazole (ABILIFY) tablet 15 mg, 15 mg, Oral, Daily, Randi Omer, DO, 15 mg at 06/11/22 1011    topiramate (TOPAMAX) tablet 50 mg, 50 mg, Oral, Daily, Randi Omer, DO, 50 mg at 06/11/22 1011    divalproex (DEPAKOTE SPRINKLE) capsule 250 mg, 250 mg, Oral, 3 times per day, Randi Omer, DO, 250 mg at 06/11/22 0506    traZODone (DESYREL) tablet 25 mg, 25 mg, Oral, Nightly, Randi Omer, DO, 25 mg at 06/10/22 2102    sodium chloride flush 0.9 % injection 5-40 mL, 5-40 mL, IntraVENous, PRN, Jelani Andres MD    sodium chloride flush 0.9 % injection 5-40 mL, 5-40 mL, IntraVENous, 2 times per day, Randi Omer, DO, 10 mL at 06/10/22 2104    sodium chloride flush 0.9 % injection 5-40 mL, 5-40 mL, IntraVENous, PRN, Randi Omer, DO    0.9 % sodium chloride infusion, , IntraVENous, PRN, Randi Omer, DO    polyethylene glycol (GLYCOLAX) packet 17 g, 17 g, Oral, Daily PRN, Randi Omer, DO    acetaminophen (TYLENOL) tablet 650 mg, 650 mg, Oral, Q6H PRN **OR** acetaminophen (TYLENOL) suppository 650 mg, 650 mg, Rectal, Q6H PRN, Radni Omer, DO    heparin (porcine) injection 5,000 Units, 5,000 Units, SubCUTAneous, 3 times per day, Randi Omer, DO, 5,000 Units at 06/11/22 0506    hydrALAZINE (APRESOLINE) injection 20 mg, 20 mg, IntraVENous, Q6H PRN, Randi Omer, DO, 20 mg at 06/05/22 0302    butalbital-acetaminophen-caffeine (FIORICET, ESGIC) per tablet 1 tablet, 1 tablet, Oral, Q4H PRN, Jaja Barajas DO    Recent Results (from the past 12 hour(s))   POCT Glucose    Collection Time: 06/11/22  7:48 AM   Result Value Ref Range    POC Glucose 90 65 - 100 mg/dL    Performed by: Desean Reynoso    Basic Metabolic Panel    Collection Time: 06/11/22  8:47 AM   Result Value Ref Range    Sodium 143 136 - 145 mmol/L    Potassium 2.8 (L) 3.5 - 5.1 mmol/L    Chloride 112 (H) 98 - 107 mmol/L    CO2 21 21 - 32 mmol/L    Anion Gap 10 7 - 16 mmol/L    Glucose 95 65 - 100 mg/dL    BUN 2 (L) 8 - 23 MG/DL    CREATININE 0.30 (L) 0.6 - 1.0 MG/DL    GFR African American >60 >60 ml/min/1.73m2    GFR Non- >60 >60 ml/min/1.73m2    Calcium 8.2 (L) 8.3 - 10.4 MG/DL         Physical Exam:  General -: Chronically disabled. Appears older than stated age  [de-identified] - Normocephalic, atraumatic. Conjunctiva, tympanic membranes, and oropharynx are clear. Neck - Supple without masses, no bruits   Cardiovascular - Regular rate and rhythm. Normal S1, S2 without murmurs, rubs, or gallops. Lungs - Clear to auscultation. Abdomen - Soft, nontender with normal bowel sounds. Extremities - Peripheral pulses intact. No edema and no rashes.      Neurological examination -     Comprehension is intact to one-step commands. She struggles to follow more complex commands. Attention is fair. Language and speech are limited. She does say short phrases. . On cranial nerve examination pupils are equal round and reactive to light. Visual fields are full to finger confrontation. Extraocular motility is normal. Face is symmetric and sensation is intact to light touch. Hearing is intact to finger rustle bilaterally. Motor examination - There is normal muscle tone and bulk with exception to increased muscle tone in the left upper limb which is mild.   In the bilateral upper extremities she has 4+/5 strength in the right upper limb and 4 -/5 strength in the left upper limb. Some spontaneous movements in the bilateral lower limbs, perhaps 2/5 strength. Muscle stretch reflexes are absent throughout.   She could not participate in sensory examination, cerebellar examination, gait or stance due to her medical condition.           Signed By: Teodora Ibarra DO     June 11, 2022

## 2022-06-11 NOTE — PROGRESS NOTES
Hospitalist Progress Note   Admit Date:  6/3/2022 10:16 AM   Name:  Master Soto   Age:  61 y.o. Sex:  female  :  1955   MRN:  [id number]   Room:  [facility]    Presenting Complaint: Altered Mental Status     Reason(s) for Admission: Delirium [R41.0]  Acute febrile illness [I89.2]  Acute metabolic encephalopathy [J31.68]     Hospital Course & Interval History:     Patient with past medical history of  Previous CVA with residual left-sided weakness  Bed-bound  Patient has no teeth but able to eat regular food. CAD  Type 2 diabetes mellitus   history of pulmonary embolism, not on anticoagulation now. Anxiety  Hypothyroidism  Chronic pain  Parkinson disease    Patient was brought to hospital from nursing facility due to change in mental status. Normally patient could speak without issues. No detailed history is available. Patient was found to have elevated blood pressure, with heart rate of 111/min. Potassium was low, procalcitonin was low. Viral panel was negative. UA is positive for nitrites and 4+ bacteria, no white blood cells however. CT head and chest x-ray showed no significant acute findings. Patient was admitted due to change in mental status and for treatment of UTI. Subjective/24hr Events (22):    22   Patient is lying in bed and opens eyes when she is called and stimulated. She does not answer questions. No fever. No shaking. No chills. No report of shortness of breath. No report of chest pain. No diarrhea. No blood per rectum. No blood per urine. 22   Patient is mostly lying in bed and not talking. She does not respond to verbal stimuli. No fever in the last 24 hours. No chest pain. No shortness of breath. Patient is assessed by speech therapist and is allowed to have minced and moist diet now. 22   Patient is lying in bed and does not respond much verbally to stimuli. No fever. No shaking. No chills.    No shortness of breath. 6/7/22   Patient seems to open eyes more easily when she is called. No fever. No shaking. No chills. No chest pain. No shortness of breath. 6/8/22   Patient is resting in bed well. Not much interaction with me when she is called. No fever. No shaking. No chills. No chest pain. No shortness of breath. 6/9/22   Patient is lying comfortably in bed. She opens eyes with loud verbal stimuli. No fever. No shortness of breath. No chest pain. 6/10/22   Patient is resting in bed and does not talk back when asked. No fever. No shaking. No chills. No shortness of breath. No chest pain. 6/11/22   Patient was assessed by neurologist.   Augie Cadena is that it is not unexpected for patient of her age and with underlying dementia, to have had acute metabolic encephalopathy from UTI and it could take some time before she recovers from it. No fever. No shaking. No chills. No shortness of breath. Assessment & Plan:     Principal Problem:    Acute metabolic encephalopathy    UTI (urinary tract infection)  Patient has completed empiric ceftriaxone. Blood culture is negative. Urine culture grew E.coli which is pan-sensitive. Speech therapist deems patient suitable for pureed diet. Mentation does not improve much although she seems more responsive to stimuli. Still not engaging in conversation. I am informed that patient was \"talking\" before coming to hospital.   CT brain on Meagan 3, 2022 did not show acute findings. Continue close monitoring. Neurologist has given impression that it may take time for patient to recover from acute metabolic encephalopathy. Active Problems:    Diabetes mellitus ([address]  Monitor blood sugar. Cover with insulin sliding scale accordingly. Blood sugar is mostly in lower than 100 ranges. That is acceptable. Humalog sliding scale coverage with low-dose regimen.        RYAN (obstructive sleep apnea)  Noted Hypokalemia  Continues to have hypokalemia   Aggressive potassium supplementation  K is normalized. Stroke [facility])  Previous stroke  Continue with aspirin, rosuvastatin. HTN (hypertension)  Blood pressure is mostly in acceptable ranges. Will monitor closely. May need to start antihypertensive medication gingerly. On PRN medication, Hydralazine. BP is 150/96 mmHg. Discharge Planning:    Likely can go back to long-term care facility where she came from when she is improved. Bed is held until June 12, 2022. Diet:  ADULT DIET; Dysphagia - Pureed; Mildly Thick (Nectar)  DVT PPx: heparin SC   Code status: Full Code    Hospital Problems:  Principal Problem:    Acute metabolic encephalopathy  Active Problems:    UTI (urinary tract infection)    Diabetes mellitus (HCC)    RYAN (obstructive sleep apnea)    Hypokalemia    Stroke (HCC)    HTN (hypertension)  Resolved Problems:    * No resolved hospital problems. *      Objective:     Patient Vitals for the past 24 hrs:   Temp Pulse Resp BP SpO2   06/11/22 0804 97.8 °F (36.6 °C) 99 18 (!) 119/91 --   06/11/22 0349 97.7 °F (36.5 °C) (!) 108 18 (!) 145/79 98 %   06/11/22 0000 98.1 °F (36.7 °C) 99 16 (!) 143/95 99 %   06/10/22 1939 98.2 °F (36.8 °C) (!) 101 18 (!) 150/98 97 %   06/10/22 1618 98.2 °F (36.8 °C) (!) 103 19 139/75 96 %       Oxygen Therapy  SpO2: 98 %  Pulse Oximeter Device Mode: Intermittent  O2 Device: None (Room air)  O2 Flow Rate (L/min): 2 L/min  O2 Delivery Method: Nasal cannula    Estimated body mass index is 27.85 kg/m² as calculated from the following:    Height as of this encounter: 5' 4\" (1.626 m). Weight as of this encounter: 162 lb 4.1 oz (73.6 kg).     Intake/Output Summary (Last 24 hours) at 6/11/2022 0910  Last data filed at 6/11/2022 0551  Gross per 24 hour   Intake 120 ml   Output 2200 ml   Net -2080 ml         Physical Exam:     Blood pressure (!) 119/91, pulse 99, temperature 97.8 °F (36.6 °C), temperature source Axillary, resp. rate 18, height 5' 4\" (1.626 m), weight 162 lb 4.1 oz (73.6 kg), SpO2 98 %. General:    Well nourished. Patient is lying flat quietly in bed. She opens eyes when she is called loudly. She does not answer questions. She does not answer her name when she was asked about it. She keeps eye contact sometimes however. Head:  Normocephalic, atraumatic, not pale. No icterus. Eyes:  Sclerae appear normal.  Pupils equally round. ENT:  Nares appear normal, no drainage. Moist oral mucosa  Neck:  No restricted ROM. Trachea midline   CV:   RRR. No m/r/g. No jugular venous distension. Lungs:   CTAB. No wheezing, rhonchi, or rales. Respirations even, unlabored  Abdomen: Bowel sounds present. Soft, nontender, nondistended. Extremities: No cyanosis or clubbing. No edema  Skin:     No rashes and normal coloration. Warm and dry. Neuro:  As mentioned above, patient responded minimally to verbal stimuli. Does not seem to have localized weakness  No spasticity of her extremities.        I have reviewed ordered lab tests and independently visualized imaging below:    Recent Labs:  Recent Results (from the past 48 hour(s))   POCT Glucose    Collection Time: 06/09/22 11:22 AM   Result Value Ref Range    POC Glucose 117 (H) 65 - 100 mg/dL    Performed by: [employer]    POCT Glucose    Collection Time: 06/09/22  3:55 PM   Result Value Ref Range    POC Glucose 110 (H) 65 - 100 mg/dL    Performed by: [employer]    POCT Glucose    Collection Time: 06/09/22  9:00 PM   Result Value Ref Range    POC Glucose 85 65 - 100 mg/dL    Performed by: Elias    POCT Glucose    Collection Time: 06/10/22  2:03 AM   Result Value Ref Range    POC Glucose 95 65 - 100 mg/dL    Performed by: Elias    POCT Glucose    Collection Time: 06/10/22  8:03 AM   Result Value Ref Range    POC Glucose 84 65 - 100 mg/dL    Performed by: Ilene    POCT Glucose    Collection Time: 06/10/22 11:55 AM   Result Value Ref Range    POC Glucose 112 (H) 65 - 100 mg/dL    Performed by: Ilene    POCT Glucose    Collection Time: 06/10/22  4:18 PM   Result Value Ref Range    POC Glucose 114 (H) 65 - 100 mg/dL    Performed by: Ilene    POCT Glucose    Collection Time: 06/10/22  8:49 PM   Result Value Ref Range    POC Glucose 99 65 - 100 mg/dL    Performed by: David    POCT Glucose    Collection Time: 06/11/22  7:48 AM   Result Value Ref Range    POC Glucose 90 65 - 100 mg/dL    Performed by: Ilene        Other Studies:  CT HEAD WO CONTRAST    Result Date: 6/3/2022  CT of the head without contrast. CLINICAL INDICATION: Lethargy, altered mental status PROCEDURE: Serial thin section axial images are obtained from the cranial vertex through the skull base without the administration of intravenous contrast. Radiation dose reduction techniques were used for this study. Our CT scanners use one or all of the following: Automated exposure control, adjusted of the mA and/or kV according to patient size, iterative reconstruction COMPARISON: Head CT dated 2/15/2013. FINDINGS: There is no acute intracranial hemorrhage, mass, or mass effect. No abnormal extra-axial fluid collections identified. There is no hydrocephalus. The basilar cisterns are widely patent. Extensive bilateral patchy white matter hypoattenuation again noted. It is unchanged. No findings present to indicate large, cortical-based territorial infarction. There is a small old right basal ganglia lacunar infarct. No skull fracture or aggressive osseous lesion noted. Thick mucoperiosteal thickening is noted in the right maxillary sinus. The mastoid air cells are clear. 1. No acute intracranial abnormality. 2. Extensive bilateral white matter hypoattenuation most in keeping with chronic microangiopathic disease. 3. Chronic appearing mucoperiosteal thickening in the right maxillary sinus.     XR CHEST PORTABLE    Result Date: 6/3/2022  Portable chest x-ray CLINICAL INDICATION: Altered mental status. FINDINGS: Single AP view the chest compared to a similar exam dated 8/17/2006 shows mild subsegmental atelectasis in the right midlung. Otherwise the lung parenchyma is clear. There is no pleural effusion or pneumothorax. The cardiac silhouette and mediastinum are unremarkable. Mild subsegmental atelectasis in the right midlung. Otherwise no acute abnormality.       Current Meds:  Current Facility-Administered Medications   Medication Dose Route Frequency    insulin lispro (HUMALOG) injection vial 0-4 Units  0-4 Units SubCUTAneous TID WC    insulin lispro (HUMALOG) injection vial 0-4 Units  0-4 Units SubCUTAneous Nightly    pantoprazole (PROTONIX) tablet 40 mg  40 mg Oral QAM AC    [Held by provider] potassium chloride (KLOR-CON M) extended release tablet 40 mEq  40 mEq Oral TID WC    nystatin (MYCOSTATIN) 042687 UNIT/ML suspension 500,000 Units  5 mL Oral 4x Daily    aspirin tablet 325 mg  325 mg Oral Daily    rosuvastatin (CRESTOR) tablet 20 mg  20 mg Oral Nightly    0.9 % sodium chloride infusion   IntraVENous Continuous    glucose chewable tablet 16 g  4 tablet Oral PRN    dextrose bolus 10% 125 mL  125 mL IntraVENous PRN    Or    dextrose bolus 10% 250 mL  250 mL IntraVENous PRN    glucagon injection 1 mg  1 mg IntraMUSCular PRN    dextrose 5 % solution  100 mL/hr IntraVENous PRN    metoprolol (LOPRESSOR) injection 5 mg  5 mg IntraVENous Q6H PRN    sodium chloride flush 0.9 % injection 3 mL  3 mL IntraVENous Q8H    DULoxetine (CYMBALTA) extended release capsule 120 mg  120 mg Oral Daily    levothyroxine (SYNTHROID) tablet 25 mcg  25 mcg Oral Daily    carbidopa-levodopa (SINEMET)  MG per tablet 2 tablet  2 tablet Oral TID    carboxymethylcellulose (THERATEARS) 1 % ophthalmic gel 1 drop  1 drop Both Eyes Q12H    ARIPiprazole (ABILIFY) tablet 15 mg  15 mg Oral Daily    topiramate (TOPAMAX) tablet 50 mg  50 mg Oral Daily    divalproex (DEPAKOTE SPRINKLE) capsule 250 mg  250 mg Oral 3 times per day    traZODone (DESYREL) tablet 25 mg  25 mg Oral Nightly    sodium chloride flush 0.9 % injection 5-40 mL  5-40 mL IntraVENous PRN    sodium chloride flush 0.9 % injection 5-40 mL  5-40 mL IntraVENous 2 times per day    sodium chloride flush 0.9 % injection 5-40 mL  5-40 mL IntraVENous PRN    0.9 % sodium chloride infusion   IntraVENous PRN    polyethylene glycol (GLYCOLAX) packet 17 g  17 g Oral Daily PRN    acetaminophen (TYLENOL) tablet 650 mg  650 mg Oral Q6H PRN    Or    acetaminophen (TYLENOL) suppository 650 mg  650 mg Rectal Q6H PRN    heparin (porcine) injection 5,000 Units  5,000 Units SubCUTAneous 3 times per day    hydrALAZINE (APRESOLINE) injection 20 mg  20 mg IntraVENous Q6H PRN    butalbital-acetaminophen-caffeine (FIORICET, ESGIC) per tablet 1 tablet  1 tablet Oral Q4H PRN       Signed:  Natalia Silva MD    Part of this note may have been written by using a voice dictation software. The note has been proof read but may still contain some grammatical/other typographical errors.

## 2022-06-11 NOTE — PROGRESS NOTES
Comprehensive Nutrition Assessment    Type and Reason for Visit: Initial,RD Nutrition Re-Screen/LOS  LOS Day8    Nutrition Recommendations/Plan:   Meals and Snacks:  Diet: Continue current order  Nutrition Supplement Therapy:   Medical food supplement therapy:  Initiate Magic Cup twice per day (this provides 290 kcal and 9 grams protein per serving)     Malnutrition Assessment:  Malnutrition Status: No malnutrition    Nutrition Assessment:  Nutrition History: Spoke with sister as patient was sleeping during visit. Sister reports that prior to admission patient ate well. She reports patient is a picky eater and has specific items she likes to eat due to lack of teeth. Sister denies any noticeable weight loss. Do You Have Any Cultural, Orthodoxy, or Ethnic Food Preferences?: No   Nutrition Background:   PMH: DM2, CAD, prior CVA, dementia, parkinson's. Patient presented from nursing home with altered mentation and UTI. Patient with metabolic encephalopathy. Patient is also deaf. Nutrition Interval:  Diet modified following further changes to mentation. Sister reports that patient is eating well on and off. She did good yesterday afternoon and evening asked for items to eat and ate them. Sister reports no nausea or vomiting since admission. Sister very agreeable to having magic cup as supplement. Per RN patient has been eating slightly better the last few days but still can have meals where she eats nothing (breakfast today). Nutrition Related Findings:   No signs of malnutrition Wound Type: None    Current Nutrition Therapies:  ADULT DIET;  Dysphagia - Pureed; Mildly Thick (Nectar)    Current Intake:   Average Meal Intake:  (variable per RN and family) Average Supplements Intake: None Ordered      Anthropometric Measures:  Height: 5' 4\" (162.6 cm)  Current Body Wt: 162 lb 4.1 oz (73.6 kg) (5/8), Weight source: Bed Scale  BMI: 27.8  Admission Body Weight: 151 lb (68.5 kg) (not specified, 6/3)  Ideal Body Weight (Kg) (Calculated): 55 kg (120 lbs), 135.2 %  Usual Body Wt:  , Percent weight change:         Edema: No data recorded  BMI Category Overweight (BMI 25.0-29. 9)  Estimated Daily Nutrient Needs:  Energy (kcal/day): 3096-0113 (20-25) (Kcal/kg Weight used: 73.6 kg Current  Protein (g/day): 74-88 (1-1.2 g/kg) Weight Used: (Current) 73.6 kg  Fluid (ml/day):   (1 ml/kcal)    Nutrition Diagnosis:   · Inadequate oral intake related to biting/chewing (masticatory) difficulty (mentation) as evidenced by intake 26-50%,intake 51-75%,poor dentition    Nutrition Interventions:   Food and/or Nutrient Delivery: Continue Current Diet,Start Oral Nutrition Supplement     Coordination of Nutrition Care: Continue to monitor while inpatient  Plan of Care discussed with: RN    Goals: Active Goal: Meet at least 75% of estimated needs,by next RD assessment       Nutrition Monitoring and Evaluation:      Food/Nutrient Intake Outcomes: Food and Nutrient Intake,Supplement Intake  Physical Signs/Symptoms Outcomes: Meal Time Behavior,Biochemical Data    Discharge Planning:     Too soon to determine    Electronically signed by Cherry Birmingham MS, RD, LD on 6/11/2022 at 12:07 PM.

## 2022-06-11 NOTE — PROGRESS NOTES
Attempted to complete MRI BRAIN WO CONT today. Patient unable to hold still during MRI due to AMS; she was moving her arms, mouth, and head too much, making the images non-diagnostic. RN is reaching out to determine if patient can be medicated and what the next step is.

## 2022-06-11 NOTE — PROGRESS NOTES
Pt is alert and oriented to person/place only. Will only occasionally answer questions. Pt in bed, resting. Bed in low position, wheel locked, call light within reach, instructed to call with any needs. Hourly rounds completed this shift. NAD noted. VSS. Pt has not c/o pain. IV is infusing, and dressing is clean, dry, and intact. Pt turned/repositioned throughout shift for comfort. Pt had BM x3 this shift. Report to be given to day shift nurse.

## 2022-06-12 LAB
ANION GAP SERPL CALC-SCNC: 11 MMOL/L (ref 7–16)
BASOPHILS # BLD: 0.1 K/UL (ref 0–0.2)
BASOPHILS NFR BLD: 1 % (ref 0–2)
BUN SERPL-MCNC: 2 MG/DL (ref 8–23)
CALCIUM SERPL-MCNC: 8.8 MG/DL (ref 8.3–10.4)
CHLORIDE SERPL-SCNC: 112 MMOL/L (ref 98–107)
CO2 SERPL-SCNC: 21 MMOL/L (ref 21–32)
CREAT SERPL-MCNC: 0.4 MG/DL (ref 0.6–1)
DIFFERENTIAL METHOD BLD: ABNORMAL
EOSINOPHIL # BLD: 0.3 K/UL (ref 0–0.8)
EOSINOPHIL NFR BLD: 6 % (ref 0.5–7.8)
ERYTHROCYTE [DISTWIDTH] IN BLOOD BY AUTOMATED COUNT: 14.3 % (ref 11.9–14.6)
GLUCOSE BLD STRIP.AUTO-MCNC: 123 MG/DL (ref 65–100)
GLUCOSE BLD STRIP.AUTO-MCNC: 218 MG/DL (ref 65–100)
GLUCOSE BLD STRIP.AUTO-MCNC: 93 MG/DL (ref 65–100)
GLUCOSE BLD STRIP.AUTO-MCNC: 95 MG/DL (ref 65–100)
GLUCOSE SERPL-MCNC: 92 MG/DL (ref 65–100)
HCT VFR BLD AUTO: 36.4 % (ref 35.8–46.3)
HGB BLD-MCNC: 11.4 G/DL (ref 11.7–15.4)
IMM GRANULOCYTES # BLD AUTO: 0.2 K/UL (ref 0–0.5)
IMM GRANULOCYTES NFR BLD AUTO: 3 % (ref 0–5)
LYMPHOCYTES # BLD: 1.4 K/UL (ref 0.5–4.6)
LYMPHOCYTES NFR BLD: 24 % (ref 13–44)
MAGNESIUM SERPL-MCNC: 2 MG/DL (ref 1.8–2.4)
MCH RBC QN AUTO: 27.2 PG (ref 26.1–32.9)
MCHC RBC AUTO-ENTMCNC: 31.3 G/DL (ref 31.4–35)
MCV RBC AUTO: 86.9 FL (ref 79.6–97.8)
MONOCYTES # BLD: 0.7 K/UL (ref 0.1–1.3)
MONOCYTES NFR BLD: 11 % (ref 4–12)
NEUTS SEG # BLD: 3.4 K/UL (ref 1.7–8.2)
NEUTS SEG NFR BLD: 55 % (ref 43–78)
NRBC # BLD: 0 K/UL (ref 0–0.2)
PLATELET # BLD AUTO: 194 K/UL (ref 150–450)
PMV BLD AUTO: 8.9 FL (ref 9.4–12.3)
POTASSIUM SERPL-SCNC: 3 MMOL/L (ref 3.5–5.1)
RBC # BLD AUTO: 4.19 M/UL (ref 4.05–5.2)
SERVICE CMNT-IMP: ABNORMAL
SERVICE CMNT-IMP: ABNORMAL
SERVICE CMNT-IMP: NORMAL
SERVICE CMNT-IMP: NORMAL
SODIUM SERPL-SCNC: 144 MMOL/L (ref 136–145)
WBC # BLD AUTO: 6.1 K/UL (ref 4.3–11.1)

## 2022-06-12 PROCEDURE — 2580000003 HC RX 258: Performed by: EMERGENCY MEDICINE

## 2022-06-12 PROCEDURE — 82962 GLUCOSE BLOOD TEST: CPT

## 2022-06-12 PROCEDURE — 2580000003 HC RX 258: Performed by: FAMILY MEDICINE

## 2022-06-12 PROCEDURE — 80048 BASIC METABOLIC PNL TOTAL CA: CPT

## 2022-06-12 PROCEDURE — 6360000002 HC RX W HCPCS: Performed by: FAMILY MEDICINE

## 2022-06-12 PROCEDURE — 99232 SBSQ HOSP IP/OBS MODERATE 35: CPT | Performed by: PSYCHIATRY & NEUROLOGY

## 2022-06-12 PROCEDURE — 6370000000 HC RX 637 (ALT 250 FOR IP): Performed by: INTERNAL MEDICINE

## 2022-06-12 PROCEDURE — 85025 COMPLETE CBC W/AUTO DIFF WBC: CPT

## 2022-06-12 PROCEDURE — 83735 ASSAY OF MAGNESIUM: CPT

## 2022-06-12 PROCEDURE — 6370000000 HC RX 637 (ALT 250 FOR IP): Performed by: FAMILY MEDICINE

## 2022-06-12 PROCEDURE — 36415 COLL VENOUS BLD VENIPUNCTURE: CPT

## 2022-06-12 PROCEDURE — 1100000000 HC RM PRIVATE

## 2022-06-12 RX ADMIN — CARBOXYMETHYLCELLULOSE SODIUM 1 DROP: 10 GEL OPHTHALMIC at 21:35

## 2022-06-12 RX ADMIN — DULOXETINE HYDROCHLORIDE 120 MG: 30 CAPSULE, DELAYED RELEASE ORAL at 09:43

## 2022-06-12 RX ADMIN — POTASSIUM CHLORIDE 40 MEQ: 20 TABLET, EXTENDED RELEASE ORAL at 18:14

## 2022-06-12 RX ADMIN — DIVALPROEX SODIUM 250 MG: 125 CAPSULE ORAL at 21:34

## 2022-06-12 RX ADMIN — NYSTATIN 500000 UNITS: 100000 SUSPENSION ORAL at 18:14

## 2022-06-12 RX ADMIN — DIVALPROEX SODIUM 250 MG: 125 CAPSULE ORAL at 14:50

## 2022-06-12 RX ADMIN — SODIUM CHLORIDE, PRESERVATIVE FREE 10 ML: 5 INJECTION INTRAVENOUS at 10:26

## 2022-06-12 RX ADMIN — HEPARIN SODIUM 5000 UNITS: 5000 INJECTION INTRAVENOUS; SUBCUTANEOUS at 21:35

## 2022-06-12 RX ADMIN — ASPIRIN 325 MG ORAL TABLET 325 MG: 325 PILL ORAL at 09:43

## 2022-06-12 RX ADMIN — TRAZODONE HYDROCHLORIDE 25 MG: 50 TABLET ORAL at 21:35

## 2022-06-12 RX ADMIN — CARBIDOPA AND LEVODOPA 2 TABLET: 25; 100 TABLET ORAL at 09:43

## 2022-06-12 RX ADMIN — ARIPIPRAZOLE 15 MG: 5 TABLET ORAL at 09:43

## 2022-06-12 RX ADMIN — SODIUM CHLORIDE, PRESERVATIVE FREE 10 ML: 5 INJECTION INTRAVENOUS at 21:35

## 2022-06-12 RX ADMIN — HEPARIN SODIUM 5000 UNITS: 5000 INJECTION INTRAVENOUS; SUBCUTANEOUS at 14:49

## 2022-06-12 RX ADMIN — POTASSIUM CHLORIDE 40 MEQ: 20 TABLET, EXTENDED RELEASE ORAL at 13:28

## 2022-06-12 RX ADMIN — TOPIRAMATE 50 MG: 50 TABLET ORAL at 09:43

## 2022-06-12 RX ADMIN — CARBIDOPA AND LEVODOPA 2 TABLET: 25; 100 TABLET ORAL at 21:34

## 2022-06-12 RX ADMIN — NYSTATIN 500000 UNITS: 100000 SUSPENSION ORAL at 13:32

## 2022-06-12 RX ADMIN — CARBIDOPA AND LEVODOPA 2 TABLET: 25; 100 TABLET ORAL at 14:49

## 2022-06-12 RX ADMIN — SODIUM CHLORIDE, PRESERVATIVE FREE 3 ML: 5 INJECTION INTRAVENOUS at 02:42

## 2022-06-12 RX ADMIN — LEVOTHYROXINE SODIUM 25 MCG: 0.05 TABLET ORAL at 05:07

## 2022-06-12 RX ADMIN — PANTOPRAZOLE SODIUM 40 MG: 40 TABLET, DELAYED RELEASE ORAL at 05:08

## 2022-06-12 RX ADMIN — DIVALPROEX SODIUM 250 MG: 125 CAPSULE ORAL at 05:07

## 2022-06-12 RX ADMIN — NYSTATIN 500000 UNITS: 100000 SUSPENSION ORAL at 09:43

## 2022-06-12 RX ADMIN — POTASSIUM CHLORIDE 40 MEQ: 20 TABLET, EXTENDED RELEASE ORAL at 09:43

## 2022-06-12 RX ADMIN — SODIUM CHLORIDE, PRESERVATIVE FREE 3 ML: 5 INJECTION INTRAVENOUS at 18:17

## 2022-06-12 RX ADMIN — CARBOXYMETHYLCELLULOSE SODIUM 1 DROP: 10 GEL OPHTHALMIC at 09:43

## 2022-06-12 RX ADMIN — ROSUVASTATIN CALCIUM 20 MG: 10 TABLET, FILM COATED ORAL at 21:35

## 2022-06-12 RX ADMIN — NYSTATIN 500000 UNITS: 100000 SUSPENSION ORAL at 21:34

## 2022-06-12 RX ADMIN — HEPARIN SODIUM 5000 UNITS: 5000 INJECTION INTRAVENOUS; SUBCUTANEOUS at 05:08

## 2022-06-12 ASSESSMENT — PAIN SCALES - GENERAL
PAINLEVEL_OUTOF10: 0
PAINLEVEL_OUTOF10: 2

## 2022-06-12 ASSESSMENT — PAIN SCALES - WONG BAKER: WONGBAKER_NUMERICALRESPONSE: 0

## 2022-06-12 NOTE — PROGRESS NOTES
Hospitalist Progress Note   Admit Date:  6/3/2022 10:16 AM   Name:  Yesys Almonte   Age:  61 y.o. Sex:  female  :  1955   MRN:  [id number]   Room:  [facility]/    Presenting Complaint: Altered Mental Status     Reason(s) for Admission: Delirium [R41.0]  Acute febrile illness [K33.6]  Acute metabolic encephalopathy [M76.42]     Hospital Course & Interval History:     Patient with past medical history of  Previous CVA with residual left-sided weakness  Bed-bound  Patient has no teeth but able to eat regular food. CAD  Type 2 diabetes mellitus   history of pulmonary embolism, not on anticoagulation now. Anxiety  Hypothyroidism  Chronic pain  Parkinson disease    Patient was brought to hospital from nursing facility due to change in mental status. Normally patient could speak without issues. No detailed history is available. Patient was found to have elevated blood pressure, with heart rate of 111/min. Potassium was low, procalcitonin was low. Viral panel was negative. UA is positive for nitrites and 4+ bacteria, no white blood cells however. CT head and chest x-ray showed no significant acute findings. Patient was admitted due to change in mental status and for treatment of UTI. Subjective/24hr Events (22):    22   Patient is lying in bed and opens eyes when she is called and stimulated. She does not answer questions. No fever. No shaking. No chills. No report of shortness of breath. No report of chest pain. No diarrhea. No blood per rectum. No blood per urine. 22   Patient is mostly lying in bed and not talking. She does not respond to verbal stimuli. No fever in the last 24 hours. No chest pain. No shortness of breath. Patient is assessed by speech therapist and is allowed to have minced and moist diet now. 22   Patient is lying in bed and does not respond much verbally to stimuli. No fever. No shaking. No chills.    No shortness of breath. 6/7/22   Patient seems to open eyes more easily when she is called. No fever. No shaking. No chills. No chest pain. No shortness of breath. 6/8/22   Patient is resting in bed well. Not much interaction with me when she is called. No fever. No shaking. No chills. No chest pain. No shortness of breath. 6/9/22   Patient is lying comfortably in bed. She opens eyes with loud verbal stimuli. No fever. No shortness of breath. No chest pain. 6/10/22   Patient is resting in bed and does not talk back when asked. No fever. No shaking. No chills. No shortness of breath. No chest pain. 6/11/22   Patient was assessed by neurologist.   London Jenkins is that it is not unexpected for patient of her age and with underlying dementia, to have had acute metabolic encephalopathy from UTI and it could take some time before she recovers from it. No fever. No shaking. No chills. No shortness of breath. 6/12/22   Patient could not stay still for MRI yesterday. MRI was not done. Patient opened eyes to being called loudly. She seems to trying to answer. 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 No fever. No shaking. No chills. No shortness of breath. Assessment & Plan:     Principal Problem:    Acute metabolic encephalopathy    UTI (urinary tract infection)  Patient has completed empiric ceftriaxone. Blood culture is negative. Urine culture grew E.coli which is pan-sensitive. Speech therapist deems patient suitable for pureed diet. Mentation does not improve much although she seems more responsive to stimuli. Still not engaging in conversation. I am informed that patient was \"talking\" before coming to hospital.   Additional information is that patient is deaf and can read lips. CT brain on Meagan 3, 2022 did not show acute findings. Continue close monitoring.    Neurologist has given impression that it may take time for patient to recover from (L/min): 2 L/min  O2 Delivery Method: Nasal cannula    Estimated body mass index is 27.81 kg/m² as calculated from the following:    Height as of this encounter: 5' 4\" (1.626 m). Weight as of 6/11/22: 162 lb (73.5 kg). Intake/Output Summary (Last 24 hours) at 6/12/2022 0922  Last data filed at 6/12/2022 0350  Gross per 24 hour   Intake --   Output 1575 ml   Net -1575 ml         Physical Exam:     Blood pressure (!) 147/102, pulse (!) 106, temperature 97.9 °F (36.6 °C), temperature source Axillary, resp. rate 18, height 5' 4\" (1.626 m), weight 162 lb 4.1 oz (73.6 kg), SpO2 100 %. General:    Well nourished. Patient is lying flat quietly in bed. She opens eyes when she is called loudly. She does not answer questions. She does not answer her name when she was asked about it. She keeps eye contact sometimes however. 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 Head:  Normocephalic, atraumatic, not pale. No icterus. Eyes:  Sclerae appear normal.  Pupils equally round. ENT:  Nares appear normal, no drainage. Moist oral mucosa  Neck:  No restricted ROM. Trachea midline   CV:   RRR. No m/r/g. No jugular venous distension. Lungs:   CTAB. No wheezing, rhonchi, or rales. Respirations even, unlabored  Abdomen: Bowel sounds present. Soft, nontender, nondistended. Extremities: No cyanosis or clubbing. No edema  Skin:     No rashes and normal coloration. Warm and dry. Neuro:  As mentioned above, patient responded minimally to verbal stimuli. Does not seem to have localized weakness  No spasticity of her extremities.        I have reviewed ordered lab tests and independently visualized imaging below:    Recent Labs:  Recent Results (from the past 48 hour(s))   POCT Glucose    Collection Time: 06/10/22 11:55 AM   Result Value Ref Range    POC Glucose 112 (H) 65 - 100 mg/dL    Performed by: Ilene    POCT Glucose    Collection Time: 06/10/22  4:18 PM   Result Value Ref Range    POC Glucose 114 (H) 65 - 100 mg/dL    Performed by: JuleePCSUNNY    POCT Glucose    Collection Time: 06/10/22  8:49 PM   Result Value Ref Range    POC Glucose 99 65 - 100 mg/dL    Performed by: David    POCT Glucose    Collection Time: 06/11/22  7:48 AM   Result Value Ref Range    POC Glucose 90 65 - 100 mg/dL    Performed by: Danny Jeter    Basic Metabolic Panel    Collection Time: 06/11/22  8:47 AM   Result Value Ref Range    Sodium 143 136 - 145 mmol/L    Potassium 2.8 (L) 3.5 - 5.1 mmol/L    Chloride 112 (H) 98 - 107 mmol/L    CO2 21 21 - 32 mmol/L    Anion Gap 10 7 - 16 mmol/L    Glucose 95 65 - 100 mg/dL    BUN 2 (L) 8 - 23 MG/DL    CREATININE 0.30 (L) 0.6 - 1.0 MG/DL    GFR African American >60 >60 ml/min/1.73m2    GFR Non- >60 >60 ml/min/1.73m2    Calcium 8.2 (L) 8.3 - 10.4 MG/DL   POCT Glucose    Collection Time: 06/11/22 11:51 AM   Result Value Ref Range    POC Glucose 115 (H) 65 - 100 mg/dL    Performed by: JuleePCSUNNY    POCT Glucose    Collection Time: 06/11/22  4:16 PM   Result Value Ref Range    POC Glucose 123 (H) 65 - 100 mg/dL    Performed by: JuleePCSUNNY    POCT Glucose    Collection Time: 06/11/22  8:21 PM   Result Value Ref Range    POC Glucose 86 65 - 100 mg/dL    Performed by: Sheri    POCT Glucose    Collection Time: 06/12/22  7:19 AM   Result Value Ref Range    POC Glucose 93 65 - 100 mg/dL    Performed by: Dinah    Basic Metabolic Panel    Collection Time: 06/12/22  7:49 AM   Result Value Ref Range    Sodium 144 136 - 145 mmol/L    Potassium 3.0 (L) 3.5 - 5.1 mmol/L    Chloride 112 (H) 98 - 107 mmol/L    CO2 21 21 - 32 mmol/L    Anion Gap 11 7 - 16 mmol/L    Glucose 92 65 - 100 mg/dL    BUN 2 (L) 8 - 23 MG/DL    CREATININE 0.40 (L) 0.6 - 1.0 MG/DL    GFR African American >60 >60 ml/min/1.73m2    GFR Non- >60 >60 ml/min/1.73m2    Calcium 8.8 8.3 - 10.4 MG/DL   Magnesium    Collection Time: 06/12/22  7:49 AM Result Value Ref Range    Magnesium 2.0 1.8 - 2.4 mg/dL   CBC with Auto Differential    Collection Time: 06/12/22  7:49 AM   Result Value Ref Range    WBC 6.1 4.3 - 11.1 K/uL    RBC 4.19 4.05 - 5.2 M/uL    Hemoglobin 11.4 (L) 11.7 - 15.4 g/dL    Hematocrit 36.4 35.8 - 46.3 %    MCV 86.9 79.6 - 97.8 FL    MCH 27.2 26.1 - 32.9 PG    MCHC 31.3 (L) 31.4 - 35.0 g/dL    RDW 14.3 11.9 - 14.6 %    Platelets 408 247 - 519 K/uL    MPV 8.9 (L) 9.4 - 12.3 FL    nRBC 0.00 0.0 - 0.2 K/uL    Differential Type AUTOMATED      Seg Neutrophils 55 43 - 78 %    Lymphocytes 24 13 - 44 %    Monocytes 11 4.0 - 12.0 %    Eosinophils % 6 0.5 - 7.8 %    Basophils 1 0.0 - 2.0 %    Immature Granulocytes 3 0.0 - 5.0 %    Segs Absolute 3.4 1.7 - 8.2 K/UL    Absolute Lymph # 1.4 0.5 - 4.6 K/UL    Absolute Mono # 0.7 0.1 - 1.3 K/UL    Absolute Eos # 0.3 0.0 - 0.8 K/UL    Basophils Absolute 0.1 0.0 - 0.2 K/UL    Absolute Immature Granulocyte 0.2 0.0 - 0.5 K/UL       Other Studies:  CT HEAD WO CONTRAST    Result Date: 6/3/2022  CT of the head without contrast. CLINICAL INDICATION: Lethargy, altered mental status PROCEDURE: Serial thin section axial images are obtained from the cranial vertex through the skull base without the administration of intravenous contrast. Radiation dose reduction techniques were used for this study. Our CT scanners use one or all of the following: Automated exposure control, adjusted of the mA and/or kV according to patient size, iterative reconstruction COMPARISON: Head CT dated 2/15/2013. FINDINGS: There is no acute intracranial hemorrhage, mass, or mass effect. No abnormal extra-axial fluid collections identified. There is no hydrocephalus. The basilar cisterns are widely patent. Extensive bilateral patchy white matter hypoattenuation again noted. It is unchanged. No findings present to indicate large, cortical-based territorial infarction. There is a small old right basal ganglia lacunar infarct.  No skull fracture or aggressive osseous lesion noted. Thick mucoperiosteal thickening is noted in the right maxillary sinus. The mastoid air cells are clear. 1. No acute intracranial abnormality. 2. Extensive bilateral white matter hypoattenuation most in keeping with chronic microangiopathic disease. 3. Chronic appearing mucoperiosteal thickening in the right maxillary sinus. XR CHEST PORTABLE    Result Date: 6/3/2022  Portable chest x-ray CLINICAL INDICATION: Altered mental status. FINDINGS: Single AP view the chest compared to a similar exam dated 8/17/2006 shows mild subsegmental atelectasis in the right midlung. Otherwise the lung parenchyma is clear. There is no pleural effusion or pneumothorax. The cardiac silhouette and mediastinum are unremarkable. Mild subsegmental atelectasis in the right midlung. Otherwise no acute abnormality.       Current Meds:  Current Facility-Administered Medications   Medication Dose Route Frequency    insulin lispro (HUMALOG) injection vial 0-4 Units  0-4 Units SubCUTAneous TID WC    insulin lispro (HUMALOG) injection vial 0-4 Units  0-4 Units SubCUTAneous Nightly    pantoprazole (PROTONIX) tablet 40 mg  40 mg Oral QAM AC    potassium chloride (KLOR-CON M) extended release tablet 40 mEq  40 mEq Oral TID WC    nystatin (MYCOSTATIN) 319490 UNIT/ML suspension 500,000 Units  5 mL Oral 4x Daily    aspirin tablet 325 mg  325 mg Oral Daily    rosuvastatin (CRESTOR) tablet 20 mg  20 mg Oral Nightly    0.9 % sodium chloride infusion   IntraVENous Continuous    glucose chewable tablet 16 g  4 tablet Oral PRN    dextrose bolus 10% 125 mL  125 mL IntraVENous PRN    Or    dextrose bolus 10% 250 mL  250 mL IntraVENous PRN    glucagon injection 1 mg  1 mg IntraMUSCular PRN    dextrose 5 % solution  100 mL/hr IntraVENous PRN    metoprolol (LOPRESSOR) injection 5 mg  5 mg IntraVENous Q6H PRN    sodium chloride flush 0.9 % injection 3 mL  3 mL IntraVENous Q8H    DULoxetine (CYMBALTA) extended release capsule 120 mg  120 mg Oral Daily    levothyroxine (SYNTHROID) tablet 25 mcg  25 mcg Oral Daily    carbidopa-levodopa (SINEMET)  MG per tablet 2 tablet  2 tablet Oral TID    carboxymethylcellulose (THERATEARS) 1 % ophthalmic gel 1 drop  1 drop Both Eyes Q12H    ARIPiprazole (ABILIFY) tablet 15 mg  15 mg Oral Daily    topiramate (TOPAMAX) tablet 50 mg  50 mg Oral Daily    divalproex (DEPAKOTE SPRINKLE) capsule 250 mg  250 mg Oral 3 times per day    traZODone (DESYREL) tablet 25 mg  25 mg Oral Nightly    sodium chloride flush 0.9 % injection 5-40 mL  5-40 mL IntraVENous PRN    sodium chloride flush 0.9 % injection 5-40 mL  5-40 mL IntraVENous 2 times per day    sodium chloride flush 0.9 % injection 5-40 mL  5-40 mL IntraVENous PRN    0.9 % sodium chloride infusion   IntraVENous PRN    polyethylene glycol (GLYCOLAX) packet 17 g  17 g Oral Daily PRN    acetaminophen (TYLENOL) tablet 650 mg  650 mg Oral Q6H PRN    Or    acetaminophen (TYLENOL) suppository 650 mg  650 mg Rectal Q6H PRN    heparin (porcine) injection 5,000 Units  5,000 Units SubCUTAneous 3 times per day    hydrALAZINE (APRESOLINE) injection 20 mg  20 mg IntraVENous Q6H PRN    butalbital-acetaminophen-caffeine (FIORICET, ESGIC) per tablet 1 tablet  1 tablet Oral Q4H PRN       Signed:  Bob Spring MD    Part of this note may have been written by using a voice dictation software. The note has been proof read but may still contain some grammatical/other typographical errors.

## 2022-06-12 NOTE — PROGRESS NOTES
Neurology Daily Progress Note     Assessment:     62-year-old woman with a history of parkinsonism who has worsening cognition in the setting of urinary tract infection and low cognitive reserve. The patient likely has underlying dementia. Patients with dementia have worsening cognition when hospitalized, especially in the setting of a urinary tract infection, and typically recovery is over a period of weeks. We will obtain a brain MRI to rule out acute pathology, but her clinical course is not unexpected. Plan:     MRI of the brain, when able. She did not tolerate yesterday. It is not totally necessary. The patient is deaf. She can read lips and does respond. Will follow peripherally     Management of Delirium     Non-pharmacological intervention  · Reorient the patient throughout the day  · Window open and lights on during the day. Lights off, television off, noises down during the night. If able, decrease nursing checks at night  · Therapies as often as possible  · Avoid restraints to the best of your ability   · Avoid sensory deprivation by using glasses and hearing aids, if applicable       Pharmacological intervention  · Replete electrolyte abnormalities and correct metabolic abnormalities  · Limit benzodiazepines, antihistamines, narcotics, anticholinergics. Preference towards Precedex for sedation over fentanyl and benzodiazepines/GABAa agonists. · For dangerous behavior/aggression to self or others can consider Zyprexa or Seroquel if benefits outweigh risk  · For persistent insomnia can use melatonin four hours prior to bedtime or Seroquel 25 mg at bedtime       Subjective: Interval history:    The patient was asleep when I entered the room. She was difficult to arouse but then made eye contact.   The patient is deaf so it is difficult for her to follow commands but she seemed alert    History:    Corinne Reyes is a 61 y.o. female who is being seen for decreased level of responsiveness. Review of systems negative with exception of pertinent positives and negatives noted above.        Objective:     Vitals:    06/11/22 1925 06/11/22 2322 06/12/22 0342 06/12/22 0716   BP: 133/76 130/88 (!) 149/82 (!) 147/102   Pulse: (!) 101 (!) 103 (!) 106 (!) 106   Resp: 18 16 18 18   Temp: 97.9 °F (36.6 °C) 98.4 °F (36.9 °C) 99 °F (37.2 °C) 97.9 °F (36.6 °C)   TempSrc: Oral Oral Oral Axillary   SpO2: 98% 99% 96% 100%   Weight:       Height:              Current Facility-Administered Medications:     insulin lispro (HUMALOG) injection vial 0-4 Units, 0-4 Units, SubCUTAneous, TID WC, Ilia Stanley MD    insulin lispro (HUMALOG) injection vial 0-4 Units, 0-4 Units, SubCUTAneous, Nightly, Ilia Stanley MD    pantoprazole (PROTONIX) tablet 40 mg, 40 mg, Oral, QAM AC, Ilia Stanley MD, 40 mg at 06/12/22 0508    potassium chloride (KLOR-CON M) extended release tablet 40 mEq, 40 mEq, Oral, TID WC, Ilia Stanley MD, 40 mEq at 06/12/22 0943    nystatin (MYCOSTATIN) [id number] UNIT/ML suspension 500,000 Units, 5 mL, Oral, 4x Daily, Ilia Stanley MD, 500,000 Units at 06/12/22 0943    aspirin tablet 325 mg, 325 mg, Oral, Daily, Ilia Stanley MD, 325 mg at 06/12/22 0943    rosuvastatin (CRESTOR) tablet 20 mg, 20 mg, Oral, Nightly, Ilia Stanley MD, 20 mg at 06/11/22 2105    0.9 % sodium chloride infusion, , IntraVENous, Continuous, Ilia Stanley MD, Last Rate: 75 mL/hr at 06/11/22 2134, New Bag at 06/11/22 2134    glucose chewable tablet 16 g, 4 tablet, Oral, PRN, Ilia Stanley MD    dextrose bolus 10% 125 mL, 125 mL, IntraVENous, PRN **OR** dextrose bolus 10% 250 mL, 250 mL, IntraVENous, PRN, Ilia Stanley MD    glucagon injection 1 mg, 1 mg, IntraMUSCular, PRN, Ilia Stanley MD    dextrose 5 % solution, 100 mL/hr, IntraVENous, PRN, Ilia Stanley MD    metoprolol (LOPRESSOR) injection 5 mg, 5 mg, IntraVENous, Q6H PRN, Lila NEWTON Bridget Horan MD, 5 mg at 06/05/22 0631    Saline lock IV, , , Continuous **AND** sodium chloride flush 0.9 % injection 3 mL, 3 mL, IntraVENous, Q8H, Stu Christina MD, 3 mL at 06/12/22 0242    DULoxetine (CYMBALTA) extended release capsule 120 mg, 120 mg, Oral, Daily, Nidia Skains, DO, 120 mg at 06/12/22 8932    levothyroxine (SYNTHROID) tablet 25 mcg, 25 mcg, Oral, Daily, Nidia Skains, DO, 25 mcg at 06/12/22 0507    carbidopa-levodopa (SINEMET)  MG per tablet 2 tablet, 2 tablet, Oral, TID, Nidia Skains, DO, 2 tablet at 06/12/22 0943    carboxymethylcellulose (THERATEARS) 1 % ophthalmic gel 1 drop, 1 drop, Both Eyes, Q12H, Nidia Skains, DO, 1 drop at 06/11/22 2106    ARIPiprazole (ABILIFY) tablet 15 mg, 15 mg, Oral, Daily, Nidia Skains, DO, 15 mg at 06/12/22 0943    topiramate (TOPAMAX) tablet 50 mg, 50 mg, Oral, Daily, Nidia Skains, DO, 50 mg at 06/12/22 0943    divalproex (DEPAKOTE SPRINKLE) capsule 250 mg, 250 mg, Oral, 3 times per day, Nidia Skains, DO, 250 mg at 06/12/22 0507    traZODone (DESYREL) tablet 25 mg, 25 mg, Oral, Nightly, Nidia Skains, DO, 25 mg at 06/11/22 2105    sodium chloride flush 0.9 % injection 5-40 mL, 5-40 mL, IntraVENous, PRN, Stu Christina MD    sodium chloride flush 0.9 % injection 5-40 mL, 5-40 mL, IntraVENous, 2 times per day, Nidia Skains, DO, 10 mL at 06/11/22 2025    sodium chloride flush 0.9 % injection 5-40 mL, 5-40 mL, IntraVENous, PRN, Nidia Skains, DO    0.9 % sodium chloride infusion, , IntraVENous, PRN, Nidia Koch, DO    polyethylene glycol (GLYCOLAX) packet 17 g, 17 g, Oral, Daily PRN, Nidia Koch,     acetaminophen (TYLENOL) tablet 650 mg, 650 mg, Oral, Q6H PRN **OR** acetaminophen (TYLENOL) suppository 650 mg, 650 mg, Rectal, Q6H PRN, Nidia Koch,     heparin (porcine) injection 5,000 Units, 5,000 Units, SubCUTAneous, 3 times per day, Nidia Koch DO, 5,000 Units at 06/12/22 1092   hydrALAZINE (APRESOLINE) injection 20 mg, 20 mg, IntraVENous, Q6H PRN, Jaja Barajas DO, 20 mg at 06/05/22 0302    butalbital-acetaminophen-caffeine (FIORICET, ESGIC) per tablet 1 tablet, 1 tablet, Oral, Q4H PRN, Jaja Barajas DO    Recent Results (from the past 12 hour(s))   POCT Glucose    Collection Time: 06/12/22  7:19 AM   Result Value Ref Range    POC Glucose 93 65 - 100 mg/dL    Performed by: [employer]    Basic Metabolic Panel    Collection Time: 06/12/22  7:49 AM   Result Value Ref Range    Sodium 144 136 - 145 mmol/L    Potassium 3.0 (L) 3.5 - 5.1 mmol/L    Chloride 112 (H) 98 - 107 mmol/L    CO2 21 21 - 32 mmol/L    Anion Gap 11 7 - 16 mmol/L    Glucose 92 65 - 100 mg/dL    BUN 2 (L) 8 - 23 MG/DL    CREATININE 0.40 (L) 0.6 - 1.0 MG/DL    GFR African American >60 >60 ml/min/1.73m2    GFR Non- >60 >60 ml/min/1.73m2    Calcium 8.8 8.3 - 10.4 MG/DL   Magnesium    Collection Time: 06/12/22  7:49 AM   Result Value Ref Range    Magnesium 2.0 1.8 - 2.4 mg/dL   CBC with Auto Differential    Collection Time: 06/12/22  7:49 AM   Result Value Ref Range    WBC 6.1 4.3 - 11.1 K/uL    RBC 4.19 4.05 - 5.2 M/uL    Hemoglobin 11.4 (L) 11.7 - 15.4 g/dL    Hematocrit 36.4 35.8 - 46.3 %    MCV 86.9 79.6 - 97.8 FL    MCH 27.2 26.1 - 32.9 PG    MCHC 31.3 (L) 31.4 - 35.0 g/dL    RDW 14.3 11.9 - 14.6 %    Platelets 454 091 - 824 K/uL    MPV 8.9 (L) 9.4 - 12.3 FL    nRBC 0.00 0.0 - 0.2 K/uL    Differential Type AUTOMATED      Seg Neutrophils 55 43 - 78 %    Lymphocytes 24 13 - 44 %    Monocytes 11 4.0 - 12.0 %    Eosinophils % 6 0.5 - 7.8 %    Basophils 1 0.0 - 2.0 %    Immature Granulocytes 3 0.0 - 5.0 %    Segs Absolute 3.4 1.7 - 8.2 K/UL    Absolute Lymph # 1.4 0.5 - 4.6 K/UL    Absolute Mono # 0.7 0.1 - 1.3 K/UL    Absolute Eos # 0.3 0.0 - 0.8 K/UL    Basophils Absolute 0.1 0.0 - 0.2 K/UL    Absolute Immature Granulocyte 0.2 0.0 - 0.5 K/UL         Physical Exam:  General -: Chronically disabled. Appears older than stated age  [de-identified] - Normocephalic, atraumatic. Conjunctiva, tympanic membranes, and oropharynx are clear. Neck - Supple without masses, no bruits   Cardiovascular - Regular rate and rhythm. Normal S1, S2 without murmurs, rubs, or gallops. Lungs - Clear to auscultation. Abdomen - Soft, nontender with normal bowel sounds. Extremities - Peripheral pulses intact. No edema and no rashes.      Neurological examination -     Comprehension is intact to one-step commands. She struggles to follow more complex commands. Attention is fair. Language and speech are limited. She does say short phrases. . On cranial nerve examination pupils are equal round and reactive to light. Visual fields are full to finger confrontation. Extraocular motility is normal. Face is symmetric and sensation is intact to light touch. Hearing is intact to finger rustle bilaterally. Motor examination - There is normal muscle tone and bulk with exception to increased muscle tone in the left upper limb which is mild. In the bilateral upper extremities she has 4+/5 strength in the right upper limb and 4 -/5 strength in the left upper limb. Some spontaneous movements in the bilateral lower limbs, perhaps 2/5 strength. Muscle stretch reflexes are absent throughout.   She could not participate in sensory examination, cerebellar examination, gait or stance due to her medical condition.           Signed By: Shantal Christopher DO     June 12, 2022

## 2022-06-12 NOTE — PROGRESS NOTES
Attempted  to visit with the patient   she is currently resting peacefully   No family is in the room

## 2022-06-12 NOTE — PLAN OF CARE
Problem: Discharge Planning  Goal: Discharge to home or other facility with appropriate resources  Outcome: Progressing  Flowsheets (Taken 6/11/2022 1948)  Discharge to home or other facility with appropriate resources: Identify barriers to discharge with patient and caregiver     Problem: Pain  Goal: Verbalizes/displays adequate comfort level or baseline comfort level  Outcome: Progressing     Problem: Skin/Tissue Integrity  Goal: Absence of new skin breakdown  Description: 1. Monitor for areas of redness and/or skin breakdown  2. Assess vascular access sites hourly  3. Every 4-6 hours minimum:  Change oxygen saturation probe site  4. Every 4-6 hours:  If on nasal continuous positive airway pressure, respiratory therapy assess nares and determine need for appliance change or resting period. Outcome: Progressing     Problem: Safety - Adult  Goal: Free from fall injury  Outcome: Progressing     Problem: ABCDS Injury Assessment  Goal: Absence of physical injury  Outcome: Progressing     Problem: Chronic Conditions and Co-morbidities  Goal: Patient's chronic conditions and co-morbidity symptoms are monitored and maintained or improved  Outcome: Progressing  Flowsheets (Taken 6/11/2022 1948)  Care Plan - Patient's Chronic Conditions and Co-Morbidity Symptoms are Monitored and Maintained or Improved: Monitor and assess patient's chronic conditions and comorbid symptoms for stability, deterioration, or improvement     Problem: Neurosensory - Adult  Goal: Achieves stable or improved neurological status  Outcome: Progressing  Flowsheets (Taken 6/11/2022 1948)  Achieves stable or improved neurological status: Assess for and report changes in neurological status  Goal: Absence of seizures  Outcome: Progressing  Flowsheets (Taken 6/11/2022 1948)  Absence of seizures: Monitor for seizure activity.   If seizure occurs, document type and location of movements and any associated apnea  Goal: Remains free of injury related to seizures activity  Outcome: Progressing  Flowsheets (Taken 6/11/2022 1948)  Remains free of injury related to seizure activity: Maintain airway, patient safety  and administer oxygen as ordered  Goal: Achieves maximal functionality and self care  Outcome: Progressing  Flowsheets (Taken 6/11/2022 1948)  Achieves maximal functionality and self care: Monitor swallowing and airway patency with patient fatigue and changes in neurological status     Problem: Respiratory - Adult  Goal: Achieves optimal ventilation and oxygenation  Outcome: Progressing     Problem: Cardiovascular - Adult  Goal: Maintains optimal cardiac output and hemodynamic stability  Outcome: Progressing  Flowsheets (Taken 6/11/2022 1948)  Maintains optimal cardiac output and hemodynamic stability: Monitor blood pressure and heart rate  Goal: Absence of cardiac dysrhythmias or at baseline  Outcome: Progressing     Problem: Skin/Tissue Integrity - Adult  Goal: Skin integrity remains intact  Outcome: Progressing  Flowsheets (Taken 6/11/2022 1948)  Skin Integrity Remains Intact: Monitor for areas of redness and/or skin breakdown  Goal: Incisions, wounds, or drain sites healing without S/S of infection  Outcome: Progressing  Goal: Oral mucous membranes remain intact  Outcome: Progressing  Flowsheets (Taken 6/11/2022 1948)  Oral Mucous Membranes Remain Intact: Assess oral mucosa and hygiene practices     Problem: Musculoskeletal - Adult  Goal: Return mobility to safest level of function  Outcome: Progressing  Flowsheets (Taken 6/11/2022 1948)  Return Mobility to Safest Level of Function: Assess patient stability and activity tolerance for standing, transferring and ambulating with or without assistive devices  Goal: Maintain proper alignment of affected body part  Outcome: Progressing  Flowsheets (Taken 6/11/2022 1948)  Maintain proper alignment of affected body part: Support and protect limb and body alignment per provider's orders  Goal: Return ADL status to a safe level of function  Outcome: Progressing  Flowsheets (Taken 6/11/2022 1948)  Return ADL Status to a Safe Level of Function: Administer medication as ordered     Problem: Gastrointestinal - Adult  Goal: Minimal or absence of nausea and vomiting  Outcome: Progressing  Flowsheets (Taken 6/11/2022 1948)  Minimal or absence of nausea and vomiting: Provide nonpharmacologic comfort measures as appropriate  Goal: Maintains or returns to baseline bowel function  Outcome: Progressing  Flowsheets (Taken 6/11/2022 1948)  Maintains or returns to baseline bowel function: Assess bowel function  Goal: Maintains adequate nutritional intake  Outcome: Progressing  Flowsheets (Taken 6/11/2022 1948)  Maintains adequate nutritional intake: Identify factors contributing to decreased intake, treat as appropriate     Problem: Genitourinary - Adult  Goal: Absence of urinary retention  Outcome: Progressing  Flowsheets (Taken 6/11/2022 1948)  Absence of urinary retention: Monitor intake/output and perform bladder scan as needed  Goal: Urinary catheter remains patent  Outcome: Progressing  Flowsheets (Taken 6/11/2022 1948)  Urinary catheter remains patent: Assess patency of urinary catheter     Problem: Infection - Adult  Goal: Absence of infection at discharge  Outcome: Progressing  Flowsheets (Taken 6/11/2022 1948)  Absence of infection at discharge:   Assess and monitor for signs and symptoms of infection   Monitor lab/diagnostic results  Goal: Absence of infection during hospitalization  Outcome: Progressing  Flowsheets (Taken 6/11/2022 1948)  Absence of infection during hospitalization:   Assess and monitor for signs and symptoms of infection   Monitor lab/diagnostic results  Goal: Absence of fever/infection during anticipated neutropenic period  Outcome: Progressing  Flowsheets (Taken 6/11/2022 1948)  Absence of fever/infection during anticipated neutropenic period: Monitor white blood cell count     Problem: Metabolic/Fluid and Electrolytes - Adult  Goal: Electrolytes maintained within normal limits  Outcome: Progressing  Flowsheets (Taken 6/11/2022 1948)  Electrolytes maintained within normal limits: Monitor labs and assess patient for signs and symptoms of electrolyte imbalances  Goal: Hemodynamic stability and optimal renal function maintained  Outcome: Progressing  Flowsheets (Taken 6/11/2022 1948)  Hemodynamic stability and optimal renal function maintained:   Monitor labs and assess for signs and symptoms of volume excess or deficit   Monitor intake, output and patient weight  Goal: Glucose maintained within prescribed range  Outcome: Progressing  Flowsheets (Taken 6/11/2022 1948)  Glucose maintained within prescribed range: Monitor blood glucose as ordered     Problem: Hematologic - Adult  Goal: Maintains hematologic stability  Outcome: Progressing  Flowsheets (Taken 6/11/2022 1948)  Maintains hematologic stability: Assess for signs and symptoms of bleeding or hemorrhage

## 2022-06-13 LAB
GLUCOSE BLD STRIP.AUTO-MCNC: 114 MG/DL (ref 65–100)
GLUCOSE BLD STRIP.AUTO-MCNC: 82 MG/DL (ref 65–100)
GLUCOSE BLD STRIP.AUTO-MCNC: 91 MG/DL (ref 65–100)
GLUCOSE BLD STRIP.AUTO-MCNC: 98 MG/DL (ref 65–100)
SERVICE CMNT-IMP: ABNORMAL
SERVICE CMNT-IMP: NORMAL

## 2022-06-13 PROCEDURE — 2580000003 HC RX 258: Performed by: FAMILY MEDICINE

## 2022-06-13 PROCEDURE — 6360000002 HC RX W HCPCS: Performed by: FAMILY MEDICINE

## 2022-06-13 PROCEDURE — 2580000003 HC RX 258: Performed by: INTERNAL MEDICINE

## 2022-06-13 PROCEDURE — 1100000000 HC RM PRIVATE

## 2022-06-13 PROCEDURE — 6370000000 HC RX 637 (ALT 250 FOR IP): Performed by: INTERNAL MEDICINE

## 2022-06-13 PROCEDURE — 6370000000 HC RX 637 (ALT 250 FOR IP): Performed by: FAMILY MEDICINE

## 2022-06-13 PROCEDURE — 92526 ORAL FUNCTION THERAPY: CPT

## 2022-06-13 PROCEDURE — 82962 GLUCOSE BLOOD TEST: CPT

## 2022-06-13 PROCEDURE — 2580000003 HC RX 258: Performed by: EMERGENCY MEDICINE

## 2022-06-13 RX ADMIN — PANTOPRAZOLE SODIUM 40 MG: 40 TABLET, DELAYED RELEASE ORAL at 05:15

## 2022-06-13 RX ADMIN — POTASSIUM CHLORIDE 40 MEQ: 20 TABLET, EXTENDED RELEASE ORAL at 18:12

## 2022-06-13 RX ADMIN — CARBOXYMETHYLCELLULOSE SODIUM 1 DROP: 10 GEL OPHTHALMIC at 08:56

## 2022-06-13 RX ADMIN — SODIUM CHLORIDE: 900 INJECTION, SOLUTION INTRAVENOUS at 21:28

## 2022-06-13 RX ADMIN — DULOXETINE HYDROCHLORIDE 120 MG: 30 CAPSULE, DELAYED RELEASE ORAL at 08:53

## 2022-06-13 RX ADMIN — NYSTATIN 500000 UNITS: 100000 SUSPENSION ORAL at 21:27

## 2022-06-13 RX ADMIN — DIVALPROEX SODIUM 250 MG: 125 CAPSULE ORAL at 21:26

## 2022-06-13 RX ADMIN — ROSUVASTATIN CALCIUM 20 MG: 10 TABLET, FILM COATED ORAL at 21:26

## 2022-06-13 RX ADMIN — POTASSIUM CHLORIDE 40 MEQ: 20 TABLET, EXTENDED RELEASE ORAL at 08:54

## 2022-06-13 RX ADMIN — SODIUM CHLORIDE, PRESERVATIVE FREE 3 ML: 5 INJECTION INTRAVENOUS at 18:13

## 2022-06-13 RX ADMIN — HEPARIN SODIUM 5000 UNITS: 5000 INJECTION INTRAVENOUS; SUBCUTANEOUS at 05:15

## 2022-06-13 RX ADMIN — HEPARIN SODIUM 5000 UNITS: 5000 INJECTION INTRAVENOUS; SUBCUTANEOUS at 14:00

## 2022-06-13 RX ADMIN — ASPIRIN 325 MG ORAL TABLET 325 MG: 325 PILL ORAL at 08:54

## 2022-06-13 RX ADMIN — TOPIRAMATE 50 MG: 50 TABLET ORAL at 08:54

## 2022-06-13 RX ADMIN — DIVALPROEX SODIUM 250 MG: 125 CAPSULE ORAL at 05:15

## 2022-06-13 RX ADMIN — SODIUM CHLORIDE, PRESERVATIVE FREE 3 ML: 5 INJECTION INTRAVENOUS at 10:30

## 2022-06-13 RX ADMIN — CARBIDOPA AND LEVODOPA 2 TABLET: 25; 100 TABLET ORAL at 21:26

## 2022-06-13 RX ADMIN — CARBIDOPA AND LEVODOPA 2 TABLET: 25; 100 TABLET ORAL at 08:54

## 2022-06-13 RX ADMIN — SODIUM CHLORIDE, PRESERVATIVE FREE 10 ML: 5 INJECTION INTRAVENOUS at 21:28

## 2022-06-13 RX ADMIN — DIVALPROEX SODIUM 250 MG: 125 CAPSULE ORAL at 13:12

## 2022-06-13 RX ADMIN — HEPARIN SODIUM 5000 UNITS: 5000 INJECTION INTRAVENOUS; SUBCUTANEOUS at 21:27

## 2022-06-13 RX ADMIN — ARIPIPRAZOLE 15 MG: 5 TABLET ORAL at 08:53

## 2022-06-13 RX ADMIN — CARBOXYMETHYLCELLULOSE SODIUM 1 DROP: 10 GEL OPHTHALMIC at 21:27

## 2022-06-13 RX ADMIN — LEVOTHYROXINE SODIUM 25 MCG: 0.05 TABLET ORAL at 05:15

## 2022-06-13 RX ADMIN — SODIUM CHLORIDE, PRESERVATIVE FREE 3 ML: 5 INJECTION INTRAVENOUS at 02:30

## 2022-06-13 RX ADMIN — NYSTATIN 500000 UNITS: 100000 SUSPENSION ORAL at 13:13

## 2022-06-13 RX ADMIN — CARBIDOPA AND LEVODOPA 2 TABLET: 25; 100 TABLET ORAL at 13:12

## 2022-06-13 RX ADMIN — NYSTATIN 500000 UNITS: 100000 SUSPENSION ORAL at 18:12

## 2022-06-13 RX ADMIN — TRAZODONE HYDROCHLORIDE 25 MG: 50 TABLET ORAL at 21:26

## 2022-06-13 RX ADMIN — POTASSIUM CHLORIDE 40 MEQ: 20 TABLET, EXTENDED RELEASE ORAL at 13:13

## 2022-06-13 RX ADMIN — NYSTATIN 500000 UNITS: 100000 SUSPENSION ORAL at 08:53

## 2022-06-13 ASSESSMENT — PAIN SCALES - WONG BAKER: WONGBAKER_NUMERICALRESPONSE: 0

## 2022-06-13 ASSESSMENT — PAIN SCALES - GENERAL
PAINLEVEL_OUTOF10: 0

## 2022-06-13 NOTE — PROGRESS NOTES
LTG: Patient will tolerate least restrictive oral diet without overt s/sx of airway compromise. STG: Patient will tolerate minced and moist with nectar thick liquids without overt s/sx of airway compromise. Goal Added   STG: Patient will tolerate single sips of thin liquid/water via straw for pleasure with 1:1 assistance after oral care. Added     STG: Patient will tolerate pureed diet and mildly thick liquids without overt s/sx aspiration. Added  Goal Met   STG: Patient will participate in modified barium swallow study for objective assessment of oropharyngeal swallow function. Added , MET   STG pt will participate in a full ST evaluation if indicated.     SPEECH LANGUAGE PATHOLOGY: DYSPHAGIA  Progress Report    NAME: Donna Watson  : 1955  MRN: [id number]    ADMISSION DATE: 6/3/2022  ADMITTING DIAGNOSIS: has Diabetes mellitus ([facility] Nausea; Coagulopathy ([facility] RYAN (obstructive sleep apnea); Syncope and collapse; Anemia, unspecified; Hypokalemia; Abdominal pain; Stroke [facility]); Diarrhea; Hematoma; Tachycardia; ARF (acute renal failure) (HCC); DJD (degenerative joint disease); HTN (hypertension);  Acute metabolic encephalopathy; and UTI (urinary tract infection) on their problem list.    ICD-10: Treatment Diagnosis: R13.12 Dysphagia, Oropharyngeal Phase    RECOMMENDATIONS   Diet:  Diet Solids Recommendation: Minced & Moist (Family can  bring minced foods for patient outside of the facility - family reports patient with limited intake and describes patient as a picky eater.)  Liquid Consistency Recommendation: Mildly Thick (Nectar)    Medications: Crushed in puree as able     Recommendations: Assistance with meals   Compensatory Swallowing Strategies:Small bites/sips   Therapeutic Intervention:    Patient continues to require skilled intervention: Yes   D/C Recommendations: Ongoing speech therapy is recommended at next level of care     ASSESSMENT    Dysphagia Diagnosis: Mild oral stage dysphagia;Mild pharyngeal stage dysphagia  Dysphagia Impression : Patient exhibits mild oral and pharyngeal phase dysphagia. Discussed diet with family who reports patient does continue to exhibit varying levels of alertness and thus agreeable for patient to continue mildly thick/nectar thick liquids. Patient can be advanced to NDD2/minced+moist foods which family is in agreement too. GENERAL    History of Present Injury/Illness: Ms. Anna Jordan  has a past medical history of Arthritis, CAD (coronary artery disease), Chronic kidney disease, Chronic pain, Coagulation defects, Diabetes ([address] GERD (gastroesophageal reflux disease), Hypertension, Morbid obesity ([address] PE (pulmonary thromboembolism) ([address] Psychiatric disorder, Stroke ([address] Thromboembolus ([address] Thyroid disease, and Unspecified sleep apnea. . She also  has a past surgical history that includes Tonsillectomy (as teen); Adenoidectomy (as child); heent; Hysterectomy (1992); Cholecystectomy, laparoscopic; and other surgical history. Pain:   Patient does not c/o pain                                Hearing: Exceptions to [facility]    Hearing Exceptions: No hearing aid  OBJECTIVE                       Oropharyngeal Phase:     Consistency Presented: Soft & Bite Sized;Pureed;Thin;Mildly Thick  How Presented: SLP-fed/Presented  Oral Residue: Less than 10% of bolus  Aspiration Signs/Symptoms: None        Oral Phase - Comment: Patient consumed thin liquids via spoon x's 2 and via straw x's 2, nectar thick liquids via spoon and straw, purees x's 4, soft soilds x's 2. Clinician assisted with all PO presentations though patient participating in trials - attempting to self feed using spoon with L hand. Patient's oral phase of swallow required increased time for prep/transfer of soft solids. Trace/min residual noted in oral cavity. Pharyngeal Phase: Pharyngeal phase of swallow was unremarkable as able to be assessed at bedside.  [facility] significant for penetration to the vocal folds with thin liquids. PLAN    Duration/Frequency: Continue to follow patient 3x/week for duration of hospitalization and/or until goals met    Dysphagia Outcome and Severity Scale (GURPREET)  Dysphagia Outcome Severity Scale: Level 4: Mild moderate dysphagia- Intermittent supervision/cueing. One - two diet consistencies restricted  Interpretation of Tool: The Dysphagia Outcome and Severity Scale (GURPREET) is a simple, easy-to-use, 7-point scale developed to systematically rate the functional severity of dysphagia based on objective assessment and make recommendations for diet level, independence level, and type of nutrition. Normal(7), Functional(6), Mild(5), Mild-Moderate(4), Moderate(3), Moderate-Severe(2), Severe(1)    Speech Therapy Prognosis  Prognosis: Good    Education: Patient,NP,Family member   Patient Education: Provided to patient and family regarding diet recommendations and plan of care. Family agreeable to plan of care.       Current Medications:   No current facility-administered medications on file prior to encounter. Current Outpatient Medications on File Prior to Encounter   Medication Sig Dispense Refill    ARIPiprazole (ABILIFY) 15 MG tablet Take 15 mg by mouth      aspirin 325 MG tablet Take 325 mg by mouth daily      Cetirizine HCl 10 MG CAPS Take by mouth      docusate (COLACE, DULCOLAX) 100 MG CAPS Take 100 mg by mouth 2 times daily      DULoxetine (CYMBALTA) 60 MG extended release capsule Take 60 mg by mouth      esomeprazole (NEXIUM) 40 MG delayed release capsule Take by mouth daily      fluconazole (DIFLUCAN) 200 MG tablet Take 200 mg by mouth 2 times daily      liothyronine (CYTOMEL) 25 MCG tablet Take 25 mcg by mouth daily      LORazepam (ATIVAN) 2 MG tablet Take 2 mg by mouth 2 times daily.       nitroGLYCERIN (NITROSTAT) 0.4 MG SL tablet Place 0.4 mg under the tongue      oxyCODONE (OXY-IR) 15 MG immediate release tablet Take 15 mg by mouth every 8 hours as needed.  pregabalin (LYRICA) 75 MG capsule Take by mouth.  rosuvastatin (CRESTOR) 40 MG tablet Take 20 mg by mouth      tolterodine (DETROL LA) 4 MG extended release capsule Take 4 mg by mouth daily      traMADol (ULTRAM) 50 MG tablet Take 50 mg by mouth every 6 hours as needed.  zolpidem (AMBIEN) 10 MG tablet Take by mouth.          PRECAUTIONS/ALLERGIES: Acetaminophen, Carisoprodol, Cephalexin, Cyclobenzaprine, Erythromycin, Gabapentin, Hydrocodone-acetaminophen, Metoclopramide, Penicillins, Povidone-iodine, Risperidone, Sulfa antibiotics, Codeine, and Oxycodone-acetaminophen        Therapy Time  SLP Individual Minutes  Time In: 1320  Time Out: [address]  Minutes: [address] [place], [place]  6/13/2022 2:44 PM

## 2022-06-13 NOTE — CARE COORDINATION
Chart reviewed by CM for discharge planning. Patient remains hospitalized, with a LOS of 10 days. According to [facility] with Natalia, patient will need a new LOC approval, before returning. CM will await physican to round on patient, to determine if patient is approaching discharge. CM will initiate LOC request, once anticipated discharge has been confirmed. CM continues to follow plan of care. Disposition- Return to [facility] with LTC. Update 3:02pm- LOC request faxed to [address] [place] [address] with Natalia, notified.

## 2022-06-13 NOTE — PLAN OF CARE
Problem: Discharge Planning  Goal: Discharge to home or other facility with appropriate resources  Outcome: Progressing  Flowsheets (Taken 6/12/2022 1906)  Discharge to home or other facility with appropriate resources: Identify barriers to discharge with patient and caregiver     Problem: Pain  Goal: Verbalizes/displays adequate comfort level or baseline comfort level  Outcome: Progressing     Problem: Skin/Tissue Integrity  Goal: Absence of new skin breakdown  Description: 1. Monitor for areas of redness and/or skin breakdown  2. Assess vascular access sites hourly  3. Every 4-6 hours minimum:  Change oxygen saturation probe site  4. Every 4-6 hours:  If on nasal continuous positive airway pressure, respiratory therapy assess nares and determine need for appliance change or resting period.   Outcome: Progressing     Problem: Safety - Adult  Goal: Free from fall injury  Outcome: Progressing  Flowsheets (Taken 6/12/2022 1906)  Free From Fall Injury: Instruct family/caregiver on patient safety     Problem: ABCDS Injury Assessment  Goal: Absence of physical injury  Outcome: Progressing     Problem: Chronic Conditions and Co-morbidities  Goal: Patient's chronic conditions and co-morbidity symptoms are monitored and maintained or improved  Outcome: Progressing  Flowsheets (Taken 6/12/2022 1906)  Care Plan - Patient's Chronic Conditions and Co-Morbidity Symptoms are Monitored and Maintained or Improved:   Monitor and assess patient's chronic conditions and comorbid symptoms for stability, deterioration, or improvement   Collaborate with multidisciplinary team to address chronic and comorbid conditions and prevent exacerbation or deterioration     Problem: Neurosensory - Adult  Goal: Achieves stable or improved neurological status  Outcome: Progressing  Flowsheets (Taken 6/12/2022 1906)  Achieves stable or improved neurological status: Assess for and report changes in neurological status  Goal: Absence of Musculoskeletal - Adult  Goal: Return mobility to safest level of function  Outcome: Progressing  Flowsheets (Taken 6/12/2022 1906)  Return Mobility to Safest Level of Function: Assist with transfers and ambulation using safe patient handling equipment as needed  Goal: Maintain proper alignment of affected body part  Outcome: Progressing  Flowsheets (Taken 6/12/2022 1906)  Maintain proper alignment of affected body part: Support and protect limb and body alignment per provider's orders  Goal: Return ADL status to a safe level of function  Outcome: Progressing  Flowsheets (Taken 6/12/2022 1906)  Return ADL Status to a Safe Level of Function: Administer medication as ordered     Problem: Gastrointestinal - Adult  Goal: Minimal or absence of nausea and vomiting  Outcome: Progressing  Flowsheets (Taken 6/12/2022 1906)  Minimal or absence of nausea and vomiting: Administer ordered antiemetic medications as needed  Goal: Maintains or returns to baseline bowel function  Outcome: Progressing  Flowsheets (Taken 6/12/2022 1906)  Maintains or returns to baseline bowel function:   Assess bowel function   Encourage oral fluids to ensure adequate hydration  Goal: Maintains adequate nutritional intake  Outcome: Progressing  Flowsheets (Taken 6/12/2022 1906)  Maintains adequate nutritional intake: Monitor percentage of each meal consumed     Problem: Genitourinary - Adult  Goal: Absence of urinary retention  Outcome: Progressing  Flowsheets (Taken 6/12/2022 1906)  Absence of urinary retention: Monitor intake/output and perform bladder scan as needed  Goal: Urinary catheter remains patent  Outcome: Progressing  Flowsheets (Taken 6/12/2022 1906)  Urinary catheter remains patent: Assess patency of urinary catheter     Problem: Infection - Adult  Goal: Absence of infection at discharge  Outcome: Progressing  Flowsheets (Taken 6/12/2022 1906)  Absence of infection at discharge:   Assess and monitor for signs and symptoms of infection Monitor lab/diagnostic results  Goal: Absence of infection during hospitalization  Outcome: Progressing  Flowsheets (Taken 6/12/2022 1906)  Absence of infection during hospitalization:   Assess and monitor for signs and symptoms of infection   Monitor lab/diagnostic results  Goal: Absence of fever/infection during anticipated neutropenic period  Outcome: Progressing  Flowsheets (Taken 6/12/2022 1906)  Absence of fever/infection during anticipated neutropenic period: Monitor white blood cell count     Problem: Metabolic/Fluid and Electrolytes - Adult  Goal: Electrolytes maintained within normal limits  Outcome: Progressing  Flowsheets (Taken 6/12/2022 1906)  Electrolytes maintained within normal limits: Monitor labs and assess patient for signs and symptoms of electrolyte imbalances  Goal: Hemodynamic stability and optimal renal function maintained  Outcome: Progressing  Flowsheets (Taken 6/12/2022 1906)  Hemodynamic stability and optimal renal function maintained:   Monitor labs and assess for signs and symptoms of volume excess or deficit   Monitor intake, output and patient weight  Goal: Glucose maintained within prescribed range  Outcome: Progressing  Flowsheets (Taken 6/12/2022 1906)  Glucose maintained within prescribed range: Monitor blood glucose as ordered     Problem: Hematologic - Adult  Goal: Maintains hematologic stability  Outcome: Progressing  Flowsheets (Taken 6/12/2022 1906)  Maintains hematologic stability: Assess for signs and symptoms of bleeding or hemorrhage

## 2022-06-13 NOTE — PROGRESS NOTES
Hospitalist Progress Note   Admit Date:  6/3/2022 10:16 AM   Name:  Tara Magallanes   Age:  61 y.o. Sex:  female  :  1955   MRN:  [id number]   Room:  [facility]    Presenting Complaint: Altered Mental Status     Reason(s) for Admission: Delirium [R41.0]  Acute febrile illness [U79.2]  Acute metabolic encephalopathy [P35.52]     Hospital Course & Interval History:     Patient with past medical history of  Previous CVA with residual left-sided weakness  Bed-bound  Patient has no teeth but able to eat regular food. CAD  Type 2 diabetes mellitus   history of pulmonary embolism, not on anticoagulation now. Anxiety  Hypothyroidism  Chronic pain  Parkinson disease    Patient was brought to hospital from nursing facility due to change in mental status. Normally patient could speak without issues. No detailed history is available. Patient was found to have elevated blood pressure, with heart rate of 111/min. Potassium was low, procalcitonin was low. Viral panel was negative. UA is positive for nitrites and 4+ bacteria, no white blood cells however. CT head and chest x-ray showed no significant acute findings. Patient was admitted due to change in mental status and for treatment of UTI. Subjective/24hr Events (22):    22   Patient is lying in bed and opens eyes when she is called and stimulated. She does not answer questions. No fever. No shaking. No chills. No report of shortness of breath. No report of chest pain. No diarrhea. No blood per rectum. No blood per urine. 22   Patient is mostly lying in bed and not talking. She does not respond to verbal stimuli. No fever in the last 24 hours. No chest pain. No shortness of breath. Patient is assessed by speech therapist and is allowed to have minced and moist diet now. 22   Patient is lying in bed and does not respond much verbally to stimuli. No fever. No shaking. No chills.    No shortness of breath. 6/7/22   Patient seems to open eyes more easily when she is called. No fever. No shaking. No chills. No chest pain. No shortness of breath. 6/8/22   Patient is resting in bed well. Not much interaction with me when she is called. No fever. No shaking. No chills. No chest pain. No shortness of breath. 6/9/22   Patient is lying comfortably in bed. She opens eyes with loud verbal stimuli. No fever. No shortness of breath. No chest pain. 6/10/22   Patient is resting in bed and does not talk back when asked. No fever. No shaking. No chills. No shortness of breath. No chest pain. 6/11/22   Patient was assessed by neurologist.   Natalee Matamoros is that it is not unexpected for patient of her age and with underlying dementia, to have had acute metabolic encephalopathy from UTI and it could take some time before she recovers from it. No fever. No shaking. No chills. No shortness of breath. 6/12/22   Patient could not stay still for MRI yesterday. MRI was not done. Patient opened eyes to being called loudly. She seems to trying to answer. 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 No fever. No shaking. No chills. No shortness of breath. 6/13/22   Patient is mostly resting in bed. No fever. No shaking. No chills. No chest pain. No shortness of breath. Assessment & Plan:     Principal Problem:    Acute metabolic encephalopathy    UTI (urinary tract infection)  Patient has completed empiric ceftriaxone. Blood culture is negative. Urine culture grew E.coli which is pan-sensitive. Speech therapist deems patient suitable for pureed diet. Mentation does not improve much although she seems more responsive to stimuli. Still not engaging in conversation. I am informed that patient was \"talking\" before coming to hospital.   Additional information is that patient is deaf and can read lips.    CT brain on Meagan 3, 2022 did not show acute findings. Continue close monitoring. Neurologist has given impression that it may take time for patient to recover from acute metabolic encephalopathy. MRI was ordered but patient was not co-operative with exam. At that time I was not comfortable with giving her sedation. Active Problems:    Diabetes mellitus ([address]  Monitor blood sugar. Cover with insulin sliding scale accordingly. Blood sugar is mostly in lower than 100 ranges. That is acceptable. Humalog sliding scale coverage with low-dose regimen. RYAN (obstructive sleep apnea)  Noted       Hypokalemia  Continues to have hypokalemia   K is low again. Aggressive potassium supplementation  Continue potassium 40 mEq po tid     Stroke [facility])  Previous stroke  Continue with aspirin, rosuvastatin. HTN (hypertension)  Blood pressure is mostly in acceptable ranges. Will monitor closely. On PRN medication, Hydralazine. BP is 108/48 mmHg today       Discharge Planning:    to be determined     I called daughter, Shimon Galvin and updated her about the patient. Shimon Galvin told me that patient currently was not much different from her baseline. She is comfortable with patient to be discharged back to her nursing home soon. Diet:  ADULT DIET; Dysphagia - Pureed; Mildly Thick (Nectar)  ADULT ORAL NUTRITION SUPPLEMENT; Dinner, Lunch; Frozen Oral Supplement  DVT PPx: heparin SC   Code status: Full Code    Hospital Problems:  Principal Problem:    Acute metabolic encephalopathy  Active Problems:    UTI (urinary tract infection)    Diabetes mellitus ([facility])    RYAN (obstructive sleep apnea)    Hypokalemia    Stroke ([facility] [address]    HTN (hypertension)  Resolved Problems:    * No resolved hospital problems.  *      Objective:     Patient Vitals for the past 24 hrs:   Temp Pulse Resp BP SpO2   06/13/22 1119 98.6 °F (37 °C) (!) 101 16 (!) 108/48 98 %   06/13/22 0735 -- (!) 101 -- -- --   06/13/22 0734 97.5 °F (36.4 °C) 95 16 (!) 148/70 99 %   06/13/22 0330 98.4 °F (36.9 °C) 94 15 136/76 97 %   06/12/22 2242 98.2 °F (36.8 °C) (!) 101 15 107/71 97 %   06/12/22 1922 98.1 °F (36.7 °C) (!) 105 16 (!) 140/77 98 %   06/12/22 1615 97.9 °F (36.6 °C) 100 18 139/71 95 %       Oxygen Therapy  SpO2: 98 %  Pulse Oximeter Device Mode: Intermittent  O2 Device: None (Room air)  O2 Flow Rate (L/min): 2 L/min  O2 Delivery Method: Nasal cannula    Estimated body mass index is 27.81 kg/m² as calculated from the following:    Height as of this encounter: 5' 4\" (1.626 m). Weight as of 6/11/22: 162 lb (73.5 kg). Intake/Output Summary (Last 24 hours) at 6/13/2022 1249  Last data filed at 6/13/2022 0330  Gross per 24 hour   Intake --   Output 900 ml   Net -900 ml         Physical Exam:     Blood pressure (!) 108/48, pulse (!) 101, temperature 98.6 °F (37 °C), temperature source Oral, resp. rate 16, height 5' 4\" (1.626 m), weight 162 lb 4.1 oz (73.6 kg), SpO2 98 %. General:    Well nourished. Patient is lying flat quietly in bed. She opens eyes when she is called loudly. She does not answer questions. 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 Head:  Normocephalic, atraumatic, not pale. No icterus. Eyes:  Sclerae appear normal.  Pupils equally round. ENT:  Nares appear normal, no drainage. Moist oral mucosa  Neck:  No restricted ROM. Trachea midline   CV:   RRR. No m/r/g. No jugular venous distension. Lungs:   CTAB. No wheezing, rhonchi, or rales. Respirations even, unlabored  Abdomen: Bowel sounds present. Soft, nontender, nondistended. Extremities: No cyanosis or clubbing. No edema  Skin:     No rashes and normal coloration. Warm and dry. Neuro:  As mentioned above, patient responded minimally to verbal stimuli. Does not seem to have localized weakness  No spasticity of her extremities.        I have reviewed ordered lab tests and independently visualized imaging below:    Recent Labs:  Recent Results (from the past 48 hour(s))   POCT Glucose    Collection Time: 06/11/22 4:16 PM   Result Value Ref Range    POC Glucose 123 (H) 65 - 100 mg/dL    Performed by: Ilene    POCT Glucose    Collection Time: 06/11/22  8:21 PM   Result Value Ref Range    POC Glucose 86 65 - 100 mg/dL    Performed by: Sheri    POCT Glucose    Collection Time: 06/12/22  7:19 AM   Result Value Ref Range    POC Glucose 93 65 - 100 mg/dL    Performed by: Dinah    Basic Metabolic Panel    Collection Time: 06/12/22  7:49 AM   Result Value Ref Range    Sodium 144 136 - 145 mmol/L    Potassium 3.0 (L) 3.5 - 5.1 mmol/L    Chloride 112 (H) 98 - 107 mmol/L    CO2 21 21 - 32 mmol/L    Anion Gap 11 7 - 16 mmol/L    Glucose 92 65 - 100 mg/dL    BUN 2 (L) 8 - 23 MG/DL    CREATININE 0.40 (L) 0.6 - 1.0 MG/DL    GFR African American >60 >60 ml/min/1.73m2    GFR Non- >60 >60 ml/min/1.73m2    Calcium 8.8 8.3 - 10.4 MG/DL   Magnesium    Collection Time: 06/12/22  7:49 AM   Result Value Ref Range    Magnesium 2.0 1.8 - 2.4 mg/dL   CBC with Auto Differential    Collection Time: 06/12/22  7:49 AM   Result Value Ref Range    WBC 6.1 4.3 - 11.1 K/uL    RBC 4.19 4.05 - 5.2 M/uL    Hemoglobin 11.4 (L) 11.7 - 15.4 g/dL    Hematocrit 36.4 35.8 - 46.3 %    MCV 86.9 79.6 - 97.8 FL    MCH 27.2 26.1 - 32.9 PG    MCHC 31.3 (L) 31.4 - 35.0 g/dL    RDW 14.3 11.9 - 14.6 %    Platelets 471 258 - 383 K/uL    MPV 8.9 (L) 9.4 - 12.3 FL    nRBC 0.00 0.0 - 0.2 K/uL    Differential Type AUTOMATED      Seg Neutrophils 55 43 - 78 %    Lymphocytes 24 13 - 44 %    Monocytes 11 4.0 - 12.0 %    Eosinophils % 6 0.5 - 7.8 %    Basophils 1 0.0 - 2.0 %    Immature Granulocytes 3 0.0 - 5.0 %    Segs Absolute 3.4 1.7 - 8.2 K/UL    Absolute Lymph # 1.4 0.5 - 4.6 K/UL    Absolute Mono # 0.7 0.1 - 1.3 K/UL    Absolute Eos # 0.3 0.0 - 0.8 K/UL    Basophils Absolute 0.1 0.0 - 0.2 K/UL    Absolute Immature Granulocyte 0.2 0.0 - 0.5 K/UL   POCT Glucose    Collection Time: 06/12/22 12:17 PM   Result Value Ref Range    POC Glucose 95 65 - 100 mg/dL    Performed by: [employer]    POCT Glucose    Collection Time: 06/12/22  4:13 PM   Result Value Ref Range    POC Glucose 123 (H) 65 - 100 mg/dL    Performed by: [employer]    POCT Glucose    Collection Time: 06/12/22  8:19 PM   Result Value Ref Range    POC Glucose 218 (H) 65 - 100 mg/dL    Performed by: José Miguel Hill    POCT Glucose    Collection Time: 06/13/22  7:30 AM   Result Value Ref Range    POC Glucose 82 65 - 100 mg/dL    Performed by: Maria Fernanda    POCT Glucose    Collection Time: 06/13/22 11:18 AM   Result Value Ref Range    POC Glucose 114 (H) 65 - 100 mg/dL    Performed by: Darien Sykes        Other Studies:  CT HEAD WO CONTRAST    Result Date: 6/3/2022  CT of the head without contrast. CLINICAL INDICATION: Lethargy, altered mental status PROCEDURE: Serial thin section axial images are obtained from the cranial vertex through the skull base without the administration of intravenous contrast. Radiation dose reduction techniques were used for this study. Our CT scanners use one or all of the following: Automated exposure control, adjusted of the mA and/or kV according to patient size, iterative reconstruction COMPARISON: Head CT dated 2/15/2013. FINDINGS: There is no acute intracranial hemorrhage, mass, or mass effect. No abnormal extra-axial fluid collections identified. There is no hydrocephalus. The basilar cisterns are widely patent. Extensive bilateral patchy white matter hypoattenuation again noted. It is unchanged. No findings present to indicate large, cortical-based territorial infarction. There is a small old right basal ganglia lacunar infarct. No skull fracture or aggressive osseous lesion noted. Thick mucoperiosteal thickening is noted in the right maxillary sinus. The mastoid air cells are clear. 1. No acute intracranial abnormality. 2. Extensive bilateral white matter hypoattenuation most in keeping with chronic microangiopathic disease.  3. Chronic appearing mucoperiosteal thickening in the right maxillary sinus. XR CHEST PORTABLE    Result Date: 6/3/2022  Portable chest x-ray CLINICAL INDICATION: Altered mental status. FINDINGS: Single AP view the chest compared to a similar exam dated 8/17/2006 shows mild subsegmental atelectasis in the right midlung. Otherwise the lung parenchyma is clear. There is no pleural effusion or pneumothorax. The cardiac silhouette and mediastinum are unremarkable. Mild subsegmental atelectasis in the right midlung. Otherwise no acute abnormality.       Current Meds:  Current Facility-Administered Medications   Medication Dose Route Frequency    insulin lispro (HUMALOG) injection vial 0-4 Units  0-4 Units SubCUTAneous TID WC    insulin lispro (HUMALOG) injection vial 0-4 Units  0-4 Units SubCUTAneous Nightly    pantoprazole (PROTONIX) tablet 40 mg  40 mg Oral QAM AC    potassium chloride (KLOR-CON M) extended release tablet 40 mEq  40 mEq Oral TID WC    nystatin (MYCOSTATIN) 180719 UNIT/ML suspension 500,000 Units  5 mL Oral 4x Daily    aspirin tablet 325 mg  325 mg Oral Daily    rosuvastatin (CRESTOR) tablet 20 mg  20 mg Oral Nightly    0.9 % sodium chloride infusion   IntraVENous Continuous    glucose chewable tablet 16 g  4 tablet Oral PRN    dextrose bolus 10% 125 mL  125 mL IntraVENous PRN    Or    dextrose bolus 10% 250 mL  250 mL IntraVENous PRN    glucagon injection 1 mg  1 mg IntraMUSCular PRN    dextrose 5 % solution  100 mL/hr IntraVENous PRN    metoprolol (LOPRESSOR) injection 5 mg  5 mg IntraVENous Q6H PRN    sodium chloride flush 0.9 % injection 3 mL  3 mL IntraVENous Q8H    DULoxetine (CYMBALTA) extended release capsule 120 mg  120 mg Oral Daily    levothyroxine (SYNTHROID) tablet 25 mcg  25 mcg Oral Daily    carbidopa-levodopa (SINEMET)  MG per tablet 2 tablet  2 tablet Oral TID    carboxymethylcellulose (THERATEARS) 1 % ophthalmic gel 1 drop  1 drop Both Eyes Q12H    ARIPiprazole (ABILIFY) tablet 15 mg  15 mg Oral Daily    topiramate (TOPAMAX) tablet 50 mg  50 mg Oral Daily    divalproex (DEPAKOTE SPRINKLE) capsule 250 mg  250 mg Oral 3 times per day    traZODone (DESYREL) tablet 25 mg  25 mg Oral Nightly    sodium chloride flush 0.9 % injection 5-40 mL  5-40 mL IntraVENous PRN    sodium chloride flush 0.9 % injection 5-40 mL  5-40 mL IntraVENous 2 times per day    sodium chloride flush 0.9 % injection 5-40 mL  5-40 mL IntraVENous PRN    0.9 % sodium chloride infusion   IntraVENous PRN    polyethylene glycol (GLYCOLAX) packet 17 g  17 g Oral Daily PRN    acetaminophen (TYLENOL) tablet 650 mg  650 mg Oral Q6H PRN    Or    acetaminophen (TYLENOL) suppository 650 mg  650 mg Rectal Q6H PRN    heparin (porcine) injection 5,000 Units  5,000 Units SubCUTAneous 3 times per day    hydrALAZINE (APRESOLINE) injection 20 mg  20 mg IntraVENous Q6H PRN    butalbital-acetaminophen-caffeine (FIORICET, ESGIC) per tablet 1 tablet  1 tablet Oral Q4H PRN       Signed:  Dalton Harris MD    Part of this note may have been written by using a voice dictation software. The note has been proof read but may still contain some grammatical/other typographical errors.

## 2022-06-13 NOTE — PROGRESS NOTES
OT note:  Pt is deaf and  not available. Pt cannot cognitively use the interpreting communicator. Pt was picked up on a trial basis but she is bed bound and lives in a long term care facility. It is noted that pt \"fed herself\" to a degree. Will hold treatment today and attempt to see pt at meal time next session since this may be the only skilled activity pt was able to complete.   Antoine Brown

## 2022-06-13 NOTE — PROGRESS NOTES
Has been Awake and watching TV this shift. Appetite poor. Turned every 2 hours. No distress noted. Hourly rounds completed, all needs met. Will continue to monitor until nighit shift nurse takes over. Bed locked in low position with call light in reach, bed alarm on.

## 2022-06-14 VITALS
DIASTOLIC BLOOD PRESSURE: 65 MMHG | SYSTOLIC BLOOD PRESSURE: 91 MMHG | HEART RATE: 93 BPM | RESPIRATION RATE: 18 BRPM | OXYGEN SATURATION: 99 % | WEIGHT: 162.26 LBS | HEIGHT: 64 IN | BODY MASS INDEX: 27.7 KG/M2 | TEMPERATURE: 97.9 F

## 2022-06-14 LAB
ANION GAP SERPL CALC-SCNC: 7 MMOL/L (ref 7–16)
BUN SERPL-MCNC: 3 MG/DL (ref 8–23)
CALCIUM SERPL-MCNC: 9.4 MG/DL (ref 8.3–10.4)
CHLORIDE SERPL-SCNC: 109 MMOL/L (ref 98–107)
CO2 SERPL-SCNC: 22 MMOL/L (ref 21–32)
CREAT SERPL-MCNC: 0.4 MG/DL (ref 0.6–1)
GLUCOSE BLD STRIP.AUTO-MCNC: 106 MG/DL (ref 65–100)
GLUCOSE BLD STRIP.AUTO-MCNC: 107 MG/DL (ref 65–100)
GLUCOSE BLD STRIP.AUTO-MCNC: 79 MG/DL (ref 65–100)
GLUCOSE SERPL-MCNC: 100 MG/DL (ref 65–100)
POTASSIUM SERPL-SCNC: 5.1 MMOL/L (ref 3.5–5.1)
SARS-COV-2 RDRP RESP QL NAA+PROBE: NOT DETECTED
SERVICE CMNT-IMP: ABNORMAL
SERVICE CMNT-IMP: ABNORMAL
SERVICE CMNT-IMP: NORMAL
SODIUM SERPL-SCNC: 138 MMOL/L (ref 136–145)
SOURCE: NORMAL

## 2022-06-14 PROCEDURE — 2580000003 HC RX 258: Performed by: EMERGENCY MEDICINE

## 2022-06-14 PROCEDURE — 82962 GLUCOSE BLOOD TEST: CPT

## 2022-06-14 PROCEDURE — 87635 SARS-COV-2 COVID-19 AMP PRB: CPT

## 2022-06-14 PROCEDURE — 2580000003 HC RX 258: Performed by: FAMILY MEDICINE

## 2022-06-14 PROCEDURE — 36415 COLL VENOUS BLD VENIPUNCTURE: CPT

## 2022-06-14 PROCEDURE — 80048 BASIC METABOLIC PNL TOTAL CA: CPT

## 2022-06-14 PROCEDURE — 6370000000 HC RX 637 (ALT 250 FOR IP): Performed by: INTERNAL MEDICINE

## 2022-06-14 PROCEDURE — 6360000002 HC RX W HCPCS: Performed by: FAMILY MEDICINE

## 2022-06-14 PROCEDURE — 6370000000 HC RX 637 (ALT 250 FOR IP): Performed by: FAMILY MEDICINE

## 2022-06-14 RX ORDER — LEVOTHYROXINE SODIUM 0.03 MG/1
25 TABLET ORAL DAILY
Qty: 30 TABLET | Refills: 0 | Status: SHIPPED | OUTPATIENT
Start: 2022-06-15 | End: 2022-07-15

## 2022-06-14 RX ORDER — TOPIRAMATE 50 MG/1
50 TABLET, FILM COATED ORAL DAILY
Qty: 30 TABLET | Refills: 0 | Status: SHIPPED | OUTPATIENT
Start: 2022-06-15 | End: 2022-07-15

## 2022-06-14 RX ORDER — DIVALPROEX SODIUM 125 MG/1
250 CAPSULE, COATED PELLETS ORAL EVERY 8 HOURS SCHEDULED
Qty: 180 CAPSULE | Refills: 0 | Status: SHIPPED | OUTPATIENT
Start: 2022-06-14 | End: 2022-07-14

## 2022-06-14 RX ADMIN — DULOXETINE HYDROCHLORIDE 120 MG: 30 CAPSULE, DELAYED RELEASE ORAL at 09:10

## 2022-06-14 RX ADMIN — TOPIRAMATE 50 MG: 50 TABLET ORAL at 09:10

## 2022-06-14 RX ADMIN — SODIUM CHLORIDE, PRESERVATIVE FREE 10 ML: 5 INJECTION INTRAVENOUS at 09:11

## 2022-06-14 RX ADMIN — PANTOPRAZOLE SODIUM 40 MG: 40 TABLET, DELAYED RELEASE ORAL at 05:12

## 2022-06-14 RX ADMIN — SODIUM CHLORIDE, PRESERVATIVE FREE 3 ML: 5 INJECTION INTRAVENOUS at 01:33

## 2022-06-14 RX ADMIN — CARBIDOPA AND LEVODOPA 2 TABLET: 25; 100 TABLET ORAL at 09:10

## 2022-06-14 RX ADMIN — CARBIDOPA AND LEVODOPA 2 TABLET: 25; 100 TABLET ORAL at 14:18

## 2022-06-14 RX ADMIN — CARBOXYMETHYLCELLULOSE SODIUM 1 DROP: 10 GEL OPHTHALMIC at 09:12

## 2022-06-14 RX ADMIN — ARIPIPRAZOLE 15 MG: 5 TABLET ORAL at 09:10

## 2022-06-14 RX ADMIN — DIVALPROEX SODIUM 250 MG: 125 CAPSULE ORAL at 05:12

## 2022-06-14 RX ADMIN — DIVALPROEX SODIUM 250 MG: 125 CAPSULE ORAL at 14:18

## 2022-06-14 RX ADMIN — NYSTATIN 500000 UNITS: 100000 SUSPENSION ORAL at 14:17

## 2022-06-14 RX ADMIN — LEVOTHYROXINE SODIUM 25 MCG: 0.05 TABLET ORAL at 05:12

## 2022-06-14 RX ADMIN — ASPIRIN 325 MG ORAL TABLET 325 MG: 325 PILL ORAL at 09:10

## 2022-06-14 RX ADMIN — NYSTATIN 500000 UNITS: 100000 SUSPENSION ORAL at 09:10

## 2022-06-14 RX ADMIN — HEPARIN SODIUM 5000 UNITS: 5000 INJECTION INTRAVENOUS; SUBCUTANEOUS at 14:18

## 2022-06-14 RX ADMIN — POTASSIUM CHLORIDE 40 MEQ: 20 TABLET, EXTENDED RELEASE ORAL at 09:10

## 2022-06-14 RX ADMIN — HEPARIN SODIUM 5000 UNITS: 5000 INJECTION INTRAVENOUS; SUBCUTANEOUS at 05:11

## 2022-06-14 ASSESSMENT — PAIN SCALES - GENERAL: PAINLEVEL_OUTOF10: 0

## 2022-06-14 NOTE — PROGRESS NOTES
Hourly rounds in progress. Alert, nonverbal, complete care. On RA,  HR >100. Kept turned. Francois patent. IVF infusing. On nectar thick, meds crushed.

## 2022-06-14 NOTE — PROGRESS NOTES
Hospitalist Progress Note   Admit Date:  6/3/2022 10:16 AM   Name:  Tara Magallanes   Age:  61 y.o. Sex:  female  :  1955   MRN:  [id number]   Room:  [facility]    Presenting Complaint: Altered Mental Status     Reason(s) for Admission: Delirium [R41.0]  Acute febrile illness [E25.3]  Acute metabolic encephalopathy [Q64.02]     Hospital Course & Interval History:     Patient with past medical history of  Previous CVA with residual left-sided weakness  Bed-bound  Patient has no teeth but able to eat regular food. CAD  Type 2 diabetes mellitus   history of pulmonary embolism, not on anticoagulation now. Anxiety  Hypothyroidism  Chronic pain  Parkinson disease    Patient was brought to hospital from nursing facility due to change in mental status. Normally patient could speak without issues. No detailed history is available. Patient was found to have elevated blood pressure, with heart rate of 111/min. Potassium was low, procalcitonin was low. Viral panel was negative. UA is positive for nitrites and 4+ bacteria, no white blood cells however. CT head and chest x-ray showed no significant acute findings. Patient was admitted due to change in mental status and for treatment of UTI. Subjective/24hr Events (22):    22   Patient is lying in bed and opens eyes when she is called and stimulated. She does not answer questions. No fever. No shaking. No chills. No report of shortness of breath. No report of chest pain. No diarrhea. No blood per rectum. No blood per urine. 22   Patient is mostly lying in bed and not talking. She does not respond to verbal stimuli. No fever in the last 24 hours. No chest pain. No shortness of breath. Patient is assessed by speech therapist and is allowed to have minced and moist diet now. 22   Patient is lying in bed and does not respond much verbally to stimuli. No fever. No shaking. No chills.    No shortness of breath. 6/7/22   Patient seems to open eyes more easily when she is called. No fever. No shaking. No chills. No chest pain. No shortness of breath. 6/8/22   Patient is resting in bed well. Not much interaction with me when she is called. No fever. No shaking. No chills. No chest pain. No shortness of breath. 6/9/22   Patient is lying comfortably in bed. She opens eyes with loud verbal stimuli. No fever. No shortness of breath. No chest pain. 6/10/22   Patient is resting in bed and does not talk back when asked. No fever. No shaking. No chills. No shortness of breath. No chest pain. 6/11/22   Patient was assessed by neurologist.   Lara Irizarry is that it is not unexpected for patient of her age and with underlying dementia, to have had acute metabolic encephalopathy from UTI and it could take some time before she recovers from it. No fever. No shaking. No chills. No shortness of breath. 6/12/22   Patient could not stay still for MRI yesterday. MRI was not done. Patient opened eyes to being called loudly. She seems to trying to answer. 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 No fever. No shaking. No chills. No shortness of breath. 6/13/22   Patient is mostly resting in bed. No fever. No shaking. No chills. No chest pain. No shortness of breath. 6/14/22   Patient has no fever. She has been resting mostly in bed. No chest pain. No shortness of breath. Assessment & Plan:     Principal Problem:    Acute metabolic encephalopathy    UTI (urinary tract infection)  Patient has completed empiric ceftriaxone. Blood culture is negative. Urine culture grew E.coli which is pan-sensitive. Speech therapist deems patient suitable for pureed diet. I am informed that patient was \"talking\" before coming to hospital.   Additional information is that patient is deaf and can read lips.    CT brain on Meagan 3, 2022 did not show acute findings. Continue close monitoring. Neurologist has given impression that it may take time for patient to recover from acute metabolic encephalopathy. MRI was ordered but patient was not co-operative with exam. At that time I was not comfortable with giving her sedation. It id deemed that MRI of brain is not absolutely necessary. I discussed with patient's daughter, [employer] and am told that patient's condition is at baseline. Active Problems:    Diabetes mellitus ([address]  Monitor blood sugar. Cover with insulin sliding scale accordingly. Blood sugar is mostly in lower than 100 ranges. That is acceptable. Humalog sliding scale coverage with low-dose regimen. RYAN (obstructive sleep apnea)  Noted       Hypokalemia  Continues to have hypokalemia   K is elevated. Aggressive potassium supplementation  Will stop K supplement. Check BMP tomorrow. Stroke [facility])  Previous stroke  Continue with aspirin, rosuvastatin. HTN (hypertension)  Blood pressure is mostly in acceptable ranges. Will monitor closely. On PRN medication, Hydralazine. BP is 126/80 mmHg today       Discharge Planning:    to be determined     I called daughter, [employer] on June 13, 2022 and updated her about the patient. [employer] told me that patient currently was not much different from her baseline. She is comfortable with patient to be discharged back to her nursing home soon. Diet:  ADULT ORAL NUTRITION SUPPLEMENT; Dinner, Lunch; Frozen Oral Supplement  ADULT DIET; Dysphagia - Minced and Moist; Mildly Thick (Nectar)  DVT PPx: heparin SC   Code status: Full Code    Hospital Problems:  Principal Problem:    Acute metabolic encephalopathy  Active Problems:    UTI (urinary tract infection)    Diabetes mellitus (H[facility])    RYAN (obstructive sleep apnea)    Hypokalemia    Stroke (HCC)    HTN (hypertension)  Resolved Problems:    * No resolved hospital problems.  *      Objective:     Patient Vitals for the past 24 hrs:   Temp Pulse Resp BP SpO2   06/14/22 1126 98.2 °F (36.8 °C) (!) 101 18 126/80 98 %   06/14/22 0747 98.1 °F (36.7 °C) (!) 107 18 (!) 144/98 100 %   06/14/22 0304 98.2 °F (36.8 °C) (!) 104 17 (!) 150/84 100 %   06/13/22 2245 97.7 °F (36.5 °C) (!) 108 16 (!) 116/50 97 %   06/13/22 1902 97.9 °F (36.6 °C) 100 16 121/71 98 %   06/13/22 1545 97.5 °F (36.4 °C) 94 16 (!) 115/90 99 %       Oxygen Therapy  SpO2: 98 %  Pulse Oximeter Device Mode: Intermittent  O2 Device: None (Room air)  O2 Flow Rate (L/min): 2 L/min  O2 Delivery Method: Nasal cannula    Estimated body mass index is 27.81 kg/m² as calculated from the following:    Height as of this encounter: 5' 4\" (1.626 m). Weight as of 6/11/22: 162 lb (73.5 kg). Intake/Output Summary (Last 24 hours) at 6/14/2022 1153  Last data filed at 6/14/2022 0314  Gross per 24 hour   Intake 90 ml   Output 1050 ml   Net -960 ml         Physical Exam:     Blood pressure 126/80, pulse (!) 101, temperature 98.2 °F (36.8 °C), resp. rate 18, height 5' 4\" (1.626 m), weight 162 lb 4.1 oz (73.6 kg), SpO2 98 %. General:    Well nourished. Patient is lying flat quietly in bed. She opens eyes when she is called loudly. She does not answer questions. 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 Head:  Normocephalic, atraumatic, not pale. No icterus. Eyes:  Sclerae appear normal.  Pupils equally round. ENT:  Nares appear normal, no drainage. Moist oral mucosa  Neck:  No restricted ROM. Trachea midline   CV:   RRR. No m/r/g. No jugular venous distension. Lungs:   CTAB. No wheezing, rhonchi, or rales. Respirations even, unlabored  Abdomen: Bowel sounds present. Soft, nontender, nondistended. Extremities: No cyanosis or clubbing. No edema  Skin:     No rashes and normal coloration. Warm and dry. Neuro:  As mentioned above, patient responded minimally to verbal stimuli. Does not seem to have localized weakness  No spasticity of her extremities.        I have reviewed ordered lab tests and independently visualized imaging below:    Recent Labs:  Recent Results (from the past 48 hour(s))   POCT Glucose    Collection Time: 06/12/22 12:17 PM   Result Value Ref Range    POC Glucose 95 65 - 100 mg/dL    Performed by: TwinstyPCA    POCT Glucose    Collection Time: 06/12/22  4:13 PM   Result Value Ref Range    POC Glucose 123 (H) 65 - 100 mg/dL    Performed by: CrossMistyPCA    POCT Glucose    Collection Time: 06/12/22  8:19 PM   Result Value Ref Range    POC Glucose 218 (H) 65 - 100 mg/dL    Performed by: Ty Switchcam    POCT Glucose    Collection Time: 06/13/22  7:30 AM   Result Value Ref Range    POC Glucose 82 65 - 100 mg/dL    Performed by: PruittLindAllie    POCT Glucose    Collection Time: 06/13/22 11:18 AM   Result Value Ref Range    POC Glucose 114 (H) 65 - 100 mg/dL    Performed by: PruittLindaV    POCT Glucose    Collection Time: 06/13/22  3:42 PM   Result Value Ref Range    POC Glucose 91 65 - 100 mg/dL    Performed by: PruittLindaV    POCT Glucose    Collection Time: 06/13/22  8:07 PM   Result Value Ref Range    POC Glucose 98 65 - 100 mg/dL    Performed by: Ty Switchcam    Basic Metabolic Panel    Collection Time: 06/14/22  5:20 AM   Result Value Ref Range    Sodium 138 136 - 145 mmol/L    Potassium 5.1 3.5 - 5.1 mmol/L    Chloride 109 (H) 98 - 107 mmol/L    CO2 22 21 - 32 mmol/L    Anion Gap 7 7 - 16 mmol/L    Glucose 100 65 - 100 mg/dL    BUN 3 (L) 8 - 23 MG/DL    CREATININE 0.40 (L) 0.6 - 1.0 MG/DL    GFR African American >60 >60 ml/min/1.73m2    GFR Non- >60 >60 ml/min/1.73m2    Calcium 9.4 8.3 - 10.4 MG/DL   POCT Glucose    Collection Time: 06/14/22  7:48 AM   Result Value Ref Range    POC Glucose 79 65 - 100 mg/dL    Performed by: TwinstyPCA    POCT Glucose    Collection Time: 06/14/22 11:26 AM   Result Value Ref Range    POC Glucose 107 (H) 65 - 100 mg/dL    Performed by: Dinah        Other Studies:  CT HEAD WO CONTRAST    Result Date: 6/3/2022  CT of the head without contrast. CLINICAL INDICATION: Lethargy, altered mental status PROCEDURE: Serial thin section axial images are obtained from the cranial vertex through the skull base without the administration of intravenous contrast. Radiation dose reduction techniques were used for this study. Our CT scanners use one or all of the following: Automated exposure control, adjusted of the mA and/or kV according to patient size, iterative reconstruction COMPARISON: Head CT dated 2/15/2013. FINDINGS: There is no acute intracranial hemorrhage, mass, or mass effect. No abnormal extra-axial fluid collections identified. There is no hydrocephalus. The basilar cisterns are widely patent. Extensive bilateral patchy white matter hypoattenuation again noted. It is unchanged. No findings present to indicate large, cortical-based territorial infarction. There is a small old right basal ganglia lacunar infarct. No skull fracture or aggressive osseous lesion noted. Thick mucoperiosteal thickening is noted in the right maxillary sinus. The mastoid air cells are clear. 1. No acute intracranial abnormality. 2. Extensive bilateral white matter hypoattenuation most in keeping with chronic microangiopathic disease. 3. Chronic appearing mucoperiosteal thickening in the right maxillary sinus. XR CHEST PORTABLE    Result Date: 6/3/2022  Portable chest x-ray CLINICAL INDICATION: Altered mental status. FINDINGS: Single AP view the chest compared to a similar exam dated 8/17/2006 shows mild subsegmental atelectasis in the right midlung. Otherwise the lung parenchyma is clear. There is no pleural effusion or pneumothorax. The cardiac silhouette and mediastinum are unremarkable. Mild subsegmental atelectasis in the right midlung. Otherwise no acute abnormality.       Current Meds:  Current Facility-Administered Medications   Medication Dose Route Frequency    insulin lispro (HUMALOG) injection vial 0-4 Units  0-4 Units SubCUTAneous TID     insulin lispro (HUMALOG) injection vial 0-4 Units  0-4 Units SubCUTAneous Nightly    pantoprazole (PROTONIX) tablet 40 mg  40 mg Oral QAM AC    potassium chloride (KLOR-CON M) extended release tablet 40 mEq  40 mEq Oral TID     nystatin (MYCOSTATIN) 664011 UNIT/ML suspension 500,000 Units  5 mL Oral 4x Daily    aspirin tablet 325 mg  325 mg Oral Daily    rosuvastatin (CRESTOR) tablet 20 mg  20 mg Oral Nightly    0.9 % sodium chloride infusion   IntraVENous Continuous    glucose chewable tablet 16 g  4 tablet Oral PRN    dextrose bolus 10% 125 mL  125 mL IntraVENous PRN    Or    dextrose bolus 10% 250 mL  250 mL IntraVENous PRN    glucagon injection 1 mg  1 mg IntraMUSCular PRN    dextrose 5 % solution  100 mL/hr IntraVENous PRN    metoprolol (LOPRESSOR) injection 5 mg  5 mg IntraVENous Q6H PRN    DULoxetine (CYMBALTA) extended release capsule 120 mg  120 mg Oral Daily    levothyroxine (SYNTHROID) tablet 25 mcg  25 mcg Oral Daily    carbidopa-levodopa (SINEMET)  MG per tablet 2 tablet  2 tablet Oral TID    carboxymethylcellulose (THERATEARS) 1 % ophthalmic gel 1 drop  1 drop Both Eyes Q12H    ARIPiprazole (ABILIFY) tablet 15 mg  15 mg Oral Daily    topiramate (TOPAMAX) tablet 50 mg  50 mg Oral Daily    divalproex (DEPAKOTE SPRINKLE) capsule 250 mg  250 mg Oral 3 times per day    traZODone (DESYREL) tablet 25 mg  25 mg Oral Nightly    sodium chloride flush 0.9 % injection 5-40 mL  5-40 mL IntraVENous PRN    sodium chloride flush 0.9 % injection 5-40 mL  5-40 mL IntraVENous 2 times per day    sodium chloride flush 0.9 % injection 5-40 mL  5-40 mL IntraVENous PRN    0.9 % sodium chloride infusion   IntraVENous PRN    polyethylene glycol (GLYCOLAX) packet 17 g  17 g Oral Daily PRN    acetaminophen (TYLENOL) tablet 650 mg  650 mg Oral Q6H PRN    Or    acetaminophen (TYLENOL) suppository 650 mg  650 mg Rectal Q6H PRN    heparin (porcine) injection 5,000 Units  5,000 Units SubCUTAneous 3 times per day    hydrALAZINE (APRESOLINE) injection 20 mg  20 mg IntraVENous Q6H PRN    butalbital-acetaminophen-caffeine (FIORICET, ESGIC) per tablet 1 tablet  1 tablet Oral Q4H PRN       Signed:  Natalia Silva MD    Part of this note may have been written by using a voice dictation software. The note has been proof read but may still contain some grammatical/other typographical errors.

## 2022-06-14 NOTE — DISCHARGE SUMMARY
Hospitalist Discharge Summary   Admit Date:  6/3/2022 10:16 AM   DC Note date: 2022  Name:  Hui Swenson   Age:  61 y.o. Sex:  female  :  1955   MRN:  [id number]   Room:  [facility]  PCP:  PROVIDER UNKNOWN, MD    Presenting Complaint: Altered Mental Status     Initial Admission Diagnosis: Delirium [R41.0]  Acute febrile illness [N46.2]  Acute metabolic encephalopathy [E10.39]     Problem List for this Hospitalization (present on admission):    Principal Problem:    Acute metabolic encephalopathy  Active Problems:    UTI (urinary tract infection)    Diabetes mellitus ([facility] [address]    RYAN (obstructive sleep apnea)    Hypokalemia    Stroke ([facility] [address]    HTN (hypertension)  Resolved Problems:    * No resolved hospital problems. *    Did Patient have Sepsis (YES OR NO): No     Hospital Course:  Patient with past medical history of  Previous CVA with residual left-sided weakness  Bed-bound  Patient has no teeth but able to eat regular food. CAD  Type 2 diabetes mellitus   history of pulmonary embolism, not on anticoagulation now. Anxiety  Hypothyroidism  Chronic pain  Parkinson disease     Patient was brought to hospital from nursing facility due to change in mental status.      Normally patient could speak without issues. No detailed history is available.      Patient was found to have elevated blood pressure, with heart rate of 111/min.      Potassium was low, procalcitonin was low. Viral panel was negative. UA is positive for nitrites and 4+ bacteria, no white blood cells however.      CT head and chest x-ray showed no significant acute findings.      Patient was admitted due to change in mental status and for treatment of UTI. Patient was assessed by neurologist.   Tarun Hester is that it is not unexpected for patient of her age and with underlying dementia, to have had acute metabolic encephalopathy from UTI and it could take some time before she recovers from it.      I discussed with patient's daughter, Haresh Thakur and am told that patient's condition is at baseline. She is being discharged to a nursing home where she was prior to admission . Disposition: nursing home   Diet: ADULT ORAL NUTRITION SUPPLEMENT; Dinner, Lunch; Frozen Oral Supplement  ADULT DIET; Dysphagia - Minced and Moist; Mildly Thick (Nectar)  Code Status: Full Code    Follow Ups:   Contact information for after-discharge care     Discharge [address] [facility]) . Service: Skilled Nursing  Contact information:  [id number] [address] [phone]                           Follow up labs/diagnostics (ultimately defer to outpatient provider):  See primary doctor     Time spent in patient discharge and coordination 33 minutes. Plan was discussed with patient and care team.  All questions answered. Patient was stable at time of discharge. Instructions given to call a physician or return if any concerns. Current Discharge Medication List      START taking these medications    Details   carbidopa-levodopa (SINEMET)  MG per tablet Take 2 tablets by mouth 3 times daily  Qty: 180 tablet, Refills: 0      levothyroxine (SYNTHROID) 25 MCG tablet Take 1 tablet by mouth Daily  Qty: 30 tablet, Refills: 0      topiramate (TOPAMAX) 50 MG tablet Take 1 tablet by mouth daily  Qty: 30 tablet, Refills: 0      divalproex (DEPAKOTE SPRINKLE) 125 MG capsule Take 2 capsules by mouth every 8 hours  Qty: 180 capsule, Refills: 0         CONTINUE these medications which have NOT CHANGED    Details   ARIPiprazole (ABILIFY) 15 MG tablet Take 15 mg by mouth      aspirin 325 MG tablet Take 325 mg by mouth daily      DULoxetine (CYMBALTA) 60 MG extended release capsule Take 60 mg by mouth      esomeprazole (NEXIUM) 40 MG delayed release capsule Take by mouth daily      liothyronine (CYTOMEL) 25 MCG tablet Take 25 mcg by mouth daily      pregabalin (LYRICA) 75 MG capsule Take by mouth. rosuvastatin (CRESTOR) 40 MG tablet Take 20 mg by mouth      tolterodine (DETROL LA) 4 MG extended release capsule Take 4 mg by mouth daily         STOP taking these medications       Cetirizine HCl 10 MG CAPS Comments:   Reason for Stopping:         docusate (COLACE, DULCOLAX) 100 MG CAPS Comments:   Reason for Stopping:         fluconazole (DIFLUCAN) 200 MG tablet Comments:   Reason for Stopping:         LORazepam (ATIVAN) 2 MG tablet Comments:   Reason for Stopping:         nitroGLYCERIN (NITROSTAT) 0.4 MG SL tablet Comments:   Reason for Stopping:         oxyCODONE (OXY-IR) 15 MG immediate release tablet Comments:   Reason for Stopping:         traMADol (ULTRAM) 50 MG tablet Comments:   Reason for Stopping:         zolpidem (AMBIEN) 10 MG tablet Comments:   Reason for Stopping:               Procedures done this admission:  * No surgery found *    Consults this admission:  IP CONSULT TO NEUROLOGY  FOLLOW UP VISIT    Echocardiogram results:  No results found for this or any previous visit. Diagnostic Imaging/Tests:   CT HEAD WO CONTRAST    Result Date: 6/3/2022  CT of the head without contrast. CLINICAL INDICATION: Lethargy, altered mental status PROCEDURE: Serial thin section axial images are obtained from the cranial vertex through the skull base without the administration of intravenous contrast. Radiation dose reduction techniques were used for this study. Our CT scanners use one or all of the following: Automated exposure control, adjusted of the mA and/or kV according to patient size, iterative reconstruction COMPARISON: Head CT dated 2/15/2013. FINDINGS: There is no acute intracranial hemorrhage, mass, or mass effect. No abnormal extra-axial fluid collections identified. There is no hydrocephalus. The basilar cisterns are widely patent. Extensive bilateral patchy white matter hypoattenuation again noted. It is unchanged. No findings present to indicate large, cortical-based territorial infarction. There is a small old right basal ganglia lacunar infarct. No skull fracture or aggressive osseous lesion noted. Thick mucoperiosteal thickening is noted in the right maxillary sinus. The mastoid air cells are clear. 1. No acute intracranial abnormality. 2. Extensive bilateral white matter hypoattenuation most in keeping with chronic microangiopathic disease. 3. Chronic appearing mucoperiosteal thickening in the right maxillary sinus. XR CHEST PORTABLE    Result Date: 6/3/2022  Portable chest x-ray CLINICAL INDICATION: Altered mental status. FINDINGS: Single AP view the chest compared to a similar exam dated 8/17/2006 shows mild subsegmental atelectasis in the right midlung. Otherwise the lung parenchyma is clear. There is no pleural effusion or pneumothorax. The cardiac silhouette and mediastinum are unremarkable. Mild subsegmental atelectasis in the right midlung. Otherwise no acute abnormality. Labs: Results:       BMP, Mg, Phos Recent Labs     06/12/22  0749 06/14/22  0520    138   K 3.0* 5.1   * 109*   CO2 21 22   BUN 2* 3*   MG 2.0  --       CBC Recent Labs     06/12/22  0749   WBC 6.1   RBC 4.19   HGB 11.4*   HCT 36.4         LFT No results for input(s): ALT, TP, ALB, GLOB in the last 72 hours.     Invalid input(s): SGOT, TBIL, AP, AGRAT, GPT   Cardiac Testing No results found for: BNP, CPK, RCK1, CKMB   Coagulation Tests No results found for: INR, APTT   A1c No results found for: HBA1C   Lipid Panel No results found for: CHOL, CHOLPOCT, CHOLX, CHLST, CHOLV, 088123, HDL, HDLC, LDL, LDLC, 462727, VLDLC, VLDL, TGLX, TRIGL   Thyroid Panel No results found for: TSH, T4, FT4     Most Recent UA Lab Results   Component Value Date    MUCUS 0 06/03/2022          All Labs from Last 24 Hrs:  Recent Results (from the past 24 hour(s))   POCT Glucose    Collection Time: 06/13/22  3:42 PM   Result Value Ref Range    POC Glucose 91 65 - 100 mg/dL    Performed by: Maria Fernanda    POCT Glucose    Collection Time: 06/13/22  8:07 PM   Result Value Ref Range    POC Glucose 98 65 - 100 mg/dL    Performed by: Teresa العراقي    Basic Metabolic Panel    Collection Time: 06/14/22  5:20 AM   Result Value Ref Range    Sodium 138 136 - 145 mmol/L    Potassium 5.1 3.5 - 5.1 mmol/L    Chloride 109 (H) 98 - 107 mmol/L    CO2 22 21 - 32 mmol/L    Anion Gap 7 7 - 16 mmol/L    Glucose 100 65 - 100 mg/dL    BUN 3 (L) 8 - 23 MG/DL    CREATININE 0.40 (L) 0.6 - 1.0 MG/DL    GFR African American >60 >60 ml/min/1.73m2    GFR Non- >60 >60 ml/min/1.73m2    Calcium 9.4 8.3 - 10.4 MG/DL   POCT Glucose    Collection Time: 06/14/22  7:48 AM   Result Value Ref Range    POC Glucose 79 65 - 100 mg/dL    Performed by: [facility]    POCT Glucose    Collection Time: 06/14/22 11:26 AM   Result Value Ref Range    POC Glucose 107 (H) 65 - 100 mg/dL    Performed by: [facility]        Allergies   Allergen Reactions    Acetaminophen Other (See Comments)    Carisoprodol Other (See Comments)    Cephalexin Nausea Only    Cyclobenzaprine Other (See Comments)    Erythromycin Nausea Only    Gabapentin Other (See Comments)    Hydrocodone-Acetaminophen Other (See Comments)    Metoclopramide Other (See Comments)    Penicillins Swelling    Povidone-Iodine Other (See Comments)    Risperidone Other (See Comments)    Sulfa Antibiotics Nausea Only    Codeine Rash    Oxycodone-Acetaminophen Rash     Immunization History   Administered Date(s) Administered    COVID-19, Moderna, Primary or Immunocompromised, PF, 100mcg/0.5mL 01/04/2021, 02/01/2021, 12/28/2021    Pneumococcal Vaccine 06/21/2010       Recent Vital Data:  Patient Vitals for the past 24 hrs:   Temp Pulse Resp BP SpO2   06/14/22 1126 98.2 °F (36.8 °C) (!) 101 18 126/80 98 %   06/14/22 0747 98.1 °F (36.7 °C) (!) 107 18 (!) 144/98 100 %   06/14/22 0304 98.2 °F (36.8 °C) (!) 104 17 (!) 150/84 100 %   06/13/22 2245 97.7 °F (36.5 °C) (!) 108 16 (!) 116/50 97 % 06/13/22 1902 97.9 °F (36.6 °C) 100 16 121/71 98 %   06/13/22 1545 97.5 °F (36.4 °C) 94 16 (!) 115/90 99 %       Oxygen Therapy  SpO2: 98 %  Pulse Oximeter Device Mode: Intermittent  O2 Device: None (Room air)  O2 Flow Rate (L/min): 2 L/min  O2 Delivery Method: Nasal cannula    Estimated body mass index is 27.81 kg/m² as calculated from the following:    Height as of this encounter: 5' 4\" (1.626 m). Weight as of 6/11/22: 162 lb (73.5 kg). Intake/Output Summary (Last 24 hours) at 6/14/2022 1315  Last data filed at 6/14/2022 1230  Gross per 24 hour   Intake 90 ml   Output 1800 ml   Net -1710 ml         Physical Exam:    /80   Pulse (!) 101   Temp 98.2 °F (36.8 °C) (Axillary)   Resp 18   Ht 5' 4\" (1.626 m)   Wt 162 lb 4.1 oz (73.6 kg)   SpO2 98%   BMI 27.81 kg/m²      General:          Well nourished. Patient is lying flat quietly in bed. She opens eyes when she is called loudly. She does not answer questions. 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 Head:               Normocephalic, atraumatic, not pale. No icterus. Eyes:               Sclerae appear normal.  Pupils equally round. ENT:                Nares appear normal, no drainage. Moist oral mucosa  Neck:               No restricted ROM. Trachea midline   CV:                  RRR. No m/r/g. No jugular venous distension. Lungs:             CTAB. No wheezing, rhonchi, or rales. Respirations even, unlabored  Abdomen: Bowel sounds present. Soft, nontender, nondistended. Extremities:     No cyanosis or clubbing. No edema  Skin:                No rashes and normal coloration. Warm and dry. Neuro:             As mentioned above, patient responded minimally to verbal stimuli. Does not seem to have localized weakness  No spasticity of her extremities. Signed:  Mariusz Roman MD    Part of this note may have been written by using a voice dictation software.   The note has been proof read but may still contain some grammatical/other typographical errors.

## 2022-06-14 NOTE — PROGRESS NOTES
Report called to Nurse at [facility]. Opportunity for questions given. IV and vincent removed.  time 1615.

## 2022-07-03 PROBLEM — N39.0 UTI (URINARY TRACT INFECTION): Status: RESOLVED | Noted: 2022-06-03 | Resolved: 2022-07-03
